# Patient Record
Sex: FEMALE | Race: OTHER | HISPANIC OR LATINO | Employment: FULL TIME | URBAN - METROPOLITAN AREA
[De-identification: names, ages, dates, MRNs, and addresses within clinical notes are randomized per-mention and may not be internally consistent; named-entity substitution may affect disease eponyms.]

---

## 2017-08-29 ENCOUNTER — ALLSCRIPTS OFFICE VISIT (OUTPATIENT)
Dept: OTHER | Facility: OTHER | Age: 52
End: 2017-08-29

## 2017-08-29 DIAGNOSIS — Z11.3 ENCOUNTER FOR SCREENING FOR INFECTIONS WITH PREDOMINANTLY SEXUAL MODE OF TRANSMISSION: ICD-10-CM

## 2017-08-29 DIAGNOSIS — Z83.49 FAMILY HISTORY OF OTHER ENDOCRINE, NUTRITIONAL AND METABOLIC DISEASES: ICD-10-CM

## 2017-08-29 DIAGNOSIS — Z00.00 ENCOUNTER FOR GENERAL ADULT MEDICAL EXAMINATION WITHOUT ABNORMAL FINDINGS: ICD-10-CM

## 2017-08-29 DIAGNOSIS — Z12.31 ENCOUNTER FOR SCREENING MAMMOGRAM FOR MALIGNANT NEOPLASM OF BREAST: ICD-10-CM

## 2017-08-29 DIAGNOSIS — Z13.6 ENCOUNTER FOR SCREENING FOR CARDIOVASCULAR DISORDERS: ICD-10-CM

## 2017-08-31 ENCOUNTER — TRANSCRIBE ORDERS (OUTPATIENT)
Dept: ADMINISTRATIVE | Facility: HOSPITAL | Age: 52
End: 2017-08-31

## 2017-08-31 ENCOUNTER — HOSPITAL ENCOUNTER (OUTPATIENT)
Dept: MAMMOGRAPHY | Facility: HOSPITAL | Age: 52
Discharge: HOME | End: 2017-08-31
Attending: OBSTETRICS & GYNECOLOGY
Payer: COMMERCIAL

## 2017-08-31 ENCOUNTER — APPOINTMENT (OUTPATIENT)
Dept: LAB | Facility: HOSPITAL | Age: 52
End: 2017-08-31
Attending: OBSTETRICS & GYNECOLOGY
Payer: COMMERCIAL

## 2017-08-31 DIAGNOSIS — Z12.31 VISIT FOR SCREENING MAMMOGRAM: Primary | ICD-10-CM

## 2017-08-31 DIAGNOSIS — N92.0 EXCESSIVE OR FREQUENT MENSTRUATION: Primary | ICD-10-CM

## 2017-08-31 DIAGNOSIS — N92.0 EXCESSIVE OR FREQUENT MENSTRUATION: ICD-10-CM

## 2017-08-31 LAB
ERYTHROCYTE [DISTWIDTH] IN BLOOD BY AUTOMATED COUNT: 19.4 % (ref 11.6–15.1)
FERRITIN SERPL-MCNC: 2 NG/ML (ref 8–388)
HCT VFR BLD AUTO: 31.7 % (ref 34.8–46.1)
HGB BLD-MCNC: 8.8 G/DL (ref 11.5–15.4)
MCH RBC QN AUTO: 18.5 PG (ref 26.8–34.3)
MCHC RBC AUTO-ENTMCNC: 27.8 G/DL (ref 31.4–37.4)
MCV RBC AUTO: 67 FL (ref 82–98)
PLATELET # BLD AUTO: 589 THOUSANDS/UL (ref 149–390)
PMV BLD AUTO: 10.4 FL (ref 8.9–12.7)
RBC # BLD AUTO: 4.75 MILLION/UL (ref 3.81–5.12)
T4 SERPL-MCNC: 8.3 UG/DL (ref 4.7–13.3)
TSH SERPL DL<=0.05 MIU/L-ACNC: 1.64 UIU/ML (ref 0.36–3.74)
WBC # BLD AUTO: 11.06 THOUSAND/UL (ref 4.31–10.16)

## 2017-08-31 PROCEDURE — 84439 ASSAY OF FREE THYROXINE: CPT

## 2017-08-31 PROCEDURE — 84436 ASSAY OF TOTAL THYROXINE: CPT

## 2017-08-31 PROCEDURE — 36415 COLL VENOUS BLD VENIPUNCTURE: CPT

## 2017-08-31 PROCEDURE — 84443 ASSAY THYROID STIM HORMONE: CPT

## 2017-08-31 PROCEDURE — 85027 COMPLETE CBC AUTOMATED: CPT

## 2017-08-31 PROCEDURE — 82728 ASSAY OF FERRITIN: CPT

## 2017-09-06 ENCOUNTER — ALLSCRIPTS OFFICE VISIT (OUTPATIENT)
Dept: OTHER | Facility: OTHER | Age: 52
End: 2017-09-06

## 2017-09-06 ENCOUNTER — TRANSCRIBE ORDERS (OUTPATIENT)
Dept: LAB | Facility: CLINIC | Age: 52
End: 2017-09-06

## 2017-09-06 ENCOUNTER — GENERIC CONVERSION - ENCOUNTER (OUTPATIENT)
Dept: OTHER | Facility: OTHER | Age: 52
End: 2017-09-06

## 2017-09-06 ENCOUNTER — APPOINTMENT (OUTPATIENT)
Dept: LAB | Facility: CLINIC | Age: 52
End: 2017-09-06
Payer: COMMERCIAL

## 2017-09-06 DIAGNOSIS — Z00.00 ENCOUNTER FOR GENERAL ADULT MEDICAL EXAMINATION WITHOUT ABNORMAL FINDINGS: ICD-10-CM

## 2017-09-06 DIAGNOSIS — Z13.6 ENCOUNTER FOR SCREENING FOR CARDIOVASCULAR DISORDERS: ICD-10-CM

## 2017-09-06 DIAGNOSIS — Z11.3 ENCOUNTER FOR SCREENING FOR INFECTIONS WITH PREDOMINANTLY SEXUAL MODE OF TRANSMISSION: ICD-10-CM

## 2017-09-06 DIAGNOSIS — Z83.49 FAMILY HISTORY OF OTHER ENDOCRINE, NUTRITIONAL AND METABOLIC DISEASES: ICD-10-CM

## 2017-09-06 LAB
ANION GAP SERPL CALCULATED.3IONS-SCNC: 6 MMOL/L (ref 4–13)
BUN SERPL-MCNC: 8 MG/DL (ref 5–25)
CALCIUM SERPL-MCNC: 9.8 MG/DL (ref 8.3–10.1)
CHLAMYDIA DNA CVX QL NAA+PROBE: NORMAL
CHLORIDE SERPL-SCNC: 106 MMOL/L (ref 100–108)
CHOLEST SERPL-MCNC: 184 MG/DL (ref 50–200)
CO2 SERPL-SCNC: 26 MMOL/L (ref 21–32)
CREAT SERPL-MCNC: 0.75 MG/DL (ref 0.6–1.3)
FERRITIN SERPL-MCNC: 2 NG/ML (ref 8–388)
GFR SERPL CREATININE-BSD FRML MDRD: 93 ML/MIN/1.73SQ M
GLUCOSE P FAST SERPL-MCNC: 84 MG/DL (ref 65–99)
HDLC SERPL-MCNC: 53 MG/DL (ref 40–60)
IRON SATN MFR SERPL: 3 %
IRON SERPL-MCNC: 15 UG/DL (ref 50–170)
LDLC SERPL CALC-MCNC: 98 MG/DL (ref 0–100)
N GONORRHOEA DNA GENITAL QL NAA+PROBE: NORMAL
POTASSIUM SERPL-SCNC: 3.9 MMOL/L (ref 3.5–5.3)
SODIUM SERPL-SCNC: 138 MMOL/L (ref 136–145)
TIBC SERPL-MCNC: 468 UG/DL (ref 250–450)
TRIGL SERPL-MCNC: 167 MG/DL

## 2017-09-06 PROCEDURE — 80048 BASIC METABOLIC PNL TOTAL CA: CPT

## 2017-09-06 PROCEDURE — 87591 N.GONORRHOEAE DNA AMP PROB: CPT

## 2017-09-06 PROCEDURE — 36415 COLL VENOUS BLD VENIPUNCTURE: CPT

## 2017-09-06 PROCEDURE — 83540 ASSAY OF IRON: CPT

## 2017-09-06 PROCEDURE — 87389 HIV-1 AG W/HIV-1&-2 AB AG IA: CPT

## 2017-09-06 PROCEDURE — 86592 SYPHILIS TEST NON-TREP QUAL: CPT

## 2017-09-06 PROCEDURE — 82728 ASSAY OF FERRITIN: CPT

## 2017-09-06 PROCEDURE — 87491 CHLMYD TRACH DNA AMP PROBE: CPT

## 2017-09-06 PROCEDURE — 80061 LIPID PANEL: CPT

## 2017-09-06 PROCEDURE — 83550 IRON BINDING TEST: CPT

## 2017-09-07 LAB — RPR SER QL: NORMAL

## 2017-09-08 LAB — HIV 1+2 AB+HIV1 P24 AG SERPL QL IA: NORMAL

## 2017-09-15 LAB — MISCELLANEOUS LAB TEST RESULT: NORMAL

## 2017-09-21 ENCOUNTER — GENERIC CONVERSION - ENCOUNTER (OUTPATIENT)
Dept: OTHER | Facility: OTHER | Age: 52
End: 2017-09-21

## 2017-09-26 ENCOUNTER — GENERIC CONVERSION - ENCOUNTER (OUTPATIENT)
Dept: OTHER | Facility: OTHER | Age: 52
End: 2017-09-26

## 2017-09-26 ENCOUNTER — HOSPITAL ENCOUNTER (OUTPATIENT)
Dept: MAMMOGRAPHY | Facility: MEDICAL CENTER | Age: 52
Discharge: HOME | End: 2017-09-26
Payer: COMMERCIAL

## 2017-09-26 DIAGNOSIS — Z12.31 VISIT FOR SCREENING MAMMOGRAM: ICD-10-CM

## 2017-09-26 PROCEDURE — 77063 BREAST TOMOSYNTHESIS BI: CPT

## 2017-09-26 PROCEDURE — G0202 SCR MAMMO BI INCL CAD: HCPCS

## 2017-10-27 ENCOUNTER — ALLSCRIPTS OFFICE VISIT (OUTPATIENT)
Dept: OTHER | Facility: OTHER | Age: 52
End: 2017-10-27

## 2017-10-27 ENCOUNTER — TRANSCRIBE ORDERS (OUTPATIENT)
Dept: ADMINISTRATIVE | Facility: HOSPITAL | Age: 52
End: 2017-10-27

## 2017-10-27 ENCOUNTER — APPOINTMENT (OUTPATIENT)
Dept: LAB | Facility: MEDICAL CENTER | Age: 52
End: 2017-10-27
Attending: INTERNAL MEDICINE
Payer: COMMERCIAL

## 2017-10-27 ENCOUNTER — GENERIC CONVERSION - ENCOUNTER (OUTPATIENT)
Dept: OTHER | Facility: OTHER | Age: 52
End: 2017-10-27

## 2017-10-27 DIAGNOSIS — D50.0 IRON DEFICIENCY ANEMIA DUE TO CHRONIC BLOOD LOSS: ICD-10-CM

## 2017-10-27 LAB
ERYTHROCYTE [DISTWIDTH] IN BLOOD BY AUTOMATED COUNT: 25.6 % (ref 11.6–15.1)
HCT VFR BLD AUTO: 40.8 % (ref 34.8–46.1)
HGB BLD-MCNC: 12.6 G/DL (ref 11.5–15.4)
MCH RBC QN AUTO: 24.5 PG (ref 26.8–34.3)
MCHC RBC AUTO-ENTMCNC: 30.9 G/DL (ref 31.4–37.4)
MCV RBC AUTO: 79 FL (ref 82–98)
PLATELET # BLD AUTO: 420 THOUSANDS/UL (ref 149–390)
PMV BLD AUTO: 10.8 FL (ref 8.9–12.7)
RBC # BLD AUTO: 5.15 MILLION/UL (ref 3.81–5.12)
WBC # BLD AUTO: 12.45 THOUSAND/UL (ref 4.31–10.16)

## 2017-10-27 PROCEDURE — 36415 COLL VENOUS BLD VENIPUNCTURE: CPT

## 2017-10-27 PROCEDURE — 83516 IMMUNOASSAY NONANTIBODY: CPT

## 2017-10-27 PROCEDURE — 82784 ASSAY IGA/IGD/IGG/IGM EACH: CPT

## 2017-10-27 PROCEDURE — 86255 FLUORESCENT ANTIBODY SCREEN: CPT

## 2017-10-27 PROCEDURE — 85027 COMPLETE CBC AUTOMATED: CPT

## 2017-10-28 NOTE — CONSULTS
Assessment  1  Iron deficiency anemia due to chronic blood loss (280 0) (D50 0)    Plan  Iron deficiency anemia due to chronic blood loss    · Suprep Bowel Prep Kit 17 5-3 13-1 6 GM/180ML Oral Solution; DILUTE CONTENTS  AND USE AS DIRECTED FOR BOWEL PREP   Rx By: Norman Huston; Dispense: 0 Days ; #:1 X 177 ML Bottle (2 Bottles); Refill: 0;For: Iron deficiency anemia due to chronic blood loss; PARKER = N; Verified Transmission to [facility]ACY #[id number]; Last Updated By: System, SureScrimekhi; 10/27/2017 9:57:05 AM   · (1) CBC/ PLT (NO DIFF); Status:Active; Requested QEM:59LJW7425;    Perform:[facility]; BEQ:[id number]; Ordered; For:Iron deficiency anemia due to chronic blood loss; Ordered By:Holger Victoria;   · (1) CELIAC DISEASE AB PROFILE; Status:Active; Requested QLM:17PCW4743;    Perform:[facility]; [id number]; Ordered; For:Iron deficiency anemia due to chronic blood loss; Ordered By:Holger Victoria;   · COLONOSCOPY (GI, SURG); Status:Hold For - Scheduling; Requested YAF:58BPK9469;    Perform:[facility]; Order Comments:Anish; GDI:45XSB5912; Ordered; For:Iron deficiency anemia due to chronic blood loss; Ordered By:Holger Victoria;   · EGD; Status:Hold For - Scheduling; Requested IWX:46EXA1625;    Perform:[facility]; KDJ:[id number];FRPKAPQ; For:Iron deficiency anemia due to chronic blood loss; Ordered By:Holger Victoria;   · Follow-up PRN Evaluation and Treatment  Follow-up  Status: Complete  Done:  49EBY1480   Ordered; For: Iron deficiency anemia due to chronic blood loss; Ordered By: Norman Huston Performed:  Due: 79HMR6901    Discussion/Summary  Discussion Summary:   She has iron deficiency anemia likely secondary to menorrhagia rule out GI blood loss  I will schedule her for EGD and colonoscopy to assess for peptic ulcer disease, AVMs, advanced adenoma and malignancy  Continue ferrous sulfate 2 tablets a day  Take Colace while taking iron   I will also check celiac panel to rule out celiac disease  reviewed risks benefits and alternates of EGD and colonoscopy  Risks include but not limited to infection, bleeding, perforation, missed lesion  Bowel prep instructions for colonoscopy given  Continue follow up with the family doctor and gynecologist    Counseling Documentation With Imm: The patient was counseled regarding prognosis,-- patient and family education,-- impressions  Goals and Barriers: The patient has the current Goals: Get EGD and colonoscopy  The patent has the current Barriers: Non  Patient's Capacity to Self-Care: Patient is able to Self-Care  Chief Complaint  Chief Complaint Free Text Note Form: consult for colon screening      History of Present Illness  HPI: 81-year-old female here for evaluation of iron deficiency anemia  She has chronic iron deficiency anemia for about 4 years  She denies abdominal pain, melena, hematemesis, or hematochezia  She has heavy menstrual bleeding  She had prior blood transfusion few years ago for low hemoglobin  Her most recent hemoglobin in August 2017 is 8 8 with MCV of 67  Her ferritin is 2, iron 15 and TIBC 468   she never had EGD or colonoscopy in the past   She was started on iron sulfate 2 tablets a day about a month ago  Review of Systems  Complete-Female GI Adult:   Constitutional: No fever, no chills, feels well, no tiredness, no recent weight gain or weight loss  Eyes: No complaints of eye pain, no red eyes, no eyesight problems, no discharge, no dry eyes, no itching of eyes  ENT: no complaints of earache, no loss of hearing, no nose bleeds, no nasal discharge, no sore throat, no hoarseness  Cardiovascular: No complaints of slow heart rate, no fast heart rate, no chest pain, no palpitations, no leg claudication, no lower extremity edema  Respiratory: No complaints of shortness of breath, no wheezing, no cough, no SOB on exertion, no orthopnea, no PND     Gastrointestinal: No complaints of abdominal pain, no constipation, no nausea or vomiting, no diarrhea, no bloody stools-- and-- as noted in HPI  Genitourinary: No complaints of dysuria, no incontinence, no pelvic pain, no dysmenorrhea, no vaginal discharge or bleeding  Musculoskeletal: No complaints of arthralgias, no myalgias, no joint swelling or stiffness, no limb pain or swelling  Integumentary: No complaints of skin rash or lesions, no itching, no skin wounds, no breast pain or lump  Neurological: No complaints of headache, no confusion, no convulsions, no numbness, no dizziness or fainting, no tingling, no limb weakness, no difficulty walking  Psychiatric: Not suicidal, no sleep disturbance, no anxiety or depression, no change in personality, no emotional problems  Endocrine: No complaints of proptosis, no hot flashes, no muscle weakness, no deepening of the voice, no feelings of weakness  Hematologic/Lymphatic: No complaints of swollen glands, no swollen glands in the neck, does not bleed easily, does not bruise easily  ROS Reviewed:   ROS reviewed  Active Problems  1  Blurry vision (368 8) (H53 8)   2  Colon cancer screening (V76 51) (Z12 11)   3  Encounter for gynecological examination without abnormal finding (V72 31) (Z01 419)   4  Flu vaccine need (V04 81) (Z23)   5  Inadequate exercise (V69 0) (Z72 3)   6  Intraductal hyperplasia without atypia of right breast (610 8) (N60 91)   7  Iron deficiency anemia due to chronic blood loss (280 0) (D50 0)   8  Menorrhagia with regular cycle (626 2) (N92 0)   9  Routine screening for STI (sexually transmitted infection) (V74 5) (Z11 3)   10  Screening for heart disease (V81 2) (Z13 6)   11  Visit for screening mammogram (V76 12) (Z12 31)    Past Medical History  1  Denied: History of abnormal cervical Pap smear   2  History of anemia (V12 3) (Z86 2)   3  Denied: History of herpes simplex infection   4  History of pregnancy (V13 29)   5  Denied: History of sexually transmitted disease   6   History of Varicella without complication (084 9) (E45 7)  Active Problems And Past Medical History Reviewed: The active problems and past medical history were reviewed and updated today  Surgical History  1  History of Biopsy Breast Percutaneous Needle Core   2  History of Tubal Ligation  Surgical History Reviewed: The surgical history was reviewed and updated today  Family History  Mother    1  Family history of anemia (V18 2) (Z83 2)   2  Family history of breast cancer (V16 3) (Z80 3)   3  Family history of thyroid disorder (V18 19) (Z83 49)  Father    4  Family history of hyperlipidemia (V18 19) (Z83 49)  Family History    5  Denied: Family history of depression   6  Denied: Family history of DVT   7  Denied: Family history of hypercholesterolemia   8  Denied: Family history of hypertension   9  Denied: Family history of ischemic heart disease   10  Family history of stroke (V17 1) (Z82 3)   11  Denied: Family history of type 2 diabetes mellitus  Family History Reviewed: The family history was reviewed and updated today  Social History   · Alcohol use (V49 89) (Z78 9)   · Denied: History of Drug use   · Inadequate exercise (V69 0) (Z72 3)   ·    · Never smoker   · Occupation   · [address] History Reviewed: The social history was reviewed and updated today  The social history was reviewed and is unchanged  Current Meds   1  Docusate Sodium 100 MG Oral Capsule; Take 1-2 capsules up to 2 times daily with   plenty of water for constipation; Therapy: 42IIU7532 to (Last Rx:76Qur1275)  Requested for: 99Yyw5841 Ordered   2  Ferrous Sulfate 325 (65 Fe) MG Oral Tablet; TAKE 1 TABLET TWICE DAILY WITH MEALS; Therapy: 57WFC3277 to (Renate Angela)  Requested for: 74LMS0061; Last   Rx:62Jze7609 Ordered   3  Ibuprofen 200 MG Oral Tablet; Therapy: (Ulus Lie) to Recorded   4  Tylenol Extra Strength TABS; Therapy: (94 31 11) to Recorded  Medication List Reviewed:    The medication list was reviewed and updated today  Allergies  1  No Known Drug Allergies    Vitals  Vital Signs    Recorded: 73BPA6270 09:35AM   Temperature 98 4 F, Oral   Heart Rate 80   Systolic 735, LUE, Sitting   Diastolic 60, LUE, Sitting   Height 5 ft 2 in   Weight 139 lb    BMI Calculated 25 42   BSA Calculated 1 64   O2 Saturation 98     Physical Exam    Constitutional   General appearance: No acute distress, well appearing and well nourished  Ears, Nose, Mouth, and Throat   Oropharynx: Normal with no erythema, edema, exudate or lesions  Pulmonary   Respiratory effort: No increased work of breathing or signs of respiratory distress  Auscultation of lungs: Clear to auscultation  Cardiovascular   Auscultation of heart: Normal rate and rhythm, normal S1 and S2, without murmurs  Abdomen   Abdomen: Non-tender, no masses  Liver and spleen: No hepatomegaly or splenomegaly  Lymphatic   Palpation of lymph nodes in neck: No lymphadenopathy  Musculoskeletal   Digits and nails: Normal without clubbing or cyanosis  Skin   Skin and subcutaneous tissue: Normal without rashes or lesions  Neurologic   Reflexes: 2+ and symmetric      Psychiatric   Orientation to person, place, and time: Normal          Future Appointments    Date/Time Provider Specialty Site   09/10/2018 11:00 AM Navneet John MD Family [facility]   Electronically signed by : Francisca Morales MD; Oct 27 2017 10:03AM EST                       (Author)

## 2017-10-29 ENCOUNTER — GENERIC CONVERSION - ENCOUNTER (OUTPATIENT)
Dept: OTHER | Facility: OTHER | Age: 52
End: 2017-10-29

## 2017-10-29 LAB
ENDOMYSIUM IGA SER QL: NEGATIVE
GLIADIN PEPTIDE IGA SER-ACNC: 9 UNITS (ref 0–19)
GLIADIN PEPTIDE IGG SER-ACNC: 3 UNITS (ref 0–19)
IGA SERPL-MCNC: 333 MG/DL (ref 87–352)
TTG IGA SER-ACNC: <2 U/ML (ref 0–3)
TTG IGG SER-ACNC: <2 U/ML (ref 0–5)

## 2018-01-10 NOTE — RESULT NOTES
Verified Results  *US BREAST RIGHT LIMITED (DIAGNOSTIC) [id number] 08:30AM Aquilla Burkitt Order Number: [id number]    - Patient Instructions: To schedule this appointment, please contact Central Scheduling at [phone]  Test Name Result Flag Reference   US BREAST RIGHT LIMITED (Report)     Patient History:   Family history of breast cancer in mother at age 48 or over  Benign US guided breast biopsy right of the right breast,    February 19, 2016  Mammo Pathology Letter, February 19, 2016  Patient has never smoked  Patient's BMI is 28 5  Reason for exam: additional evaluation requested from prior    study  Six-month follow-up of numerous hypoechoic lesions in the right    breast      US Breast Right Limited: August 8, 2016 - Check In #: [de-identified]   Technologist: Alexis Smallwood RDMS   Prior study comparison: February 8, 2016, US breast right limited   performed at [address] [place] [address]  Targeted ultrasound demonstrates no evidence of new or suspicious   hypoechoic mass or architectural distortion to suggest    malignancy  None of the previously described findings have    significantly changed in size or morphology  No suspicious hypoechoic mass or architectural    distortion to suggest malignancy  ASSESSMENT: BiRad:2 - Benign     Recommendation:   Routine screening mammogram of both breasts in 6 months       Transcription Location: [facility] 98: [id number]     Risk Value(s):   Tyrer-Cuzick 10 Year: 6 933%, Tyrer-Cuzick Lifetime: 27 408%,    Myriad Table: 1 5%, SLAVA 5 Year: 1 5%, NCI Lifetime: 13 4%   Signed by:   Bc Mendoza MD   8/8/16       Plan  Encounter for screening mammogram for malignant neoplasm of breast    · * MAMMO SCREENING BILATERAL W CAD; Status:Hold For - Scheduling; Requested  for:38Eel3241;

## 2018-01-10 NOTE — PROGRESS NOTES
Assessment   1  Iron deficiency anemia due to chronic blood loss (280 0) (D50 0)   · 2015: transfused 1 U PRBC, iron tabs  9/17: ferritin 2, H/H 8 8/13 7%, MCV 67   2  Encounter for preventive health examination (V70 0) (Z00 00)  3  Screening for heart disease (V81 2) (Z13 6)  4  Routine screening for STI (sexually transmitted infection) (V74 5) (Z11 3)  5  Blurry vision (368 8) (H53 8)    Plan  Blurry vision    · Camila Bennett  (Ophthalmology) Co-Management  *  Status: Hold For - Scheduling   Requested for: 40CBP7450  () Care Summary provided  : Yes  Colon cancer screening    · *1 -  [facility] Co-Management  *  Status: Active   Requested for: 45LJL7824  () Care Summary provided  : Yes   · COLONOSCOPY; Status:Active; Requested DQE:88BGE5674;   FamHx: Family history of thyroid disorder    · (1) BASIC METABOLIC PROFILE; Status:Active; Requested FRZ:40MRU9166;    · (1) IRON PANEL; Status:Active; Requested HOR:25BWD4729; Health Maintenance, Screening for heart disease    · (1) LIPID PANEL, FASTING; Status:Active; Requested NVC:25LUK9416;   Iron deficiency anemia due to chronic blood loss    · Start: Docusate Sodium 100 MG Oral Capsule; Take 1-2 capsules up to 2 times daily with  plenty of water for constipation   · Start: Ferrous Sulfate 325 (65 Fe) MG Oral Tablet; TAKE 1 TABLET TWICE DAILY WITH  MEALS  Routine screening for STI (sexually transmitted infection)    · (1) CHLAMYDIA/GC AMPLIFIED DNA, PCR; Source:Urine, Unspecified Source;  Status:Active; Requested OQP:15XZB9267;    · (1) HIV AG/AB COMBO, 4TH GEN; [Do Not Release]; Status:Active; Requested  LRC:65TSA9644;    · (1) RPR; Status:Active; Requested OSC:38HJW0981;     Discussion/Summary  health maintenance visit healthy adult female Currently, she eats a healthy diet and has an inadequate exercise regimen   the risks and benefits of cervical cancer screening were discussed cervical cancer screening is current Testing was done today for chlamydia, gonorrhea, HIV and and RPR  Breast cancer screening: the risks and benefits of breast cancer screening were discussed and mammogram is current  Colorectal cancer screening: the risks and benefits of colorectal cancer screening were discussed and colorectal cancer screening is current  Osteoporosis screening: the risks and benefits of osteoporosis screening were discussed and bone mineral density testing is not indicated  Screening lab work includes glucose and lipid profile  The risks and benefits of immunizations were discussed and immunizations will be given as outlined in the orders  She was advised to be evaluated by an ophthalmologist and a dentist  Advice and education were given regarding nutrition, aerobic exercise, weight bearing exercise, weight loss, reproductive health, cardiovascular risk reduction and seat belt use  Patient discussion: discussed with the patient  Hepatitis C Screening: the patient was counseled on Hepatitis C screening  (patient born in 1965)   The patient declines Hepatitis C screening       Well-appearing and healthy 50-year-old female here today to establish care + health maintenance as noted, I have ordered colonoscopy and flu shot today  She has a family history of hyperlipidemia and has no recent record of lipid panel being checked, we will check this along with BMP to screen for kidney function and diabetes  She has recently had a CBC that shows significant microcytosis without anemia with a hemoglobin of 8 8, although another provider is managing this I think it is reasonable to check iron panel at this time, and conservatively restart the patient on iron therapy twice a day with Colace in the event that she develops constipation  I've encouraged her to continue to follow with gynecology  I recommended that she see a dentist and an eye doctor annually  I recommended that she start an exercise program, including both cardiovascular and weight bearing exercises  She should follow-up at least annually  Chief Complaint  new pt physical      History of Present Illness  HM, Adult Female: The patient is being seen for a health maintenance evaluation  Social History: Household members include spouse  She is   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  The patient has never smoked cigarettes  She reports never drinking alcohol  She has never used illicit drugs  General Health: The patient's health since the last visit is described as good  She does not have regular dental visits  (plans to schedule a visit)   She denies vision problems  (sometimes difficulty with near vision, plans to make an appointment with eye doctor)   She denies hearing loss  Immunizations status: not up to date   due for flu shot today  Lifestyle:  She consumes a diverse and healthy diet  She has weight concerns  (worried she is too fat for her height)   She does not exercise regularly  (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)   She does not use tobacco  She denies alcohol use  She denies drug use  Reproductive health: the patient is premenopausal   she reports abnormal menses  (very heavy bleeding)   she uses no contraception  (had a tubal)   she is sexually active  pregnancy history: Theron Dover ()  Screening: Cervical cancer screening includes a pap smear performed last year  Breast cancer screening includes a mammogram performed last year  Colorectal cancer screening includes no previous colonoscopy  Metabolic screening includes no previous lipid profile, no previous glucose screening and thyroid function test performed last year  Cardiovascular risk factors: no hypertension, no diabetes, no stress, no obesity, no tobacco use, no illicit drug use and no sedentary lifestyle  Safety elements used: seat belt, safe driving habits, sunscreen, smoke detector, carbon monoxide detector and CPR training for the patient     HPI: this is an otherwise healthy 60-year-old female who presents today to establish care with a new provider  She has a history of anemia due to blood loss  She has been told in the past that she has uterine fibroids which causes heavy bleeding  In the past she did require one time a transfusion, when she was living in [place], and she has also had to take iron in the past although she takes no medications now  She finds herself to be in good health, she works as a home health aide  She remarried 2 years ago, she has 4 children, the youngest is 25, and she has 8 grandchildren  She is also here today to make sure that she is up-to-date on all of her appropriate screening tests, she wants to take care of and maintain her house  Her review of systems is grossly negative with the exception of trouble with near vision  She is not recently been to see an eye doctor or dentist      Review of Systems    Constitutional: no fever and no chills  Eyes: eyesight problems, but as noted in HPI    ENT: no hearing loss  Cardiovascular: no chest pain, no palpitations and no lower extremity edema  Respiratory: no shortness of breath and no wheezing    The patient presents with complaints of cough (last night, suspects it is from allergies)  Gastrointestinal: no abdominal pain, no nausea, no vomiting, no constipation, no diarrhea and no blood in stools  Genitourinary: unexplained vaginal bleeding (very heavy menstrual cycles), but as noted in HPI  Musculoskeletal: no arthralgias and no myalgias  Integumentary: no rashes and no skin lesions  Neurological: no headache, no numbness, no confusion, no dizziness, no convulsions, no fainting and no difficulty walking  Psychiatric: no anxiety and no depression  Endocrine: hot flashes (patient thinks she is close to menopause)  Hematologic/Lymphatic: no swollen glands, no tendency for easy bleeding and no tendency for easy bruising  Active Problems   1   Colon cancer screening (V76 51) (Z12 11)  2  Encounter for gynecological examination without abnormal finding (V72 31) (Z01 419)  3  Inadequate exercise (V69 0) (Z72 3)  4  Intraductal hyperplasia without atypia of right breast (610 8) (N60 91)  5  Iron deficiency anemia due to chronic blood loss (280 0) (D50 0)  6  Menorrhagia with regular cycle (626 2) (N92 0)  7  Visit for screening mammogram (V76 12) (Z12 31)    Past Medical History    · Denied: History of abnormal cervical Pap smear   · Denied: History of herpes simplex infection   · History of pregnancy (V13 29)   · Denied: History of sexually transmitted disease   · History of Varicella without complication (696 4) (S08 4)    Surgical History    · History of Biopsy Breast Percutaneous Needle Core   · History of Tubal Ligation    Family History  Mother    · Family history of breast cancer (V16 3) (Z80 3)   · Family history of thyroid disorder (V18 19) (Z80 46)  Father    · Family history of hyperlipidemia (V18 19) (Z83 49)  Family History    · Denied: Family history of depression   · Denied: Family history of DVT   · Denied: Family history of hypercholesterolemia   · Denied: Family history of hypertension   · Denied: Family history of ischemic heart disease   · Family history of stroke (V17 1) (Z82 3)   · Denied: Family history of type 2 diabetes mellitus    Social History    · Alcohol use (V49 89) (Z78 9)   · rare   · Denied: History of Drug use   · Inadequate exercise (V69 0) (Z72 3)   ·    · Never smoker   · 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   · Worship    Current Meds  1  Ibuprofen 200 MG Oral Tablet; Therapy: (Recorded:05Xeu7234) to Recorded    Allergies   1   No Known Drug Allergies    Vitals   Recorded: 79CRB1516 10:52AM   Heart Rate 687   Systolic 200   Diastolic 70   Height 5 ft 2 in   Weight 140 lb 0 4 oz   BMI Calculated 25 61   BSA Calculated 1 64     Physical Exam    Constitutional   General appearance: No acute distress, well appearing and well nourished  Head and Face   Head and face: Normal     Palpation of the face and sinuses: No sinus tenderness  Eyes   Conjunctiva and lids: No swelling, erythema or discharge  anicteric, no conjunctival injection, however, the conjunctiva are pale  Pupils and irises: Equal, round, reactive to light  extraocular muscles intact, accommodation intact  Ears, Nose, Mouth, and Throat   External inspection of ears and nose: Normal     Otoscopic examination: Tympanic membranes translucent with normal light reflex  Canals patent without erythema  Hearing: Normal     Nasal mucosa, septum, and turbinates: Normal without edema or erythema  Lips, teeth, and gums: Normal, good dentition  Oropharynx: Normal with no erythema, edema, exudate or lesions  moist mucous membranes  Neck   Neck: Supple, symmetric, trachea midline, no masses  Thyroid: Normal, no thyromegaly  nontender, no enlargement  Pulmonary   Respiratory effort: No increased work of breathing or signs of respiratory distress  Auscultation of lungs: Clear to auscultation  no wheezes, rales, rhonchi  Cardiovascular   Auscultation of heart: Normal rate and rhythm, normal S1 and S2, no murmurs  Carotid pulses: 2+ bilaterally  no bruit  Examination of extremities for edema and/or varicosities: Normal     Abdomen   Abdomen: Non-tender, no masses  soft, nontender, nondistended, positive bowel sounds, no CVA tenderness, no suprapubic tenderness  Lymphatic   Palpation of lymph nodes in neck: No lymphadenopathy  Musculoskeletal   Gait and station: Normal     Range of motion: Normal     Stability: Normal     Muscle strength/tone: Normal     Skin   Skin and subcutaneous tissue: Normal without rashes or lesions  warm and dry  Neurologic   Cranial nerves: Cranial nerves II-XII intact  Cortical function: Normal mental status  Sensation: No sensory loss      Psychiatric   Judgment and insight: Normal     Orientation to person, place, and time: Normal     Recent and remote memory: Intact      Mood and affect: Normal        Signatures   Electronically signed by : Denisse Stephens MD; Sep  6 2017 12:00PM EST                       (Author)

## 2018-01-11 NOTE — MISCELLANEOUS
Message  9/5/17 1320: LMOVM to call back  USG with several myomata, some possibly affecting the endometrium and labwork show iron deficiency anemia  rec SIS to further eval uterine cavity, iron supplements  Consider hysteroscopic resection of myomata if cavity affected or option for ablation or Mirena, or Lysteda if not  BEO  9/19/17: pt has scheduled f/u appt to discuss options   BEO      Signatures   Electronically signed by : BIBIANA Castle ; Sep 20 2017 12:43PM EST                       (Author)

## 2018-01-12 NOTE — RESULT NOTES
Verified Results  MAMMO SCREENING BILATERAL W 3D & CAD [id number] 03:12PM Huma Barrow     Test Name Result Flag Reference   MAMMO SCREENING BILATERAL W 3D & CAD (Report)     Patient History:   Family history of breast cancer at age 48 or over in mother  Benign US guided breast biopsy right, February 19, 2016  Mammo    Pathology Letter, February 19, 2016  Patient has never smoked  Patient's BMI is 28 5  Reason for exam: screening, asymptomatic  Mammo Screening Bilateral W DBT and CAD: September 26, 2017 -    Check In #: [de-identified]   2D/3D Procedure   3D views: Bilateral MLO view(s) were taken  2D views: Bilateral CC view(s) were taken  Technologist: MATTEO Molina (MATTEO)(M)   Prior study comparison: February 8, 2016, mammo diagnostic    bilateral W CAD, performed at [address] [place] [address]  February 1, 2016, mammo screening bilateral W CAD, performed at    [address] [place] [address]  The breast tissue is extremely dense, potentially limiting the    sensitivity of mammography  Patient risk, included in this    report, assists in determining the appropriate screening regimen    (such as 3-D mammography or the inclusion of automated breast    ultrasound or MRI)  3-D mammography may also remain indicated as    screening  A combination of mediolateral oblique 3D tomographic    slices as well as standard two-dimensional orthogonal images were   obtained  No dominant soft tissue mass, architectural distortion or    suspicious calcifications are noted in either breast  The skin    and nipple structures are within normal limits  Scattered benign   appearing calcifications are noted  No significant changes when compared with prior studies  ACR BI-RADSï¾® Assessments: BiRad:2 - Benign     Recommendation:   Routine screening mammogram of both breasts in 1 year  A    reminder letter will be scheduled       The patient is scheduled in a reminder system for screening mammography  8-10% of cancers will be missed on mammography  Management of a    palpable abnormality must be based on clinical grounds  Patients    will be notified of their results via letter from our facility  Accredited by [address] [place] [address] of Radiology and FDA       Transcription Location: AMTTEO Iglesias 98: [id number]     Risk Value(s):   Jeromyer-Cutobick 10 Year: 6 900%, Tyrer-Cuzick Lifetime: 26 100%,    Myriad Table: 1 5%, SLAVA 5 Year: 2 0%, NCI Lifetime: 16 5%

## 2018-01-13 VITALS
BODY MASS INDEX: 25.58 KG/M2 | HEIGHT: 62 IN | WEIGHT: 139 LBS | SYSTOLIC BLOOD PRESSURE: 114 MMHG | DIASTOLIC BLOOD PRESSURE: 76 MMHG

## 2018-01-14 VITALS
BODY MASS INDEX: 25.77 KG/M2 | WEIGHT: 140.03 LBS | HEART RATE: 110 BPM | SYSTOLIC BLOOD PRESSURE: 100 MMHG | HEIGHT: 62 IN | DIASTOLIC BLOOD PRESSURE: 70 MMHG

## 2018-01-14 VITALS
OXYGEN SATURATION: 98 % | BODY MASS INDEX: 25.58 KG/M2 | TEMPERATURE: 98.4 F | DIASTOLIC BLOOD PRESSURE: 60 MMHG | HEART RATE: 80 BPM | WEIGHT: 139 LBS | SYSTOLIC BLOOD PRESSURE: 120 MMHG | HEIGHT: 62 IN

## 2018-01-15 NOTE — RESULT NOTES
Verified Results  US BREAST LEFT LIMITED [id number] 08:19AM Mario Clark Order Number: [id number]   Performing Comments: Bilateral areas of asymmetry   - Patient Instructions: To schedule this appointment, please contact Central Scheduling at [phone]  Test Name Result Flag Reference   US BREAST LEFT LIMITED (Report)     Patient History:   Family history of breast cancer in mother at age 48 or over  Patient has never smoked  Patient's BMI is 28 5  Reason for exam: additional evaluation requested from abnormal    screening  Bilateral asymmetric densities identified on baseline screening    mammogram      Mammo Diagnostic Bilateral W CAD: February 8, 2016 - Check In #:    [de-identified]   Bilateral spot compression MLO, spot compression CC, and ML    view(s) were taken  Technologist: MARILEE Zazueta   Prior study comparison: February 1, 2016, mammo screening    bilateral W CAD, performed at [facility]  The breast tissue is heterogeneously dense, potentially limiting    the sensitivity of mammography  Patient risk, included in this    report, assists in determining the appropriate screening regimen    (such as 3-D mammography or the inclusion of automated breast    ultrasound or MRI)  3-D mammography may also remain indicated as    screening  Intermediate density nodule in the lower right breast   demonstrates no definite corresponding abnormality on    craniocaudal mammographic views  Asymmetric density in the lower and inner left breast dissipates    on diagnostic mammographic views most consistent with summation    shadow  Targeted right breast ultrasound reveals a hypoechoic sharply    circumscribed wider than tall solid appearing mass measuring 13    mm at the 7 o'clock position 2 cm from the nipple almost    certainly corresponding to the mammographic density identified in   the lower right breast on mammography   Additionally there are complex cysts measuring 5 mm at the 8 o'clock position 5 cm from    the nipple and 5 mm at the 8 o'clock position 4 cm from the    nipple for which 6 month ultrasound follow-up is recommended  Targeted left breast ultrasound reveals simple and minimally    complex cysts without evidence of suspicious solid mass  The    largest of these measures 10 mm at the 6 o'clock position in the    retroareolar position  No suspicious solid mass is identified to   account for the mammographic density on screening mammography    which is thought to represent summation shadow  Impression:     Hypoechoic nodule in the right breast probably represent    fibroadenoma however, ultrasound-guided core biopsy is    recommended for more definitive characterization of this solid    mass  Findings and recommendations were personally discussed    with the patient at the time of this examination  Additional almost certainly benign subcentimeter complex cysts    identified in the right breast for which 6 month ultrasound    follow-up is recommended  Summation shadow in the left breast      US Breast Right Limited: February 8, 2016 - Check In #: [de-identified]   Technologist: Roberto Burt     US Breast Left Limited: February 8, 2016 - Check In #: [de-identified]   Technologist: Roberto Burt     ASSESSMENT: BiRad:4 - Suspicious (Overall)     Recommendation:   Ultrasound-guided biopsy of the right breast  A breast darian    care nurse from our facility will be following/facilitating this    case  Ultrasound of the right breast in 6 months     Analyzed by CAD     Transcription Location: [address]   Signing Station: [id number]     Risk Value(s):   Tyrer-Cuzick 10 Year: 3 444%, Tyrer-Cuzick Lifetime: 14 466%,    Myriad Table: 1 5%, SLAVA 5 Year: 1 3%, NCI Lifetime: 11 5%       Plan  Breast asymmetry, Encounter for screening mammogram for malignant neoplasm of  breast    · Raffy Pathak; Status:Hold For - Scheduling; Requested for:[id number];

## 2018-01-15 NOTE — RESULT NOTES
Verified Results  (1) BASIC METABOLIC PROFILE [id number] 11:46AM [facility] Order Number: [id number]     Test Name Result Flag Reference   SODIUM 138 mmol/L  136-145   POTASSIUM 3 9 mmol/L  3 5-5 3   CHLORIDE 106 mmol/L  100-108   CARBON DIOXIDE 26 mmol/L  21-32   ANION GAP (CALC) 6 mmol/L  4-13   BLOOD UREA NITROGEN 8 mg/dL  5-25   CREATININE 0 75 mg/dL  0 60-1 30   Standardized to IDMS reference method   CALCIUM 9 8 mg/dL  8 3-10 1   eGFR 93 ml/min/1 73sq m     National Kidney Disease Education Program recommendations are as follows:  GFR calculation is accurate only with a steady state creatinine  Chronic Kidney disease less than 60 ml/min/1 73 sq  meters  Kidney failure less than 15 ml/min/1 73 sq  meters  GLUCOSE FASTING 84 mg/dL  65-99   Specimen collection should occur prior to Sulfasalazine administration due to the potential for falsely depressed results  Specimen collection should occur prior to Sulfapyridine administration due to the potential for falsely elevated results  (1) IRON PANEL [id number] 11:46AM [facility] Order Number: [id number]     Test Name Result Flag Reference   IRON 15 ug/dL L    Patients treated with metal-binding drugs (ie  Deferoxamine) may have depressed iron values  FERRITIN 2 ng/mL L 8-388   TOTAL IRON BINDING CAPACITY 468 ug/dL H 250-450   IRON SATURATION 3 %       (1) LIPID PANEL, FASTING [id number] 11:46AM [facility] Order Number: [id number]     Test Name Result Flag Reference   CHOLESTEROL 184 mg/dL     HDL,DIRECT 53 mg/dL  40-60   Specimen collection should occur prior to Metamizole administration due to the potential for falsley depressed results  LDL CHOLESTEROL CALCULATED 98 mg/dL  0-100   Triglyceride:        Normal ??? ??? ??? ??? ??? ??? ??? <150 mg/dl   ??? ??? ???Borderline High ??? ??? 150-199 mg/dl   ??? ??? ? ?? High ??? ??? ??? ??? ??? ??? ??? 200-499 mg/dl   ??? ??? ? ??Very High ??? ??? ??? ??? ??? >499 mg/dl      Cholesterol:       Desirable ??? ??? ??? ??? <200 mg/dl   ??? ??? Borderline High ??? 200-239 mg/dl   ??? ??? High ??? ??? ??? ??? ??? ??? >239 mg/dl      HDL Cholesterol:       High ??? ???>59 mg/dL   ??? ??? Low ??? ??? <41 mg/dL      This screening LDL is a calculated result  It does not have the accuracy of the Direct Measured LDL in the monitoring of patients with hyperlipidemia and/or statin therapy  Direct Measure LDL (HBG121) must be ordered separately in these patients  TRIGLYCERIDES 167 mg/dL H <=150   Specimen collection should occur prior to N-Acetylcysteine or Metamizole administration due to the potential for falsely depressed results

## 2018-01-15 NOTE — RESULT NOTES
Verified Results  US BREAST RIGHT LIMITED [id number] 08:19AM Lindsey Chao Order Number: [id number]   Performing Comments: bilateral areas of asymmetry   - Patient Instructions: To schedule this appointment, please contact Central Scheduling at [phone]  Test Name Result Flag Reference   US BREAST RIGHT LIMITED (Report)     Patient History:   Family history of breast cancer in mother at age 48 or over  Patient has never smoked  Patient's BMI is 28 5  Reason for exam: additional evaluation requested from abnormal    screening  Bilateral asymmetric densities identified on baseline screening    mammogram      Mammo Diagnostic Bilateral W CAD: February 8, 2016 - Check In #:    [de-identified]   Bilateral spot compression MLO, spot compression CC, and ML    view(s) were taken  Technologist: MARILEE Conley   Prior study comparison: February 1, 2016, mammo screening    bilateral W CAD, performed at [address] [facility]  The breast tissue is heterogeneously dense, potentially limiting    the sensitivity of mammography  Patient risk, included in this    report, assists in determining the appropriate screening regimen    (such as 3-D mammography or the inclusion of automated breast    ultrasound or MRI)  3-D mammography may also remain indicated as    screening  Intermediate density nodule in the lower right breast   demonstrates no definite corresponding abnormality on    craniocaudal mammographic views  Asymmetric density in the lower and inner left breast dissipates    on diagnostic mammographic views most consistent with summation    shadow  Targeted right breast ultrasound reveals a hypoechoic sharply    circumscribed wider than tall solid appearing mass measuring 13    mm at the 7 o'clock position 2 cm from the nipple almost    certainly corresponding to the mammographic density identified in   the lower right breast on mammography   Additionally there are    complex cysts measuring 5 mm at the 8 o'clock position 5 cm from    the nipple and 5 mm at the 8 o'clock position 4 cm from the    nipple for which 6 month ultrasound follow-up is recommended  Targeted left breast ultrasound reveals simple and minimally    complex cysts without evidence of suspicious solid mass  The    largest of these measures 10 mm at the 6 o'clock position in the    retroareolar position  No suspicious solid mass is identified to   account for the mammographic density on screening mammography    which is thought to represent summation shadow  Impression:     Hypoechoic nodule in the right breast probably represent    fibroadenoma however, ultrasound-guided core biopsy is    recommended for more definitive characterization of this solid    mass  Findings and recommendations were personally discussed    with the patient at the time of this examination  Additional almost certainly benign subcentimeter complex cysts    identified in the right breast for which 6 month ultrasound    follow-up is recommended  Summation shadow in the left breast      US Breast Right Limited: February 8, 2016 - Check In #: [de-identified]   Technologist: Leisa Goncalves     US Breast Left Limited: February 8, 2016 - Check In #: [de-identified]   Technologist: Leisa Goncalves     ASSESSMENT: BiRad:4 - Suspicious (Overall)     Recommendation:   Ultrasound-guided biopsy of the right breast  A breast darian    care nurse from our facility will be following/facilitating this    case  Ultrasound of the right breast in 6 months     Analyzed by CAD     Transcription Location: [address]   Signing Station: [id number]     Risk Value(s):   Tyrer-Cuzick 10 Year: 3 444%, Tyrer-Cuzick Lifetime: 14 466%,    Myriad Table: 1 5%, SLAVA 5 Year: 1 3%, NCI Lifetime: 11 5%       Plan  Breast asymmetry, Breast lump    · US BREAST RIGHT LIMITED; Status:Hold For - Scheduling; Requested for:[id number];

## 2018-01-15 NOTE — RESULT NOTES
Verified Results  (1) CBC/ PLT (NO DIFF) 27Oct2017 10:28AM Mal Aguilar    Order Number: [id number]     Test Name Result Flag Reference   HEMATOCRIT 40 8 %  34 8-46 1   HEMOGLOBIN 12 6 g/dL  11 5-15 4   MCHC 30 9 g/dL L 31 4-37 4   MCH 24 5 pg L 26 8-34 3   MCV 79 fL L 82-98   PLATELET COUNT 346 Thousands/uL H 149-390   RBC COUNT 5 15 Million/uL H 3 81-5 12   RDW 25 6 % H 11 6-15 1   WBC COUNT 12 45 Thousand/uL H 4 31-10 16   MPV 10 8 fL  8 9-12 7

## 2018-01-16 NOTE — RESULT NOTES
Verified Results  (1) SPECIAL TEST [id number] 09:06AM Coreen Domenic     Test Name Result Flag Reference   TEST RESULT see written report     T4, FREE DIRECT DIALYSIS

## 2018-01-18 NOTE — RESULT NOTES
Verified Results  (1) CELIAC DISEASE AB PROFILE 27Oct2017 10:28AM Vilma Brewer    Order Number: [id number]     Test Name Result Flag Reference   tTG IGG <2 U/mL  0 - 5   Negative        0 - 5                                Weak Positive   6 - 9                                Positive           >9   tTG IGA <2 U/mL  0 - 3   Negative        0 -  3                                Weak Positive   4 - 10                                Positive           >10   Tissue Transglutaminase (tTG) has been identified   as the endomysial antigen  Studies have demonstr-   ated that endomysial IgA antibodies have over 99%   specificity for gluten sensitive enteropathy     GLIADA 9 units  0 - 19   Negative                   0 - 19                     Weak Positive             20 - 30                     Moderate to Strong Positive   >30   GLIADG 3 units  0 - 19   Negative                   0 - 19                     Weak Positive             20 - 30                     Moderate to Strong Positive   >30   ENDOMYSIAL AB IGA Negative  Negative   Performed at:  [address] [place] [address]  [id number]  : Marty Diez MD, Phone:  [phone]    mg/dL  87 - 032

## 2018-01-22 VITALS
WEIGHT: 139.03 LBS | DIASTOLIC BLOOD PRESSURE: 70 MMHG | SYSTOLIC BLOOD PRESSURE: 100 MMHG | HEIGHT: 62 IN | BODY MASS INDEX: 25.58 KG/M2

## 2018-02-13 ENCOUNTER — OFFICE VISIT (OUTPATIENT)
Dept: OBGYN CLINIC | Facility: CLINIC | Age: 53
End: 2018-02-13
Payer: COMMERCIAL

## 2018-02-13 VITALS
BODY MASS INDEX: 27.56 KG/M2 | HEIGHT: 61 IN | DIASTOLIC BLOOD PRESSURE: 70 MMHG | WEIGHT: 146 LBS | SYSTOLIC BLOOD PRESSURE: 122 MMHG

## 2018-02-13 DIAGNOSIS — D25.1 INTRAMURAL LEIOMYOMA OF UTERUS: ICD-10-CM

## 2018-02-13 DIAGNOSIS — D50.0 IRON DEFICIENCY ANEMIA DUE TO CHRONIC BLOOD LOSS: Primary | ICD-10-CM

## 2018-02-13 DIAGNOSIS — N92.0 MENORRHAGIA WITH REGULAR CYCLE: ICD-10-CM

## 2018-02-13 PROBLEM — D25.9 UTERINE FIBROID: Status: ACTIVE | Noted: 2018-02-13

## 2018-02-13 PROCEDURE — 99214 OFFICE O/P EST MOD 30 MIN: CPT | Performed by: OBSTETRICS & GYNECOLOGY

## 2018-02-13 RX ORDER — MEDROXYPROGESTERONE ACETATE 5 MG/1
5 TABLET ORAL DAILY
Qty: 90 TABLET | Refills: 1 | Status: SHIPPED | OUTPATIENT
Start: 2018-02-13 | End: 2018-03-26

## 2018-02-13 RX ORDER — IBUPROFEN 200 MG
500 TABLET ORAL DAILY
COMMUNITY
End: 2018-08-06 | Stop reason: ALTCHOICE

## 2018-02-13 RX ORDER — FERROUS SULFATE TAB EC 324 MG (65 MG FE EQUIVALENT) 324 (65 FE) MG
324 TABLET DELAYED RESPONSE ORAL
Qty: 100 TABLET | Refills: 2 | Status: SHIPPED | OUTPATIENT
Start: 2018-02-13 | End: 2018-05-31

## 2018-02-13 NOTE — PROGRESS NOTES
Patient is a 47 y o  No obstetric history on file  with Patient's last menstrual period was 2018 (exact date)  who presents complaining of continued heavy menses  Pt seen last in September with options of Mirena, Lysteda,ablation discussed  Pt's finances limited  Rec embx given age and level of bleeding  Pt to consider  Pt aware of risk for CA  Past Medical History:   Diagnosis Date    Intraductal hyperplasia without atypia of right breast 2016    c florid and sclerosing adenosis, background dense stromal fibrosis    Iron deficiency anemia due to chronic blood loss     transfused : ferritin 2   H/H 8  7%    Menorrhagia with regular cycle     Varicella        Past Surgical History:   Procedure Laterality Date    BREAST BIOPSY Right 2016    intraductal hyperplasia    TUBAL LIGATION         OB History    Para Term  AB Living   4 4 4         SAB TAB Ectopic Multiple Live Births                  # Outcome Date GA Lbr Ant/2nd Weight Sex Delivery Anes PTL Lv   4 Term      Vag-Spont      3 Term      Vag-Spont      2 Term      Vag-Spont      1 Term      Vag-Spont             Gyn HX:  denies STD, abnormal pap, significant dysmenorrhea or irregular menses      Current Outpatient Prescriptions:     ferrous sulfate 324 (65 Fe) mg, Take 1 tablet (324 mg total) by mouth 2 (two) times a day before meals, Disp: 100 tablet, Rfl: 2    ibuprofen (MOTRIN) 200 mg tablet, Take 500 mg by mouth daily, Disp: , Rfl:     medroxyPROGESTERone (PROVERA) 5 mg tablet, Take 1 tablet (5 mg total) by mouth daily for 90 days, Disp: 90 tablet, Rfl: 1    norethindrone (AYGESTIN) 5 mg tablet, Take 1 tab orally each day fom -14th each month, Disp: 90 tablet, Rfl: 1    No Known Allergies    Social History     Social History    Marital status: /Civil Union     Spouse name: N/A    Number of children: N/A    Years of education: N/A     Social History Main Topics    Smoking status: Never Smoker    Smokeless tobacco: Never Used    Alcohol use No    Drug use: No    Sexual activity: Yes     Partners: Male     Birth control/ protection: Female Sterilization     Other Topics Concern    None     Social History Narrative    None       Family History   Problem Relation Age of Onset   Kyra Fitzpatrick Breast cancer Mother      52's passed 78 yo c mets    Anemia Mother     Thyroid disease Mother     Hyperlipidemia Father     Deep vein thrombosis Neg Hx     Mental illness Neg Hx     Hypertension Neg Hx     Coronary artery disease Neg Hx     Diabetes type II Neg Hx        Review of Systems   Constitutional: Negative for chills and fever  HENT: Negative for mouth sores, rhinorrhea and sore throat  Respiratory: Negative for shortness of breath and wheezing  Cardiovascular: Negative for chest pain and leg swelling  Gastrointestinal: Negative for abdominal distention, abdominal pain, diarrhea and nausea  Genitourinary: Negative for flank pain, hematuria and vaginal bleeding  Musculoskeletal: Negative for back pain  Neurological: Negative for dizziness, weakness and light-headedness  Hematological: Negative for adenopathy  Does not bruise/bleed easily  Psychiatric/Behavioral: Negative for confusion  Blood pressure 122/70, height 5' 0 71" (1 542 m), weight 66 2 kg (146 lb), last menstrual period 02/05/2018, not currently breastfeeding  and Body mass index is 27 85 kg/m²  Physical Exam   Constitutional: She is oriented to person, place, and time  She appears well-developed and well-nourished  HENT:   Head: Normocephalic and atraumatic  Neck: No tracheal deviation present  No thyromegaly present  Pulmonary/Chest: Effort normal  No respiratory distress  Abdominal: Soft  She exhibits no distension and no mass  There is no tenderness  There is no guarding  No hernia  Musculoskeletal: She exhibits no edema  Neurological: She is alert and oriented to person, place, and time     Skin: Skin is warm and dry  Psychiatric: She has a normal mood and affect  vulva: normal external genitalia for age and no lesions, masses, epithelial changes, or exudate  vagina: color pink, rugae  well formed rugae and discharge  bloody, mucoid and mod quantity with steady trickle through cervix  cervix: parous and no lesions   uterus: AF, 10 wks, mobile and irregular with 2-3 cm ant mid myoa anf 4 cm right fundal myoma and firm  adnexa: no masses or tenderness   Perineum wnl      A/P:  Pt is a 47 y o   c LMP 18 c menorrhagia similar to last visit in September    With unfortunately no coverage for problem care  Mirena is not covered nor are therapeutic pharmacy services  Bleeding appears similar to September's  Will trial daily progestin for suppression in lieu of Mirena, recheck heme labs as none since Oct   Encourage pt to contact business office re that aspect  Pt aware embx highly encouraged to r/o CA given her age  Diagnoses and all orders for this visit:    Iron deficiency anemia due to chronic blood loss  -     CBC; Future  -     Ferritin; Future  -     TSH, 3rd generation with T4 reflex; Future  -     ferrous sulfate 324 (65 Fe) mg; Take 1 tablet (324 mg total) by mouth 2 (two) times a day before meals    Menorrhagia with regular cycle  -     norethindrone (AYGESTIN) 5 mg tablet; Take 1 tab orally each day fom -14th each month  -     medroxyPROGESTERone (PROVERA) 5 mg tablet; Take 1 tablet (5 mg total) by mouth daily for 90 days  -     ferrous sulfate 324 (65 Fe) mg;  Take 1 tablet (324 mg total) by mouth 2 (two) times a day before meals    Intramural leiomyoma of uterus

## 2018-02-14 ENCOUNTER — LAB (OUTPATIENT)
Dept: LAB | Facility: CLINIC | Age: 53
End: 2018-02-14
Payer: COMMERCIAL

## 2018-02-14 ENCOUNTER — TRANSCRIBE ORDERS (OUTPATIENT)
Dept: LAB | Facility: CLINIC | Age: 53
End: 2018-02-14

## 2018-02-14 DIAGNOSIS — D50.0 IRON DEFICIENCY ANEMIA DUE TO CHRONIC BLOOD LOSS: ICD-10-CM

## 2018-02-14 LAB
ERYTHROCYTE [DISTWIDTH] IN BLOOD BY AUTOMATED COUNT: 15.2 % (ref 11.6–15.1)
FERRITIN SERPL-MCNC: 3 NG/ML (ref 8–388)
HCT VFR BLD AUTO: 34 % (ref 34.8–46.1)
HGB BLD-MCNC: 10.4 G/DL (ref 11.5–15.4)
MCH RBC QN AUTO: 24.6 PG (ref 26.8–34.3)
MCHC RBC AUTO-ENTMCNC: 30.6 G/DL (ref 31.4–37.4)
MCV RBC AUTO: 80 FL (ref 82–98)
PLATELET # BLD AUTO: 449 THOUSANDS/UL (ref 149–390)
PMV BLD AUTO: 11 FL (ref 8.9–12.7)
RBC # BLD AUTO: 4.23 MILLION/UL (ref 3.81–5.12)
TSH SERPL DL<=0.05 MIU/L-ACNC: 0.81 UIU/ML (ref 0.36–3.74)
WBC # BLD AUTO: 11.5 THOUSAND/UL (ref 4.31–10.16)

## 2018-02-14 PROCEDURE — 84443 ASSAY THYROID STIM HORMONE: CPT

## 2018-02-14 PROCEDURE — 85027 COMPLETE CBC AUTOMATED: CPT

## 2018-02-14 PROCEDURE — 36415 COLL VENOUS BLD VENIPUNCTURE: CPT

## 2018-02-14 PROCEDURE — 82728 ASSAY OF FERRITIN: CPT

## 2018-02-21 ENCOUNTER — HOSPITAL ENCOUNTER (EMERGENCY)
Facility: HOSPITAL | Age: 53
Discharge: HOME | End: 2018-02-21
Attending: EMERGENCY MEDICINE | Admitting: EMERGENCY MEDICINE
Payer: COMMERCIAL

## 2018-02-21 VITALS
SYSTOLIC BLOOD PRESSURE: 100 MMHG | HEART RATE: 74 BPM | WEIGHT: 140 LBS | OXYGEN SATURATION: 98 % | DIASTOLIC BLOOD PRESSURE: 52 MMHG | TEMPERATURE: 98 F | RESPIRATION RATE: 17 BRPM

## 2018-02-21 DIAGNOSIS — N93.9 ABNORMAL VAGINAL BLEEDING: Primary | ICD-10-CM

## 2018-02-21 LAB
BACTERIA UR QL AUTO: ABNORMAL /HPF
BASOPHILS # BLD AUTO: 0.07 THOUSANDS/ΜL (ref 0–0.1)
BASOPHILS NFR BLD AUTO: 1 % (ref 0–1)
BILIRUB UR QL STRIP: NEGATIVE
CLARITY UR: ABNORMAL
COLOR UR: ABNORMAL
EOSINOPHIL # BLD AUTO: 0.38 THOUSAND/ΜL (ref 0–0.61)
EOSINOPHIL NFR BLD AUTO: 3 % (ref 0–6)
ERYTHROCYTE [DISTWIDTH] IN BLOOD BY AUTOMATED COUNT: 15.2 % (ref 11.6–15.1)
EXT PREG TEST URINE: NEGATIVE
GLUCOSE UR STRIP-MCNC: NEGATIVE MG/DL
HCT VFR BLD AUTO: 30.2 % (ref 34.8–46.1)
HGB BLD-MCNC: 9.3 G/DL (ref 11.5–15.4)
HGB UR QL STRIP.AUTO: ABNORMAL
KETONES UR STRIP-MCNC: NEGATIVE MG/DL
LEUKOCYTE ESTERASE UR QL STRIP: NEGATIVE
LYMPHOCYTES # BLD AUTO: 3.51 THOUSANDS/ΜL (ref 0.6–4.47)
LYMPHOCYTES NFR BLD AUTO: 29 % (ref 14–44)
MCH RBC QN AUTO: 24.5 PG (ref 26.8–34.3)
MCHC RBC AUTO-ENTMCNC: 30.8 G/DL (ref 31.4–37.4)
MCV RBC AUTO: 80 FL (ref 82–98)
MONOCYTES # BLD AUTO: 0.79 THOUSAND/ΜL (ref 0.17–1.22)
MONOCYTES NFR BLD AUTO: 7 % (ref 4–12)
NEUTROPHILS # BLD AUTO: 7.38 THOUSANDS/ΜL (ref 1.85–7.62)
NEUTS SEG NFR BLD AUTO: 60 % (ref 43–75)
NITRITE UR QL STRIP: NEGATIVE
NON-SQ EPI CELLS URNS QL MICRO: ABNORMAL /HPF
NRBC BLD AUTO-RTO: 0 /100 WBCS
PH UR STRIP.AUTO: 6 [PH] (ref 4.5–8)
PLATELET # BLD AUTO: 419 THOUSANDS/UL (ref 149–390)
PMV BLD AUTO: 10.1 FL (ref 8.9–12.7)
PROT UR STRIP-MCNC: ABNORMAL MG/DL
RBC # BLD AUTO: 3.8 MILLION/UL (ref 3.81–5.12)
RBC #/AREA URNS AUTO: ABNORMAL /HPF
SP GR UR STRIP.AUTO: >=1.03 (ref 1–1.03)
UROBILINOGEN UR QL STRIP.AUTO: 0.2 E.U./DL
WBC # BLD AUTO: 12.13 THOUSAND/UL (ref 4.31–10.16)
WBC #/AREA URNS AUTO: ABNORMAL /HPF

## 2018-02-21 PROCEDURE — 81025 URINE PREGNANCY TEST: CPT | Performed by: EMERGENCY MEDICINE

## 2018-02-21 PROCEDURE — 99284 EMERGENCY DEPT VISIT MOD MDM: CPT

## 2018-02-21 PROCEDURE — 81001 URINALYSIS AUTO W/SCOPE: CPT | Performed by: EMERGENCY MEDICINE

## 2018-02-21 PROCEDURE — 85025 COMPLETE CBC W/AUTO DIFF WBC: CPT | Performed by: EMERGENCY MEDICINE

## 2018-02-21 PROCEDURE — 36415 COLL VENOUS BLD VENIPUNCTURE: CPT | Performed by: EMERGENCY MEDICINE

## 2018-02-21 PROCEDURE — 96374 THER/PROPH/DIAG INJ IV PUSH: CPT

## 2018-02-21 RX ORDER — KETOROLAC TROMETHAMINE 30 MG/ML
30 INJECTION, SOLUTION INTRAMUSCULAR; INTRAVENOUS ONCE
Status: COMPLETED | OUTPATIENT
Start: 2018-02-21 | End: 2018-02-21

## 2018-02-21 RX ORDER — IBUPROFEN 600 MG/1
600 TABLET ORAL EVERY 6 HOURS PRN
Qty: 15 TABLET | Refills: 0 | Status: SHIPPED | OUTPATIENT
Start: 2018-02-21 | End: 2018-05-31

## 2018-02-21 RX ORDER — KETOROLAC TROMETHAMINE 30 MG/ML
30 INJECTION, SOLUTION INTRAMUSCULAR; INTRAVENOUS ONCE
Status: DISCONTINUED | OUTPATIENT
Start: 2018-02-21 | End: 2018-02-21

## 2018-02-21 RX ADMIN — KETOROLAC TROMETHAMINE 30 MG: 30 INJECTION, SOLUTION INTRAMUSCULAR at 04:30

## 2018-02-21 NOTE — DISCHARGE INSTRUCTIONS
Dysfunctional Uterine Bleeding   WHAT YOU NEED TO KNOW:   Dysfunctional uterine bleeding (DUB) is abnormal uterine bleeding that is caused by a problem with your hormones  You may have bleeding from your uterus at times other than your normal monthly period  Your monthly periods may last longer or shorter, and bleeding may be heavier or lighter than usual    DISCHARGE INSTRUCTIONS:   Medicines:   · Hormones  help decrease bleeding by making your monthly periods more regular  Sometimes this medicine may be given as birth control pills  · NSAIDs  help decrease swelling, pain, and fever  This medicine is available with or without a doctor's order  NSAIDs can cause stomach bleeding or kidney problems in certain people  If you take blood thinner medicine, always ask your healthcare provider if NSAIDs are safe for you  Always read the medicine label and follow directions  · Iron supplements  may be given if your blood iron level decreases because of heavy bleeding  Iron may make you constipated  Ask your healthcare provider for ways to prevent or treat constipation  Iron may also make your bowel movements turn dark or black  · Take your medicine as directed  Contact your healthcare provider if you think your medicine is not helping or if you have side effects  Tell him or her if you are allergic to any medicine  Keep a list of the medicines, vitamins, and herbs you take  Include the amounts, and when and why you take them  Bring the list or the pill bottles to follow-up visits  Carry your medicine list with you in case of an emergency  Follow up with your healthcare provider as directed:  Write down your questions so you remember to ask them during your visits  Self-care:   · Apply heat  on your lower abdomen for 20 to 30 minutes every 2 hours for as many days as directed  Heat helps decrease pain and muscle spasms  · Include foods high in iron  if needed   Examples of foods high in iron are leafy green vegetables, beef, pork, liver, eggs, and whole-grain breads and cereals  · Keep a diary of your menstrual cycles  Keep track of the number of tampons or pads you use each day  · Talk to your healthcare provider before you start a weight loss program   You may need to wait until the abnormal bleeding has stopped before you try to lose weight  The amount of iron in your blood should be normal before you lose weight  Contact your healthcare provider if:   · You need to change your sanitary pad or tampon more than once an hour  · Your medicine causes nausea, vomiting, or diarrhea  · You have questions or concerns about your condition or care  Return to the emergency department if:   · You continue to bleed heavily, or you feel faint  © 2017 [address]  Information is for End User's use only and may not be sold, redistributed or otherwise used for commercial purposes  All illustrations and images included in CareNotes® are the copyrighted property of A D A M , Inc  or [employer]  The above information is an  only  It is not intended as medical advice for individual conditions or treatments  Talk to your doctor, nurse or pharmacist before following any medical regimen to see if it is safe and effective for you

## 2018-02-26 ENCOUNTER — TELEPHONE (OUTPATIENT)
Dept: OBGYN CLINIC | Facility: CLINIC | Age: 53
End: 2018-02-26

## 2018-02-27 ENCOUNTER — TELEPHONE (OUTPATIENT)
Dept: OBGYN CLINIC | Facility: CLINIC | Age: 53
End: 2018-02-27

## 2018-02-27 NOTE — TELEPHONE ENCOUNTER
Patient was returning your phone call regarding her blood work  Please call her back when you have a chance  Thank you    BS

## 2018-03-23 RX ORDER — MEDROXYPROGESTERONE ACETATE 10 MG/1
TABLET ORAL
Refills: 0 | COMMUNITY
Start: 2018-02-22 | End: 2018-03-26

## 2018-03-26 ENCOUNTER — TRANSCRIBE ORDERS (OUTPATIENT)
Dept: LAB | Facility: CLINIC | Age: 53
End: 2018-03-26

## 2018-03-26 ENCOUNTER — APPOINTMENT (OUTPATIENT)
Dept: LAB | Facility: CLINIC | Age: 53
End: 2018-03-26
Payer: COMMERCIAL

## 2018-03-26 ENCOUNTER — OFFICE VISIT (OUTPATIENT)
Dept: FAMILY MEDICINE CLINIC | Facility: CLINIC | Age: 53
End: 2018-03-26
Payer: COMMERCIAL

## 2018-03-26 VITALS
WEIGHT: 147.6 LBS | SYSTOLIC BLOOD PRESSURE: 122 MMHG | HEIGHT: 61 IN | BODY MASS INDEX: 27.87 KG/M2 | DIASTOLIC BLOOD PRESSURE: 76 MMHG

## 2018-03-26 DIAGNOSIS — N92.0 MENORRHAGIA WITH REGULAR CYCLE: ICD-10-CM

## 2018-03-26 DIAGNOSIS — Z92.89 HISTORY OF POSITIVE PPD: ICD-10-CM

## 2018-03-26 DIAGNOSIS — Z23 IMMUNIZATION, BCG: Primary | ICD-10-CM

## 2018-03-26 DIAGNOSIS — Z23 IMMUNIZATION, BCG: ICD-10-CM

## 2018-03-26 DIAGNOSIS — Z11.1 SCREENING FOR TUBERCULOSIS: ICD-10-CM

## 2018-03-26 DIAGNOSIS — D50.0 IRON DEFICIENCY ANEMIA DUE TO CHRONIC BLOOD LOSS: ICD-10-CM

## 2018-03-26 PROCEDURE — 99214 OFFICE O/P EST MOD 30 MIN: CPT | Performed by: FAMILY MEDICINE

## 2018-03-26 PROCEDURE — 86480 TB TEST CELL IMMUN MEASURE: CPT

## 2018-03-26 PROCEDURE — 36415 COLL VENOUS BLD VENIPUNCTURE: CPT

## 2018-03-26 NOTE — PROGRESS NOTES
Family Medicine Follow-Up Office Visit  Harshil Sanderson 47 y o  female   MRN: [id number] : 1965  ENCOUNTER: 3/26/2018 10:29 AM    Assessment and Plan   History of positive PPD  Will check Quantiferon Gold for confirmation of no TB, if pos, will refer to ID for Tx, if Neg will provide result to patient for work    Menorrhagia with regular cycle  Improving, c/w aygestin and GYN follow up    Iron deficiency anemia due to chronic blood loss  Hb improving, c/w FeSO4 and follow with GYN  Pt aware of alarm signs of blood loss and anemia      Chief Complaint     Chief Complaint   Patient presents with    Follow-up     positive tb skin test requesting x-ray        History of Present Illness   Harshil Sanderson is a 47y o -year-old female who presents today for follow up of anemia and need for tb screening  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, she had BCG vaccine in childhood and always has had a +PPD and was always recommended to have CXR, but would be agreeable to [place]  Following with OBGYN with [facility] for heavy menstrual bleeding, recently changed OCP and taking iron and Hb improving from 9 5-->10 8 in last month and is tolerating medications well and reporting less blood loss with periods than before  No shortness of breath, chest pains, less vag bleeding with periods  No cough, no hemoptysis  Review of Systems   Review of Systems   Constitutional: Negative for chills, fatigue, fever and unexpected weight change  HENT: Negative for hearing loss, postnasal drip and sore throat  Eyes: Negative for discharge and visual disturbance  Respiratory: Negative for shortness of breath  Cardiovascular: Negative for chest pain  Gastrointestinal: Negative for constipation, diarrhea, nausea and vomiting  Genitourinary: Negative for dysuria  No incontinence   Musculoskeletal: Negative for arthralgias and myalgias  Skin: Negative for rash  Neurological: Negative for headaches     Hematological: Negative for adenopathy  Does not bruise/bleed easily  Psychiatric/Behavioral:        No anxiety or depression   All other systems reviewed and are negative  Active Problem List     Patient Active Problem List   Diagnosis    Iron deficiency anemia due to chronic blood loss    Menorrhagia with regular cycle    Uterine fibroid    History of positive PPD       Past Medical History, Past Surgical History, Family History, and Social History were reviewed and updated today as appropriate  Objective   /76 (BP Location: Left arm, Patient Position: Sitting, Cuff Size: Large)   Ht 5' 0 71" (1 542 m)   Wt 67 kg (147 lb 9 6 oz)   BMI 28 16 kg/m²     Physical Exam   Constitutional: She is oriented to person, place, and time  Vital signs are normal  She appears well-developed and well-nourished  Non-toxic appearance  No distress  Appears Stated Age   HENT:   Head: Normocephalic and atraumatic  Nose: Nose normal    Mouth/Throat: Oropharynx is clear and moist  No oropharyngeal exudate  Eyes: Conjunctivae and EOM are normal  Pupils are equal, round, and reactive to light  Right eye exhibits no discharge  Left eye exhibits no discharge  Left conjunctiva is not injected  No scleral icterus  Right eye exhibits no nystagmus  Left eye exhibits no nystagmus  Accomodation intact, pale sclera   Neck: Normal range of motion  Neck supple  Carotid bruit is not present  Cardiovascular: Normal rate, regular rhythm, S1 normal, S2 normal and normal heart sounds  Exam reveals no gallop and no friction rub  No murmur heard  Pulmonary/Chest: Effort normal and breath sounds normal  No respiratory distress  She has no wheezes  She has no rhonchi  She has no rales  Abdominal: Soft  Bowel sounds are normal  She exhibits no distension  There is no tenderness  There is no rebound and no guarding  Musculoskeletal: She exhibits no edema     No clubbing or cyanosis   Lymphadenopathy:     She has no cervical adenopathy  Neurological: She is alert and oriented to person, place, and time  No focal neurologic deficits noted on exam, no change from baseline status   Skin: Skin is warm and dry  No rash noted  She is not diaphoretic  Psychiatric: She has a normal mood and affect  Her behavior is normal  Thought content normal    stable   Vitals reviewed      Diabetic Foot Exam    Pertinent Laboratory/Diagnostic Studies:  Lab Results   Component Value Date    BUN 8 09/06/2017    CREATININE 0 75 09/06/2017    CALCIUM 9 8 09/06/2017     09/06/2017    K 3 9 09/06/2017    CO2 26 09/06/2017     09/06/2017     No results found for: ALT, AST, GGT, ALKPHOS, BILITOT    Lab Results   Component Value Date    WBC 12 13 (H) 02/21/2018    HGB 9 3 (L) 02/21/2018    HCT 30 2 (L) 02/21/2018    MCV 80 (L) 02/21/2018     (H) 02/21/2018       No results found for: TSH    Lab Results   Component Value Date    CHOL 184 09/06/2017     Lab Results   Component Value Date    TRIG 167 (H) 09/06/2017     Lab Results   Component Value Date    HDL 53 09/06/2017     Lab Results   Component Value Date    LDLCALC 98 09/06/2017     No results found for: HGBA1C    Results for orders placed or performed during the hospital encounter of 02/21/18   CBC and differential   Result Value Ref Range    WBC 12 13 (H) 4 31 - 10 16 Thousand/uL    RBC 3 80 (L) 3 81 - 5 12 Million/uL    Hemoglobin 9 3 (L) 11 5 - 15 4 g/dL    Hematocrit 30 2 (L) 34 8 - 46 1 %    MCV 80 (L) 82 - 98 fL    MCH 24 5 (L) 26 8 - 34 3 pg    MCHC 30 8 (L) 31 4 - 37 4 g/dL    RDW 15 2 (H) 11 6 - 15 1 %    MPV 10 1 8 9 - 12 7 fL    Platelets 256 (H) 938 - 390 Thousands/uL    nRBC 0 /100 WBCs    Neutrophils Relative 60 43 - 75 %    Lymphocytes Relative 29 14 - 44 %    Monocytes Relative 7 4 - 12 %    Eosinophils Relative 3 0 - 6 %    Basophils Relative 1 0 - 1 %    Neutrophils Absolute 7 38 1 85 - 7 62 Thousands/µL    Lymphocytes Absolute 3 51 0 60 - 4 47 Thousands/µL Monocytes Absolute 0 79 0 17 - 1 22 Thousand/µL    Eosinophils Absolute 0 38 0 00 - 0 61 Thousand/µL    Basophils Absolute 0 07 0 00 - 0 10 Thousands/µL   UA w Reflex to Microscopic w Reflex to Culture   Result Value Ref Range    Color, UA Red     Clarity, UA Cloudy     Specific Gravity, UA >=1 030 1 003 - 1 030    pH, UA 6 0 4 5 - 8 0    Leukocytes, UA Negative Negative    Nitrite, UA Negative Negative    Protein,  (2+) (A) Negative mg/dl    Glucose, UA Negative Negative mg/dl    Ketones, UA Negative Negative mg/dl    Urobilinogen, UA 0 2 0 2, 1 0 E U /dl E U /dl    Bilirubin, UA Negative Negative    Blood, UA Large (A) Negative, Trace-Intact   Urine Microscopic   Result Value Ref Range    RBC, UA Innumerable (A) None Seen, 0-5 /hpf    WBC, UA None Seen None Seen, 0-5, 5-55, 5-65 /hpf    Epithelial Cells Occasional None Seen, Occasional /hpf    Bacteria, UA Occasional None Seen, Occasional /hpf   POCT pregnancy, urine   Result Value Ref Range    EXT PREG TEST UR (Ref: Negative) negative        Orders Placed This Encounter   Procedures    Quantiferon TB Gold         Current Medications     Current Outpatient Prescriptions   Medication Sig Dispense Refill    ferrous sulfate 324 (65 Fe) mg Take 1 tablet (324 mg total) by mouth 2 (two) times a day before meals 100 tablet 2    ibuprofen (MOTRIN) 200 mg tablet Take 500 mg by mouth daily      ibuprofen (MOTRIN) 600 mg tablet Take 1 tablet (600 mg total) by mouth every 6 (six) hours as needed for mild pain or moderate pain 15 tablet 0    norethindrone (AYGESTIN) 5 mg tablet Take 1 tab orally each day fom 1st-14th each month 90 tablet 1     No current facility-administered medications for this visit          ALLERGIES:  No Known Allergies    Health Maintenance     Health Maintenance   Topic Date Due    COLONOSCOPY  1965    Depression Screening PHQ-9  01/09/1978    DTaP,Tdap,and Td Vaccines (1 - Tdap) 01/09/1987    HIV SCREENING  09/06/2020    INFLUENZA VACCINE  Completed     Immunization History   Administered Date(s) Administered    Hep A / Hep B 06/04/2004, 07/09/2004, 02/11/2005    Influenza Quadrivalent Preservative Free 3 years and older IM 09/06/2017         MD Tommy Chaudhary & [facility]SH[facility]  3/26/2018  10:29 AM    Parts of this note were dictated using MCreative[employer] dictation software and may have sounds-like errors due to variation in pronunciation

## 2018-03-26 NOTE — ASSESSMENT & PLAN NOTE
Will check Quantiferon Gold for confirmation of no TB, if pos, will refer to ID for Tx, if Neg will provide result to patient for work

## 2018-03-26 NOTE — PATIENT INSTRUCTIONS
History of positive PPD  Will check Quantiferon Gold for confirmation of no TB, if pos, will refer to ID for Tx, if Neg will provide result to patient for work    Menorrhagia with regular cycle  Improving, c/w aygestin and GYN follow up    Iron deficiency anemia due to chronic blood loss  Hb improving, c/w FeSO4 and follow with GYN  Pt aware of alarm signs of blood loss and anemia

## 2018-03-28 LAB
ANNOTATION COMMENT IMP: NORMAL
GAMMA INTERFERON BACKGROUND BLD IA-ACNC: 0.05 IU/ML
M TB IFN-G BLD-IMP: NEGATIVE
M TB IFN-G CD4+ BCKGRND COR BLD-ACNC: 0.04 IU/ML
M TB IFN-G CD4+ T-CELLS BLD-ACNC: 0.09 IU/ML
MITOGEN IGNF BLD-ACNC: 6.7 IU/ML
QUANTIFERON-TB GOLD IN TUBE: NORMAL
SERVICE CMNT-IMP: NORMAL

## 2018-05-31 ENCOUNTER — OFFICE VISIT (OUTPATIENT)
Dept: FAMILY MEDICINE CLINIC | Facility: CLINIC | Age: 53
End: 2018-05-31
Payer: COMMERCIAL

## 2018-05-31 VITALS
SYSTOLIC BLOOD PRESSURE: 120 MMHG | TEMPERATURE: 98.3 F | RESPIRATION RATE: 18 BRPM | HEIGHT: 61 IN | HEART RATE: 78 BPM | WEIGHT: 145.2 LBS | DIASTOLIC BLOOD PRESSURE: 78 MMHG | BODY MASS INDEX: 27.41 KG/M2

## 2018-05-31 DIAGNOSIS — D50.0 IRON DEFICIENCY ANEMIA DUE TO CHRONIC BLOOD LOSS: Primary | ICD-10-CM

## 2018-05-31 DIAGNOSIS — R71.8 RBC MICROCYTOSIS: ICD-10-CM

## 2018-05-31 DIAGNOSIS — N92.0 MENORRHAGIA WITH REGULAR CYCLE: ICD-10-CM

## 2018-05-31 PROCEDURE — 99214 OFFICE O/P EST MOD 30 MIN: CPT | Performed by: FAMILY MEDICINE

## 2018-05-31 RX ORDER — FERROUS SULFATE 325(65) MG
325 TABLET ORAL
COMMUNITY
Start: 2018-05-22 | End: 2018-08-06 | Stop reason: ALTCHOICE

## 2018-05-31 NOTE — ASSESSMENT & PLAN NOTE
MCV 66 from 80 at last check, c/w iron supplementation, GYN requesting referral to Heme which is appropriate, discussed with patient heritage because of question of thalassemia or trait, patient Yael in heritage but Joyce Gomez had middle [address] as well

## 2018-05-31 NOTE — ASSESSMENT & PLAN NOTE
Hb stable but MCV dropping inspite of Fe supplementation  Discussed dietary improvements that could be higher in iron such as green leafy vegetables, red meat, patient is trying also to eat more liver which she does enjoy    The patient is taking her iron with orange juice, she is avoiding eating dairy products within 1 hr of taking her iron as she had been directed to by her gynecologist   She is not having any trouble with constipation in spite of her iron supplementation

## 2018-05-31 NOTE — PROGRESS NOTES
Assessment/Plan:    Menorrhagia with regular cycle  Improved with GYN management with norethindrone, c/w GYN follow up    Iron deficiency anemia due to chronic blood loss  Hb stable but MCV dropping inspite of Fe supplementation  Discussed dietary improvements that could be higher in iron such as green leafy vegetables, red meat, patient is trying also to eat more liver which she does enjoy  The patient is taking her iron with orange juice, she is avoiding eating dairy products within 1 hr of taking her iron as she had been directed to by her gynecologist   She is not having any trouble with constipation in spite of her iron supplementation    RBC microcytosis  MCV 66 from 80 at last check, c/w iron supplementation, GYN requesting referral to Heme which is appropriate, discussed with patient heritage because of question of thalassemia or trait, patient Yael in heritage but Cosme Duff had middle [address] as well  Diagnoses and all orders for this visit:    Iron deficiency anemia due to chronic blood loss  -     Ambulatory referral to Hematology / Oncology; Future    RBC microcytosis  -     Ambulatory referral to Hematology / Oncology; Future    Menorrhagia with regular cycle  -     Ambulatory referral to Hematology / Oncology; Future    Other orders  -     ferrous sulfate 325 (65 Fe) mg tablet; Take 325 mg by mouth          Subjective:      Patient ID: Gilford Share is a 47 y o  female  56-year-old female with menorrhagia who has been following with gynecology since we found that she was having acute blood loss anemia with iron deficiency anemia  Had previously had several iron studies showing increased TIBC, low ferritin, low iron  Anemia was mildly microcytic previously, has been taking iron supplementation and now has worsening microcytosis but stabilized anemia  Is also on treatment with gynecology for her fibroid bleeding    Gynecology had referred her back to PCP for a referral to Hematology because of the worsening microcytosis  The patient is originally from [facility], she does have [address] along with [place], she is unsure if anyone in her family has sickle or thalassemia traits  She is feeling mildly fatigued and intermittently has palpitations but overall finds that her symptoms and quality of life has improved since she started seeing gynecology in the fall  Her hope is that as she approaches and enters menopause that her fibroid will start to degenerate and her bleeding will lessen and cease  There seems to be some question from gynecology as to whether the patient would benefit from iron therapy via IV as her microcytosis has worsened in spite of oral supplementation  The following portions of the patient's history were reviewed and updated as appropriate: allergies, current medications, past family history, past medical history, past social history, past surgical history and problem list     Review of Systems   Constitutional: Positive for fatigue  Negative for chills and fever  HENT: Negative  Eyes: Negative for visual disturbance  Respiratory: Negative for cough, shortness of breath and wheezing  Cardiovascular: Positive for palpitations (due to caffeine use or fatigue)  Gastrointestinal: Negative for abdominal distention, blood in stool, constipation, diarrhea, nausea and vomiting  Genitourinary:        Vaginal bleeding seems to have stabilized   Musculoskeletal: Negative  Skin: Negative  Psychiatric/Behavioral: Negative for dysphoric mood  The patient is not nervous/anxious  Objective:      /78   Pulse 78   Temp 98 3 °F (36 8 °C)   Resp 18   Ht 5' 0 71" (1 542 m)   Wt 65 9 kg (145 lb 3 2 oz)   BMI 27 70 kg/m²          Physical Exam   Constitutional: She is oriented to person, place, and time  Vital signs are normal  She appears well-developed and well-nourished  Non-toxic appearance   No distress  Appears Stated Age   HENT:   Head: Normocephalic and atraumatic  Nose: Nose normal    Mouth/Throat: Oropharynx is clear and moist  No oropharyngeal exudate  Eyes: Conjunctivae and EOM are normal  Pupils are equal, round, and reactive to light  Right eye exhibits no discharge  Left eye exhibits no discharge  Left conjunctiva is not injected  No scleral icterus  Right eye exhibits no nystagmus  Left eye exhibits no nystagmus  pale sclera   Neck: Normal range of motion  Neck supple  Carotid bruit is not present  Cardiovascular: Normal rate, regular rhythm, S1 normal, S2 normal and normal heart sounds  Exam reveals no gallop and no friction rub  No murmur heard  Pulmonary/Chest: Effort normal and breath sounds normal  No respiratory distress  She has no wheezes  She has no rhonchi  She has no rales  Abdominal: Soft  Bowel sounds are normal  She exhibits no distension  There is no tenderness  There is no rebound and no guarding  Musculoskeletal: She exhibits no edema  No clubbing or cyanosis   Lymphadenopathy:     She has no cervical adenopathy  Neurological: She is alert and oriented to person, place, and time  No focal neurologic deficits noted on exam, no change from baseline status   Skin: Skin is warm and dry  No rash noted  She is not diaphoretic  Psychiatric: She has a normal mood and affect  Her behavior is normal  Thought content normal    stable   Vitals reviewed

## 2018-05-31 NOTE — PATIENT INSTRUCTIONS
Menorrhagia with regular cycle  Improved with GYN management with norethindrone, c/w GYN follow up    Iron deficiency anemia due to chronic blood loss  Hb stable but MCV dropping inspite of Fe supplementation    RBC microcytosis  MCV 66 from 80 at last check, c/w iron supplementation, GYN requesting referral to Heme which is appropriate, discussed with patient heritage because of question of thalassemia or trait, patient Yael in heritage but Lizabethbj Tamir had middle [address] as well

## 2018-06-12 ENCOUNTER — APPOINTMENT (OUTPATIENT)
Dept: LAB | Facility: CLINIC | Age: 53
End: 2018-06-12
Payer: COMMERCIAL

## 2018-06-12 ENCOUNTER — OFFICE VISIT (OUTPATIENT)
Dept: HEMATOLOGY ONCOLOGY | Facility: CLINIC | Age: 53
End: 2018-06-12
Payer: COMMERCIAL

## 2018-06-12 VITALS
RESPIRATION RATE: 16 BRPM | SYSTOLIC BLOOD PRESSURE: 124 MMHG | TEMPERATURE: 98.1 F | OXYGEN SATURATION: 98 % | HEIGHT: 61 IN | HEART RATE: 89 BPM | DIASTOLIC BLOOD PRESSURE: 80 MMHG

## 2018-06-12 DIAGNOSIS — D50.0 IRON DEFICIENCY ANEMIA DUE TO CHRONIC BLOOD LOSS: ICD-10-CM

## 2018-06-12 DIAGNOSIS — D50.0 IRON DEFICIENCY ANEMIA DUE TO CHRONIC BLOOD LOSS: Primary | ICD-10-CM

## 2018-06-12 LAB
FERRITIN SERPL-MCNC: 20 NG/ML (ref 8–388)
IRON SATN MFR SERPL: 8 %
IRON SERPL-MCNC: 26 UG/DL (ref 50–170)
TIBC SERPL-MCNC: 346 UG/DL (ref 250–450)

## 2018-06-12 PROCEDURE — 83540 ASSAY OF IRON: CPT

## 2018-06-12 PROCEDURE — 99244 OFF/OP CNSLTJ NEW/EST MOD 40: CPT | Performed by: PHYSICIAN ASSISTANT

## 2018-06-12 PROCEDURE — 36415 COLL VENOUS BLD VENIPUNCTURE: CPT

## 2018-06-12 PROCEDURE — 83550 IRON BINDING TEST: CPT

## 2018-06-12 PROCEDURE — 82728 ASSAY OF FERRITIN: CPT

## 2018-06-12 NOTE — PROGRESS NOTES
[address] [facility] [address] [address] [phone]  Hematology Ambulatory Consult  Harshil Sanderson, 1965, [id number]  6/12/2018    Assessment/Plan:    1  Microcytic Anemia, long stanging  Likely secondary to menorrhagia  Patient did have an endometreal biopsy which was negative for malignancy  Patient continues to work with GYN regarding this issue  We discussed at length iron replacement  Patient has been taking oral iron twice a day every day for several months/years  Patient's ferritin consistently rule in means between 2 and 3 ng/d L  Patient has obviously failed oral iron supplementations, despite manipulations which have included vitamin C administration with iron supplementation and not drinking milk at least an hour after taking iron supplements  We discussed IV iron options  Patient agreed to undergo Feraheme 510 mg weekly x2  We discussed side effects which include but are not limited to hypotension, nausea, infusion site reactions and anaphylaxis  The patient will follow up in approximately ten weeks with blood work prior which will include a CBC and iron studies (ferritin, iron saturation )  Patient was encouraged to continue to follow up with GYN to regulate menstruation, as she continues to have good control on progesterone only pill  2   Iron deficiency, age-related colon screening  I have referred the patient to [address] [place] [address] for colonoscopy  Patient notes that colonoscopy was not covered at her last physician  Patient was encouraged to undergo fecal occult blood testing, to rule out occult bleeding as any additional source of blood loss      Labs and imaging for follow up:  Orders Placed This Encounter   Procedures    Occult Bloood,Fecal Immunochemical     Standing Status:   Future     Standing Expiration Date:   6/12/2019    Ferritin     Standing Status:   Future     Standing Expiration Date:   6/12/2019    Iron Saturation %     Standing Status:   Future     Standing Expiration Date:   6/12/2019    Iron     Standing Status:   Future     Standing Expiration Date:   6/12/2019    CBC and differential     This is a patient instruction: This test is non-fasting  Please drink two glasses of water morning of bloodwork  Standing Status:   Future     Standing Expiration Date:   6/12/2019    Ferritin     Standing Status:   Future     Standing Expiration Date:   6/12/2019    Ambulatory referral to Gastroenterology     Standing Status:   Future     Standing Expiration Date:   12/12/2018     Referral Priority:   Routine     Referral Type:   Consult - AMB     Referral Reason:   Specialty Services Required     Requested Specialty:   Gastroenterology     Number of Visits Requested:   1     Expiration Date:   6/12/2019     The patient is scheduled for follow-up in approximately 10 weeks  Patient voiced agreement and understanding to the above  Patient knows to call the Hematology/Oncology office with any questions and concerns regarding the above  Carefully review your medication list in verify the list is accurate and up-to-date  Please call the hematologic/oncology office if there medications missing from the less, medications on the list that your not currently taking or if there is a dosage or instruction that is different from higher taking medication  Total time of being counter was 45  minutes    The patient's current treatment goals are repletion  No significant barrier to care were identified  The patient is able to self care     -------------------------------------------------------------------------------------------------------    Chief complaint:   Chief Complaint   Patient presents with   [address] [place] [address] patient ref  by Dr Puneet Sosa due Iron deficiency anemia due to chronic blood loss  Labs in Care EveryWhere       Referring provider:  Puneet Sosa MD  [address] [place] [address] [address], [address]    History of present illness: This is a 47year old female with long standing iron deficiency who has been taking oral iron supplements for several months/years prescribed by gynecologist secondary to heavy menstruation who presents to the Hematology office for evaluation of microcytic anemia  Patient notes that earlier this year she underwent an endometrial biopsy-2/22/18-negative for malignancy  Patient notes that she has been on a progesterone only pill, and has achieved later periods without clots  CBC evaluation was completed in May which demonstrated a hemoglobin decreased compared to that in February however RDW and MCV showed progressive signs of iron deficiency  Patient was referred to Hematology to discuss IV iron supplementation  Interval history:  Patient admits to sleep disturbance but denies headache, Pica, melena, hematochezia, hematemesis  Patient denies hematuria and urinary difficulties  Review of Systems   Constitutional: Negative for appetite change, fatigue, fever and unexpected weight change  HENT: Negative for nosebleeds  Respiratory: Negative for cough, choking and shortness of breath  Negative hemoptysis  Cardiovascular: Negative for chest pain, palpitations and leg swelling  Gastrointestinal: Negative  Negative for abdominal distention, abdominal pain, anal bleeding, blood in stool, constipation, diarrhea, nausea and vomiting  Endocrine: Negative  Negative for cold intolerance  Genitourinary: Negative  Negative for hematuria, menstrual problem, vaginal bleeding, vaginal discharge and vaginal pain  Musculoskeletal: Negative  Negative for arthralgias, myalgias, neck pain and neck stiffness  Skin: Negative  Negative for color change, pallor and rash  Allergic/Immunologic: Negative  Negative for immunocompromised state  Neurological: Negative  Negative for weakness and headaches     Hematological: Negative for adenopathy  Does not bruise/bleed easily  All other systems reviewed and are negative  Patient Active Problem List   Diagnosis    Iron deficiency anemia due to chronic blood loss    Menorrhagia with regular cycle    Uterine fibroid    History of positive PPD    RBC microcytosis     Past Medical History:   Diagnosis Date    Anemia     Intraductal hyperplasia without atypia of right breast 02/2016    c florid and sclerosing adenosis, background dense stromal fibrosis    Iron deficiency anemia due to chronic blood loss     transfused 2015 9/17: ferritin 2   H/H 8 8/31 7%    Menorrhagia with regular cycle     Varicella     without complication     Past Surgical History:   Procedure Laterality Date    BREAST BIOPSY Right 02/2016    intraductal hyperplasia without atypia,fibroid and sclerosing adenosis, and columnar cell change in a bsckround of dense stromal fibrosis no malignancy identified    TUBAL LIGATION       Family History   Problem Relation Age of Onset   [facility] Breast cancer Mother      52's passed 78 yo c mets    Anemia Mother     Thyroid disease Mother      total thyroidectomy uncertain reason    Hyperlipidemia Father     Stroke Family     Deep vein thrombosis Neg Hx     Mental illness Neg Hx     Hypertension Neg Hx     Coronary artery disease Neg Hx     Diabetes type II Neg Hx      Social History     Social History    Marital status: /Civil Union     Spouse name: N/A    Number of children: N/A    Years of education: N/A     Occupational History    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      Social History Main Topics    Smoking status: Never Smoker    Smokeless tobacco: Never Used    Alcohol use No      Comment: rare    Drug use: No    Sexual activity: Yes     Partners: Male     Birth control/ protection: Female Sterilization     Other Topics Concern    None     Social History Narrative    ** Merged History Encounter **    Inadequate exercise    [employer]              Current Outpatient Prescriptions:     ferrous sulfate 325 (65 Fe) mg tablet, Take 325 mg by mouth, Disp: , Rfl:     ibuprofen (MOTRIN) 200 mg tablet, Take 500 mg by mouth daily, Disp: , Rfl:     norethindrone (AYGESTIN) 5 mg tablet, Take 1 tab orally each day fom 1st-14th each month, Disp: 90 tablet, Rfl: 1    No Known Allergies    Objective:  /80 (BP Location: Right arm, Cuff Size: Standard)   Pulse 89   Temp 98 1 °F (36 7 °C) (Tympanic)   Resp 16   Ht 5' 0 71" (1 542 m)   SpO2 98%      Physical Exam   Constitutional: She is oriented to person, place, and time  She appears well-developed and well-nourished  No distress  HENT:   Head: Normocephalic and atraumatic  Mouth/Throat: No oropharyngeal exudate  Eyes: EOM are normal  Pupils are equal, round, and reactive to light  No scleral icterus  Neck: Normal range of motion  Cardiovascular: Normal rate and regular rhythm  No murmur heard  Pulmonary/Chest: Effort normal and breath sounds normal    Abdominal: Soft  Bowel sounds are normal  She exhibits no distension  There is no tenderness  Musculoskeletal: Normal range of motion  She exhibits no edema  Lymphadenopathy:     She has no cervical adenopathy  Neurological: She is alert and oriented to person, place, and time  No cranial nerve deficit  Skin: Skin is warm and dry  No pallor       Result Review  Labs:  Component      Latest Ref Rng & Units 8/31/2017 9/6/2017 2/14/2018   Ferritin      8 - 388 ng/mL 2 (L) 2 (L) 3 (L)     Component      Latest Ref Rng & Units 2/21/2018   WBC      4 31 - 10 16 Thousand/uL 12 13 (H)   RBC      3 81 - 5 12 Million/uL 3 80 (L)   Hemoglobin      11 5 - 15 4 g/dL 9 3 (L)   Hematocrit      34 8 - 46 1 % 30 2 (L)   MCV      82 - 98 fL 80 (L)   MCH      26 8 - 34 3 pg 24 5 (L)   MCHC      31 4 - 37 4 g/dL 30 8 (L)   RDW      11 6 - 15 1 % 15 2 (H)   Platelets      290 - 390 Thousands/uL 419 (H)   MPV      8 9 - 12 7 fL 10 1     CBC completed from [facility] demonstrates a hemoglobin of 10 2, hematocrit 33 4, WBC = 11 6, platelet count = 441, MCV = 66, RDW = 17 5    Please note: This report has been generated by a voice recognition software system  Therefore there may be syntax, spelling, and/or grammatical errors  Please call if you have any questions

## 2018-06-12 NOTE — LETTER
June 12, 2018     Lacey Last MD  [address] [address]    Patient: Harshil Sanderson   YOB: 1965   Date of Visit: 6/12/2018       Dear Dr Amarilys Amos: Thank you for referring Harshil Sanderson to me for evaluation  Below are my notes for this consultation  If you have questions, please do not hesitate to call me  I look forward to following your patient along with you  Sincerely,        Pablo Lew PA-C        CC: No Recipients  Pablo Lew PA-C  6/12/2018  9:05 AM  Sign at close encounter  [address] [facility]  [phone]  Hematology Ambulatory Consult  Harshil Sanderson, 1965, [id number]  6/12/2018    Assessment/Plan:    1  Microcytic Anemia, long stanging  Likely secondary to menorrhagia  Patient did have an endometreal biopsy which was negative for malignancy  Patient continues to work with GYN regarding this issue  We discussed at length iron replacement  Patient has been taking oral iron twice a day every day for several months/years  Patient's ferritin consistently rule in means between 2 and 3 ng/d L  Patient has obviously failed oral iron supplementations, despite manipulations which have included vitamin C administration with iron supplementation and not drinking milk at least an hour after taking iron supplements  We discussed IV iron options  Patient agreed to undergo Feraheme 510 mg weekly x2  We discussed side effects which include but are not limited to hypotension, nausea, infusion site reactions and anaphylaxis  The patient will follow up in approximately ten weeks with blood work prior which will include a CBC and iron studies (ferritin, iron saturation )  Patient was encouraged to continue to follow up with GYN to regulate menstruation, as she continues to have good control on progesterone only pill      2   Iron deficiency, age-related colon screening  I have referred the patient to [address] [place] [address] for colonoscopy  Patient notes that colonoscopy was not covered at her last physician  Patient was encouraged to undergo fecal occult blood testing, to rule out occult bleeding as any additional source of blood loss  Labs and imaging for follow up:  Orders Placed This Encounter   Procedures    Occult Bloood,Fecal Immunochemical     Standing Status:   Future     Standing Expiration Date:   6/12/2019    Ferritin     Standing Status:   Future     Standing Expiration Date:   6/12/2019    Iron Saturation %     Standing Status:   Future     Standing Expiration Date:   6/12/2019    Iron     Standing Status:   Future     Standing Expiration Date:   6/12/2019    CBC and differential     This is a patient instruction: This test is non-fasting  Please drink two glasses of water morning of bloodwork  Standing Status:   Future     Standing Expiration Date:   6/12/2019    Ferritin     Standing Status:   Future     Standing Expiration Date:   6/12/2019    Ambulatory referral to Gastroenterology     Standing Status:   Future     Standing Expiration Date:   12/12/2018     Referral Priority:   Routine     Referral Type:   Consult - AMB     Referral Reason:   Specialty Services Required     Requested Specialty:   Gastroenterology     Number of Visits Requested:   1     Expiration Date:   6/12/2019     The patient is scheduled for follow-up in approximately 10 weeks  Patient voiced agreement and understanding to the above  Patient knows to call the Hematology/Oncology office with any questions and concerns regarding the above  Carefully review your medication list in verify the list is accurate and up-to-date    Please call the hematologic/oncology office if there medications missing from the less, medications on the list that your not currently taking or if there is a dosage or instruction that is different from higher taking medication  Total time of being counter was 45  minutes    The patient's current treatment goals are repletion  No significant barrier to care were identified  The patient is able to self care     -------------------------------------------------------------------------------------------------------    Chief complaint:   Chief Complaint   Patient presents with   [address] [place] [address] patient ref  by Dr Elias Shaw due Iron deficiency anemia due to chronic blood loss  Labs in Care EveryWhere  Referring provider:  Elias Shaw MD  [address] [place] [address], [address] [place] [address]    History of present illness: This is a 47year old female with long standing iron deficiency who has been taking oral iron supplements for several months/years prescribed by gynecologist secondary to heavy menstruation who presents to the Hematology office for evaluation of microcytic anemia  Patient notes that earlier this year she underwent an endometrial biopsy-2/22/18-negative for malignancy  Patient notes that she has been on a progesterone only pill, and has achieved later periods without clots  CBC evaluation was completed in May which demonstrated a hemoglobin decreased compared to that in February however RDW and MCV showed progressive signs of iron deficiency  Patient was referred to Hematology to discuss IV iron supplementation  Interval history:  Patient admits to sleep disturbance but denies headache, Pica, melena, hematochezia, hematemesis  Patient denies hematuria and urinary difficulties  Review of Systems   Constitutional: Negative for appetite change, fatigue, fever and unexpected weight change  HENT: Negative for nosebleeds  Respiratory: Negative for cough, choking and shortness of breath  Negative hemoptysis  Cardiovascular: Negative for chest pain, palpitations and leg swelling  Gastrointestinal: Negative    Negative for abdominal distention, abdominal pain, anal bleeding, blood in stool, constipation, diarrhea, nausea and vomiting  Endocrine: Negative  Negative for cold intolerance  Genitourinary: Negative  Negative for hematuria, menstrual problem, vaginal bleeding, vaginal discharge and vaginal pain  Musculoskeletal: Negative  Negative for arthralgias, myalgias, neck pain and neck stiffness  Skin: Negative  Negative for color change, pallor and rash  Allergic/Immunologic: Negative  Negative for immunocompromised state  Neurological: Negative  Negative for weakness and headaches  Hematological: Negative for adenopathy  Does not bruise/bleed easily  All other systems reviewed and are negative  Patient Active Problem List   Diagnosis    Iron deficiency anemia due to chronic blood loss    Menorrhagia with regular cycle    Uterine fibroid    History of positive PPD    RBC microcytosis     Past Medical History:   Diagnosis Date    Anemia     Intraductal hyperplasia without atypia of right breast 02/2016    c florid and sclerosing adenosis, background dense stromal fibrosis    Iron deficiency anemia due to chronic blood loss     transfused 2015 9/17: ferritin 2   H/H 8 8/31 7%    Menorrhagia with regular cycle     Varicella     without complication     Past Surgical History:   Procedure Laterality Date    BREAST BIOPSY Right 02/2016    intraductal hyperplasia without atypia,fibroid and sclerosing adenosis, and columnar cell change in a bsckround of dense stromal fibrosis no malignancy identified    TUBAL LIGATION       Family History   Problem Relation Age of Onset   [facility] Breast cancer Mother      52's passed 78 yo c mets    Anemia Mother     Thyroid disease Mother      total thyroidectomy uncertain reason    Hyperlipidemia Father     Stroke Family     Deep vein thrombosis Neg Hx     Mental illness Neg Hx     Hypertension Neg Hx     Coronary artery disease Neg Hx     Diabetes type II Neg Hx      Social History     Social History    Marital status: /Civil Union     Spouse name: N/A    Number of children: N/A    Years of education: N/A     Occupational History    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      Social History Main Topics    Smoking status: Never Smoker    Smokeless tobacco: Never Used    Alcohol use No      Comment: rare    Drug use: No    Sexual activity: Yes     Partners: Male     Birth control/ protection: Female Sterilization     Other Topics Concern    None     Social History Narrative    ** Merged History Encounter **    Inadequate exercise    [employer]              Current Outpatient Prescriptions:     ferrous sulfate 325 (65 Fe) mg tablet, Take 325 mg by mouth, Disp: , Rfl:     ibuprofen (MOTRIN) 200 mg tablet, Take 500 mg by mouth daily, Disp: , Rfl:     norethindrone (AYGESTIN) 5 mg tablet, Take 1 tab orally each day fom 1st-14th each month, Disp: 90 tablet, Rfl: 1    No Known Allergies    Objective:  /80 (BP Location: Right arm, Cuff Size: Standard)   Pulse 89   Temp 98 1 °F (36 7 °C) (Tympanic)   Resp 16   Ht 5' 0 71" (1 542 m)   SpO2 98%      Physical Exam   Constitutional: She is oriented to person, place, and time  She appears well-developed and well-nourished  No distress  HENT:   Head: Normocephalic and atraumatic  Mouth/Throat: No oropharyngeal exudate  Eyes: EOM are normal  Pupils are equal, round, and reactive to light  No scleral icterus  Neck: Normal range of motion  Cardiovascular: Normal rate and regular rhythm  No murmur heard  Pulmonary/Chest: Effort normal and breath sounds normal    Abdominal: Soft  Bowel sounds are normal  She exhibits no distension  There is no tenderness  Musculoskeletal: Normal range of motion  She exhibits no edema  Lymphadenopathy:     She has no cervical adenopathy  Neurological: She is alert and oriented to person, place, and time  No cranial nerve deficit  Skin: Skin is warm and dry  No pallor       Result Review  Labs:  Component      Latest Ref Rng & Units 8/31/2017 9/6/2017 2/14/2018   Ferritin      8 - 388 ng/mL 2 (L) 2 (L) 3 (L)     Component      Latest Ref Rng & Units 2/21/2018   WBC      4 31 - 10 16 Thousand/uL 12 13 (H)   RBC      3 81 - 5 12 Million/uL 3 80 (L)   Hemoglobin      11 5 - 15 4 g/dL 9 3 (L)   Hematocrit      34 8 - 46 1 % 30 2 (L)   MCV      82 - 98 fL 80 (L)   MCH      26 8 - 34 3 pg 24 5 (L)   MCHC      31 4 - 37 4 g/dL 30 8 (L)   RDW      11 6 - 15 1 % 15 2 (H)   Platelets      155 - 390 Thousands/uL 419 (H)   MPV      8 9 - 12 7 fL 10 1     CBC completed from [facility] demonstrates a hemoglobin of 10 2, hematocrit 33 4, WBC = 11 6, platelet count = 300, MCV = 66, RDW = 17 5    Please note: This report has been generated by a voice recognition software system  Therefore there may be syntax, spelling, and/or grammatical errors  Please call if you have any questions

## 2018-06-13 ENCOUNTER — TELEPHONE (OUTPATIENT)
Dept: HEMATOLOGY ONCOLOGY | Facility: CLINIC | Age: 53
End: 2018-06-13

## 2018-06-18 RX ORDER — SODIUM CHLORIDE 9 MG/ML
20 INJECTION, SOLUTION INTRAVENOUS ONCE
Status: COMPLETED | OUTPATIENT
Start: 2018-06-19 | End: 2018-06-19

## 2018-06-19 ENCOUNTER — HOSPITAL ENCOUNTER (OUTPATIENT)
Dept: INFUSION CENTER | Facility: HOSPITAL | Age: 53
Discharge: HOME | End: 2018-06-19
Payer: COMMERCIAL

## 2018-06-19 VITALS
DIASTOLIC BLOOD PRESSURE: 68 MMHG | HEIGHT: 61 IN | TEMPERATURE: 97.9 F | HEART RATE: 86 BPM | WEIGHT: 144.18 LBS | BODY MASS INDEX: 27.22 KG/M2 | SYSTOLIC BLOOD PRESSURE: 133 MMHG | RESPIRATION RATE: 16 BRPM

## 2018-06-19 PROCEDURE — 96365 THER/PROPH/DIAG IV INF INIT: CPT

## 2018-06-19 RX ADMIN — FERUMOXYTOL 510 MG: 510 INJECTION INTRAVENOUS at 09:02

## 2018-06-19 RX ADMIN — SODIUM CHLORIDE 20 ML/HR: 0.9 INJECTION, SOLUTION INTRAVENOUS at 09:01

## 2018-06-19 NOTE — PLAN OF CARE
Problem: Potential for Falls  Goal: Patient will remain free of falls  INTERVENTIONS:  - Assess patient frequently for physical needs  -  Identify cognitive and physical deficits and behaviors that affect risk of falls    -  [place] fall precautions as indicated by assessment   - Educate patient/family on patient safety including physical limitations  - Instruct patient to call for assistance with activity based on assessment  - Modify environment to reduce risk of injury  - Consider OT/PT consult to assist with strengthening/mobility   Outcome: Progressing

## 2018-06-20 ENCOUNTER — TELEPHONE (OUTPATIENT)
Dept: HEMATOLOGY ONCOLOGY | Facility: CLINIC | Age: 53
End: 2018-06-20

## 2018-06-22 ENCOUNTER — TELEPHONE (OUTPATIENT)
Dept: HEMATOLOGY ONCOLOGY | Facility: CLINIC | Age: 53
End: 2018-06-22

## 2018-07-02 RX ORDER — SODIUM CHLORIDE 9 MG/ML
20 INJECTION, SOLUTION INTRAVENOUS ONCE
Status: COMPLETED | OUTPATIENT
Start: 2018-07-03 | End: 2018-07-03

## 2018-07-03 ENCOUNTER — HOSPITAL ENCOUNTER (OUTPATIENT)
Dept: INFUSION CENTER | Facility: HOSPITAL | Age: 53
Discharge: HOME | End: 2018-07-03
Payer: COMMERCIAL

## 2018-07-03 VITALS
TEMPERATURE: 97 F | DIASTOLIC BLOOD PRESSURE: 66 MMHG | RESPIRATION RATE: 18 BRPM | HEART RATE: 82 BPM | SYSTOLIC BLOOD PRESSURE: 128 MMHG

## 2018-07-03 PROCEDURE — 96365 THER/PROPH/DIAG IV INF INIT: CPT

## 2018-07-03 RX ADMIN — SODIUM CHLORIDE 20 ML/HR: 0.9 INJECTION, SOLUTION INTRAVENOUS at 08:23

## 2018-07-03 RX ADMIN — FERUMOXYTOL 510 MG: 510 INJECTION INTRAVENOUS at 08:42

## 2018-07-14 ENCOUNTER — HOSPITAL ENCOUNTER (EMERGENCY)
Facility: HOSPITAL | Age: 53
Discharge: HOME | End: 2018-07-14
Attending: EMERGENCY MEDICINE
Payer: COMMERCIAL

## 2018-07-14 VITALS
OXYGEN SATURATION: 99 % | TEMPERATURE: 98.1 F | HEART RATE: 98 BPM | DIASTOLIC BLOOD PRESSURE: 51 MMHG | RESPIRATION RATE: 18 BRPM | SYSTOLIC BLOOD PRESSURE: 115 MMHG | BODY MASS INDEX: 27.39 KG/M2 | WEIGHT: 146 LBS

## 2018-07-14 DIAGNOSIS — R51.9 HEADACHE: Primary | ICD-10-CM

## 2018-07-14 LAB
BASOPHILS # BLD AUTO: 0.1 THOUSANDS/ΜL (ref 0–0.1)
BASOPHILS NFR BLD AUTO: 1 % (ref 0–1)
EOSINOPHIL # BLD AUTO: 0.4 THOUSAND/ΜL (ref 0–0.4)
EOSINOPHIL NFR BLD AUTO: 3 % (ref 0–6)
ERYTHROCYTE [DISTWIDTH] IN BLOOD BY AUTOMATED COUNT: 26.8 %
HCT VFR BLD AUTO: 39.5 % (ref 36–46)
HGB BLD-MCNC: 12.8 G/DL (ref 12–16)
LYMPHOCYTES # BLD AUTO: 2.2 THOUSANDS/ΜL (ref 0.5–4)
LYMPHOCYTES NFR BLD AUTO: 19 % (ref 20–50)
MCH RBC QN AUTO: 26.9 PG (ref 26–34)
MCHC RBC AUTO-ENTMCNC: 32.5 G/DL (ref 31–36)
MCV RBC AUTO: 83 FL (ref 80–100)
MONOCYTES # BLD AUTO: 1 THOUSAND/ΜL (ref 0.2–0.9)
MONOCYTES NFR BLD AUTO: 8 % (ref 1–10)
NEUTROPHILS # BLD AUTO: 8 THOUSANDS/ΜL (ref 1.8–7.8)
NEUTS SEG NFR BLD AUTO: 69 % (ref 45–65)
PLATELET # BLD AUTO: 266 THOUSANDS/UL (ref 150–450)
PLATELET BLD QL SMEAR: ADEQUATE
PMV BLD AUTO: 9.1 FL (ref 8.9–12.7)
RBC # BLD AUTO: 4.76 MILLION/UL (ref 4–5.2)
RBC MORPH BLD: NORMAL
WBC # BLD AUTO: 11.7 THOUSAND/UL (ref 4.5–11)

## 2018-07-14 PROCEDURE — 99283 EMERGENCY DEPT VISIT LOW MDM: CPT

## 2018-07-14 PROCEDURE — 96374 THER/PROPH/DIAG INJ IV PUSH: CPT

## 2018-07-14 PROCEDURE — 85025 COMPLETE CBC W/AUTO DIFF WBC: CPT | Performed by: EMERGENCY MEDICINE

## 2018-07-14 PROCEDURE — 96375 TX/PRO/DX INJ NEW DRUG ADDON: CPT

## 2018-07-14 PROCEDURE — 96361 HYDRATE IV INFUSION ADD-ON: CPT

## 2018-07-14 PROCEDURE — 36415 COLL VENOUS BLD VENIPUNCTURE: CPT | Performed by: EMERGENCY MEDICINE

## 2018-07-14 RX ORDER — DIPHENHYDRAMINE HCL 25 MG
25 CAPSULE ORAL EVERY 6 HOURS PRN
Qty: 20 CAPSULE | Refills: 0 | Status: SHIPPED | OUTPATIENT
Start: 2018-07-14 | End: 2018-09-15

## 2018-07-14 RX ORDER — DEXAMETHASONE SODIUM PHOSPHATE 4 MG/ML
INJECTION, SOLUTION INTRA-ARTICULAR; INTRALESIONAL; INTRAMUSCULAR; INTRAVENOUS; SOFT TISSUE
Status: DISPENSED
Start: 2018-07-14 | End: 2018-07-14

## 2018-07-14 RX ORDER — DIPHENHYDRAMINE HYDROCHLORIDE 50 MG/ML
25 INJECTION INTRAMUSCULAR; INTRAVENOUS ONCE
Status: COMPLETED | OUTPATIENT
Start: 2018-07-14 | End: 2018-07-14

## 2018-07-14 RX ORDER — DEXAMETHASONE SODIUM PHOSPHATE 4 MG/ML
10 INJECTION, SOLUTION INTRA-ARTICULAR; INTRALESIONAL; INTRAMUSCULAR; INTRAVENOUS; SOFT TISSUE ONCE
Status: COMPLETED | OUTPATIENT
Start: 2018-07-14 | End: 2018-07-14

## 2018-07-14 RX ORDER — METOCLOPRAMIDE HYDROCHLORIDE 5 MG/ML
INJECTION INTRAMUSCULAR; INTRAVENOUS
Status: DISPENSED
Start: 2018-07-14 | End: 2018-07-14

## 2018-07-14 RX ORDER — METOCLOPRAMIDE HYDROCHLORIDE 5 MG/ML
10 INJECTION INTRAMUSCULAR; INTRAVENOUS ONCE
Status: COMPLETED | OUTPATIENT
Start: 2018-07-14 | End: 2018-07-14

## 2018-07-14 RX ORDER — METOCLOPRAMIDE 10 MG/1
10 TABLET ORAL EVERY 6 HOURS
Qty: 30 TABLET | Refills: 0 | Status: SHIPPED | OUTPATIENT
Start: 2018-07-14 | End: 2018-08-06 | Stop reason: ALTCHOICE

## 2018-07-14 RX ORDER — DIPHENHYDRAMINE HYDROCHLORIDE 50 MG/ML
INJECTION INTRAMUSCULAR; INTRAVENOUS
Status: DISPENSED
Start: 2018-07-14 | End: 2018-07-14

## 2018-07-14 RX ADMIN — DIPHENHYDRAMINE HYDROCHLORIDE 25 MG: 50 INJECTION INTRAMUSCULAR; INTRAVENOUS at 05:52

## 2018-07-14 RX ADMIN — METOCLOPRAMIDE 10 MG: 5 INJECTION, SOLUTION INTRAMUSCULAR; INTRAVENOUS at 05:51

## 2018-07-14 RX ADMIN — DEXAMETHASONE SODIUM PHOSPHATE 10 MG: 4 INJECTION, SOLUTION INTRA-ARTICULAR; INTRALESIONAL; INTRAMUSCULAR; INTRAVENOUS; SOFT TISSUE at 06:29

## 2018-07-14 RX ADMIN — SODIUM CHLORIDE 1000 ML: 9 INJECTION, SOLUTION INTRAVENOUS at 05:51

## 2018-07-14 NOTE — ED PROVIDER NOTES
Pt Name: Nathan Walters  MRN: [id number]  [place] 1965  Age/Sex: 47 y o  female  Date of evaluation: 7/14/2018  PCP: Ismael Fernandes MD    [address] [place] [address]    Chief Complaint   Patient presents with    Headache     "This morning I got a headache  And now it's worse " Report tylenol every four hours, also reports photophobia  HPI    47 y o  female presenting with headache  Patient states the headache began as a mild headache this morning but has grown gradually worse despite Tylenol which she has taken 650 mg of every 4 hours  She complains of severe throbbing pain in the center of the head and radiating throughout her entire head, worse with movement or bright lights, better at rest   She notes some nausea with the pain but denies vomiting, changes in vision or speech, trauma, previous headaches this bad  HPI      Past Medical and Surgical History    Past Medical History:   Diagnosis Date    Anemia     Intraductal hyperplasia without atypia of right breast 02/2016    c florid and sclerosing adenosis, background dense stromal fibrosis    Iron deficiency anemia due to chronic blood loss     transfused 2015 9/17: ferritin 2   H/H 8 8/31 7%    Menorrhagia with regular cycle     Varicella     without complication       Past Surgical History:   Procedure Laterality Date    BREAST BIOPSY Right 02/2016    intraductal hyperplasia without atypia,fibroid and sclerosing adenosis, and columnar cell change in a bsckround of dense stromal fibrosis no malignancy identified    TUBAL LIGATION         Family History   Problem Relation Age of Onset   [facility] Breast cancer Mother         52's passed 78 yo c mets    Anemia Mother     Thyroid disease Mother         total thyroidectomy uncertain reason    Hyperlipidemia Father     Stroke Family     Deep vein thrombosis Neg Hx     Mental illness Neg Hx     Hypertension Neg Hx     Coronary artery disease Neg Hx     Diabetes type II Neg Hx        Social History Substance Use Topics    Smoking status: Never Smoker    Smokeless tobacco: Never Used    Alcohol use No      Comment: rare           Allergies    No Known Allergies    Home Medications    Prior to Admission medications    Medication Sig Start Date End Date Taking? Authorizing Provider   ferrous sulfate 325 (65 Fe) mg tablet Take 325 mg by mouth 5/22/18 7/21/18  Historical Provider, MD   ibuprofen (MOTRIN) 200 mg tablet Take 500 mg by mouth daily    Historical Provider, MD   norethindrone (AYGESTIN) 5 mg tablet Take 1 tab orally each day fom 1st-14th each month 2/13/18   Chino Whittington MD           Review of Systems    Review of Systems   Constitutional: Negative for activity change, chills and fever  HENT: Negative for drooling and facial swelling  Eyes: Negative for pain, discharge and visual disturbance  Respiratory: Negative for apnea, cough, chest tightness, shortness of breath and wheezing  Cardiovascular: Negative for chest pain and leg swelling  Gastrointestinal: Negative for abdominal pain, constipation, diarrhea, nausea and vomiting  Genitourinary: Negative for difficulty urinating, dysuria and urgency  Musculoskeletal: Negative for arthralgias, back pain and gait problem  Skin: Negative for color change and rash  Neurological: Positive for headaches  Negative for dizziness, speech difficulty and weakness  Psychiatric/Behavioral: Negative for agitation, behavioral problems and confusion  All other systems reviewed and negative  Physical Exam      ED Triage Vitals [07/14/18 0525]   Temperature Pulse Respirations Blood Pressure SpO2   98 1 °F (36 7 °C) 98 18 115/51 99 %      Temp Source Heart Rate Source Patient Position - Orthostatic VS BP Location FiO2 (%)   Temporal Monitor Sitting Left arm --      Pain Score       --               Physical Exam   Constitutional: She is oriented to person, place, and time  She appears well-developed and well-nourished     HENT: Head: Normocephalic and atraumatic  Nose: Nose normal    Mouth/Throat: Oropharynx is clear and moist    Eyes: Conjunctivae and EOM are normal  Pupils are equal, round, and reactive to light  Neck: Normal range of motion  Neck supple  Cardiovascular: Normal rate, regular rhythm, normal heart sounds and intact distal pulses  Pulmonary/Chest: Effort normal and breath sounds normal  No respiratory distress  She has no wheezes  She has no rales  Abdominal: Soft  She exhibits no distension  There is no tenderness  There is no rebound and no guarding  Musculoskeletal: Normal range of motion  She exhibits no edema or deformity  Neurological: She is alert and oriented to person, place, and time  No cranial nerve deficit  She exhibits normal muscle tone  Coordination normal    CN 2 through 12 intact, 5/5 strength in all extremities, normal coordination   Skin: Skin is warm and dry  No rash noted  No erythema  Psychiatric: She has a normal mood and affect   Her behavior is normal  Judgment and thought content normal             Diagnostic Results      Labs:    Results for orders placed or performed during the hospital encounter of 07/14/18   CBC and differential   Result Value Ref Range    WBC 11 70 (H) 4 50 - 11 00 Thousand/uL    RBC 4 76 4 00 - 5 20 Million/uL    Hemoglobin 12 8 12 0 - 16 0 g/dL    Hematocrit 39 5 36 0 - 46 0 %    MCV 83 80 - 100 fL    MCH 26 9 26 0 - 34 0 pg    MCHC 32 5 31 0 - 36 0 g/dL    RDW 26 8 (H) <15 3 %    MPV 9 1 8 9 - 12 7 fL    Platelets 492 299 - 709 Thousands/uL    Neutrophils Relative 69 (H) 45 - 65 %    Lymphocytes Relative 19 (L) 20 - 50 %    Monocytes Relative 8 1 - 10 %    Eosinophils Relative 3 0 - 6 %    Basophils Relative 1 0 - 1 %    Neutrophils Absolute 8 00 (H) 1 80 - 7 80 Thousands/µL    Lymphocytes Absolute 2 20 0 50 - 4 00 Thousands/µL    Monocytes Absolute 1 00 (H) 0 20 - 0 90 Thousand/µL    Eosinophils Absolute 0 40 0 00 - 0 40 Thousand/µL    Basophils Absolute 0  10 0 00 - 0 10 Thousands/µL       All labs reviewed and utilized in the medical decision making process    Radiology:    No orders to display       All radiology studies independently viewed by me and interpreted by the radiologist     Procedure    Procedures    CritCare Time      ED Course of Care and Re-Assessments      Symptoms significantly improved with fluids, Benadryl, Reglan  Medications   sodium chloride 0 9 % bolus 1,000 mL (1,000 mL Intravenous New Bag 7/14/18 0551)   dexamethasone (PF) (DECADRON) injection 10 mg (not administered)   diphenhydrAMINE (BENADRYL) injection 25 mg (25 mg Intravenous Given 7/14/18 0552)   metoclopramide (REGLAN) injection 10 mg (10 mg Intravenous Given 7/14/18 0551)           FINAL IMPRESSION    Final diagnoses:   Headache         DISPOSITION/PLAN    50-year-old female with history and symptoms above  Vital signs reassuring, examination also reassuring with normal neurologic exam   Symptoms much improved with conservative treatment as above  Based on gradual onset normal neurologic exam, do not suspect subarachnoid hemorrhage, sepsis, meningitis, mass lesion, other life threat  Discharged with strict return precautions, follow up with primary care doctor  Given short course of p  o  Benadryl and Reglan at patient's request   Time reflects when diagnosis was documented in both MDM as applicable and the Disposition within this note     Time User Action Codes Description Comment    7/14/2018  6:20 AM Minus Boys Add [R51] Headache       ED Disposition     ED Disposition Condition Comment    Discharge  Harshil Curet discharge to home/self care      Condition at discharge: Good        Follow-up Information     Follow up With Specialties Details Why Contact Info    María Acuña MD Family Medicine Call in 2 days To discuss your headaches and further treatment and follow-up as needed [address]    [address] REFERRED TO:    Klaus Ahn MD  [address] [facility] [address]  [address]  [phone]    Call in 2 days  To discuss your headaches and further treatment and follow-up as needed      DISCHARGE MEDICATIONS:    Patient's Medications   Discharge Prescriptions    DIPHENHYDRAMINE (BENADRYL) 25 MG CAPSULE    Take 1 capsule (25 mg total) by mouth every 6 (six) hours as needed for itching       Start Date: 7/14/2018 End Date: --       Order Dose: 25 mg       Quantity: 20 capsule    Refills: 0    METOCLOPRAMIDE (REGLAN) 10 MG TABLET    Take 1 tablet (10 mg total) by mouth every 6 (six) hours       Start Date: 7/14/2018 End Date: --       Order Dose: 10 mg       Quantity: 30 tablet    Refills: 0       No discharge procedures on file           MD Cecilia Hackett MD  07/14/18 8761

## 2018-07-14 NOTE — DISCHARGE INSTRUCTIONS
Acute Headache, Ambulatory Care   GENERAL INFORMATION:   An acute headache  is pain or discomfort that starts suddenly and gets worse quickly  The cause of an acute headache may not be known  It may be triggered by stress, fatigue, hormones, food, or trauma  Common related symptoms include the following:   · Fever    · Sinus pressure    · Loss of memory    · Nausea or vomiting    · Problems with your vision, such as watery or red eyes, loss of vision, or pain in bright light    · Stiff neck    · Tenderness of the head and neck area    · Trouble staying awake, or being less alert than usual     · Weakness or less energy  Seek immediate care for the following symptoms:   · Severe pain    · A headache that occurs after a blow to the head, a fall, or other trauma     · Confusion or forgetfulness    · Numbness on one side of your face or body  Treatment for an acute headache  may include medicine to decrease pain  You may also need biofeedback or cognitive behavioral therapy  Ask your healthcare provider about these and other treatments for an acute headache  Manage my symptoms:   · Apply heat  on your head for 20 to 30 minutes every 2 hours for as many days as directed  Heat helps decrease pain and muscle spasms  You may alternate heat and ice  · Apply ice  on your head for 15 to 20 minutes every hour or as directed  Use an ice pack, or put crushed ice in a plastic bag  Cover it with a towel  Ice helps decrease pain  · Relax your muscles  Lie down in a comfortable position and close your eyes  Relax your muscles slowly  Start at your toes and work your way up your body  · Keep a record of your headaches  Write down when your headaches start and stop  Include your symptoms and what you were doing when the headache began  Record what you ate or drank for 24 hours before the headache started  Describe the pain and where it hurts  Keep track of what you did to treat your headache and whether it worked    Follow up with your healthcare provider as directed:  Bring your headache record with you when you see your healthcare provider  Write down your questions so you remember to ask them during your visits  CARE AGREEMENT:   You have the right to help plan your care  Learn about your health condition and how it may be treated  Discuss treatment options with your caregivers to decide what care you want to receive  You always have the right to refuse treatment  The above information is an  only  It is not intended as medical advice for individual conditions or treatments  Talk to your doctor, nurse or pharmacist before following any medical regimen to see if it is safe and effective for you  © 2014 [address] is for End User's use only and may not be sold, redistributed or otherwise used for commercial purposes  All illustrations and images included in CareNotes® are the copyrighted property of A D A M [employer]  or [employer]

## 2018-08-06 ENCOUNTER — TRANSCRIBE ORDERS (OUTPATIENT)
Dept: ADMINISTRATIVE | Age: 53
End: 2018-08-06

## 2018-08-06 ENCOUNTER — APPOINTMENT (EMERGENCY)
Dept: RADIOLOGY | Facility: HOSPITAL | Age: 53
End: 2018-08-06
Payer: OTHER MISCELLANEOUS

## 2018-08-06 ENCOUNTER — APPOINTMENT (OUTPATIENT)
Dept: URGENT CARE | Age: 53
End: 2018-08-06
Payer: OTHER MISCELLANEOUS

## 2018-08-06 ENCOUNTER — HOSPITAL ENCOUNTER (EMERGENCY)
Facility: HOSPITAL | Age: 53
Discharge: HOME | End: 2018-08-06
Attending: EMERGENCY MEDICINE | Admitting: EMERGENCY MEDICINE
Payer: OTHER MISCELLANEOUS

## 2018-08-06 VITALS
WEIGHT: 146.61 LBS | BODY MASS INDEX: 28.78 KG/M2 | DIASTOLIC BLOOD PRESSURE: 83 MMHG | RESPIRATION RATE: 18 BRPM | OXYGEN SATURATION: 99 % | TEMPERATURE: 97.8 F | SYSTOLIC BLOOD PRESSURE: 135 MMHG | HEART RATE: 90 BPM | HEIGHT: 60 IN

## 2018-08-06 DIAGNOSIS — M25.562 LEFT KNEE PAIN: Primary | ICD-10-CM

## 2018-08-06 PROCEDURE — 99213 OFFICE O/P EST LOW 20 MIN: CPT | Performed by: PREVENTIVE MEDICINE

## 2018-08-06 PROCEDURE — 73564 X-RAY EXAM KNEE 4 OR MORE: CPT

## 2018-08-06 PROCEDURE — 99283 EMERGENCY DEPT VISIT LOW MDM: CPT

## 2018-08-06 RX ORDER — IBUPROFEN 600 MG/1
600 TABLET ORAL ONCE
Status: COMPLETED | OUTPATIENT
Start: 2018-08-06 | End: 2018-08-06

## 2018-08-06 RX ORDER — NAPROXEN 375 MG/1
375 TABLET ORAL 2 TIMES DAILY WITH MEALS
Qty: 20 TABLET | Refills: 0 | Status: SHIPPED | OUTPATIENT
Start: 2018-08-06 | End: 2018-09-10 | Stop reason: SDUPTHER

## 2018-08-06 RX ORDER — IBUPROFEN 600 MG/1
TABLET ORAL
Status: DISCONTINUED
Start: 2018-08-06 | End: 2018-08-06 | Stop reason: HOSPADM

## 2018-08-06 RX ORDER — NAPROXEN 375 MG/1
375 TABLET ORAL 2 TIMES DAILY WITH MEALS
Qty: 20 TABLET | Refills: 0 | Status: SHIPPED | OUTPATIENT
Start: 2018-08-06 | End: 2018-10-18

## 2018-08-06 RX ADMIN — IBUPROFEN 600 MG: 600 TABLET ORAL at 01:57

## 2018-08-06 NOTE — ED PROVIDER NOTES
Pt Name: Stacey Otero  MRN: [id number]  [place] 1965  Age/Sex: 47 y o  female  Date of evaluation: 8/6/2018  PCP: Karis Gomez MD    CHIEF COMPLAINT    Chief Complaint   Patient presents with    Knee Injury     pt  reports falling down at her job and hurting left knee   ice applied immed  HPI    47 y o  female presenting with left knee pain  Patient states that she was walking between some buildings and stepped off a curb wrong, losing her balance and falling and striking the left knee on the concrete  She complains of moderate to severe dull pain on the front of the left knee, worse with moving the knee, better at rest, nonradiating  Patient also complains of mild pain in the right shoulder and a brief tingling in the fingers that has resolved  She denies striking her head, loss of consciousness, focal numbness or weakness, vomiting, chest pain, abdominal pain  HPI      Past Medical and Surgical History    Past Medical History:   Diagnosis Date    Anemia     Intraductal hyperplasia without atypia of right breast 02/2016    c florid and sclerosing adenosis, background dense stromal fibrosis    Iron deficiency anemia due to chronic blood loss     transfused 2015 9/17: ferritin 2   H/H 8 8/31 7%    Menorrhagia with regular cycle     Varicella     without complication       Past Surgical History:   Procedure Laterality Date    BREAST BIOPSY Right 02/2016    intraductal hyperplasia without atypia,fibroid and sclerosing adenosis, and columnar cell change in a bsckround of dense stromal fibrosis no malignancy identified    TUBAL LIGATION         Family History   Problem Relation Age of Onset   [facility] Breast cancer Mother         52's passed 78 yo c mets    Anemia Mother     Thyroid disease Mother         total thyroidectomy uncertain reason    Hyperlipidemia Father     Stroke Family     Deep vein thrombosis Neg Hx     Mental illness Neg Hx     Hypertension Neg Hx     Coronary artery disease Neg Hx     Diabetes type II Neg Hx        Social History   Substance Use Topics    Smoking status: Never Smoker    Smokeless tobacco: Never Used    Alcohol use No      Comment: rare           Allergies    No Known Allergies    Home Medications    Prior to Admission medications    Medication Sig Start Date End Date Taking? Authorizing Provider   diphenhydrAMINE (BENADRYL) 25 mg capsule Take 1 capsule (25 mg total) by mouth every 6 (six) hours as needed for itching 7/14/18   Ashish Hoff MD   ferrous sulfate 325 (65 Fe) mg tablet Take 325 mg by mouth 5/22/18 7/21/18  Historical Provider, MD   ibuprofen (MOTRIN) 200 mg tablet Take 500 mg by mouth daily    Historical Provider, MD   metoclopramide (REGLAN) 10 mg tablet Take 1 tablet (10 mg total) by mouth every 6 (six) hours 7/14/18   Ashish Hoff MD   norethindrone (AYGESTIN) 5 mg tablet Take 1 tab orally each day fom 1st-14th each month 2/13/18   Sandip Mchugh MD           Review of Systems    Review of Systems   Constitutional: Negative for activity change, chills and fever  HENT: Negative for drooling and facial swelling  Eyes: Negative for pain, discharge and visual disturbance  Respiratory: Negative for apnea, cough, chest tightness, shortness of breath and wheezing  Cardiovascular: Negative for chest pain and leg swelling  Gastrointestinal: Negative for abdominal pain, constipation, diarrhea, nausea and vomiting  Genitourinary: Negative for difficulty urinating, dysuria and urgency  Musculoskeletal: Positive for joint swelling  Negative for arthralgias, back pain and gait problem  Skin: Negative for color change and rash  Neurological: Positive for numbness  Negative for dizziness, speech difficulty, weakness and headaches  Psychiatric/Behavioral: Negative for agitation, behavioral problems and confusion  All other systems reviewed and negative      Physical Exam      ED Triage Vitals [08/06/18 0100] Temperature Pulse Respirations Blood Pressure SpO2   97 8 °F (36 6 °C) 90 18 135/83 99 %      Temp Source Heart Rate Source Patient Position - Orthostatic VS BP Location FiO2 (%)   Temporal -- Sitting Left arm --      Pain Score       Worst Possible Pain               Physical Exam   Constitutional: She is oriented to person, place, and time  She appears well-developed and well-nourished  HENT:   Head: Normocephalic and atraumatic  Eyes: Conjunctivae and EOM are normal  Pupils are equal, round, and reactive to light  Neck: Normal range of motion  Neck supple  Cardiovascular: Normal rate, regular rhythm, normal heart sounds and intact distal pulses  Pulmonary/Chest: Effort normal and breath sounds normal  No respiratory distress  She has no wheezes  She has no rales  Abdominal: Soft  She exhibits no distension  There is no tenderness  There is no rebound and no guarding  Musculoskeletal: Normal range of motion  She exhibits tenderness  She exhibits no edema or deformity  Small erythematous area of the left patella and tender to palpation in that area  Full passive range of motion, strength and pulses intact distal to the knee, no pain or laxity with anterior drawer, varus or valgus stress, no tenderness to palpation anywhere on the knee other than on the patella  Right shoulder nontender to palpation, mild pain reproduced with abducting past approximately 90 °  Normal strength in both hands and arms, symmetric  Pulse, sensation, strength intact in right arm and left arm  Neurological: She is alert and oriented to person, place, and time  Skin: Skin is warm and dry  No rash noted  No erythema  Psychiatric: She has a normal mood and affect   Her behavior is normal  Judgment and thought content normal             Diagnostic Results      Labs:    Results for orders placed or performed during the hospital encounter of 07/14/18   CBC and differential   Result Value Ref Range    WBC 11 70 (H) 4 50 - 11 00 Thousand/uL    RBC 4 76 4 00 - 5 20 Million/uL    Hemoglobin 12 8 12 0 - 16 0 g/dL    Hematocrit 39 5 36 0 - 46 0 %    MCV 83 80 - 100 fL    MCH 26 9 26 0 - 34 0 pg    MCHC 32 5 31 0 - 36 0 g/dL    RDW 26 8 (H) <15 3 %    MPV 9 1 8 9 - 12 7 fL    Platelets 075 193 - 865 Thousands/uL    Neutrophils Relative 69 (H) 45 - 65 %    Lymphocytes Relative 19 (L) 20 - 50 %    Monocytes Relative 8 1 - 10 %    Eosinophils Relative 3 0 - 6 %    Basophils Relative 1 0 - 1 %    Neutrophils Absolute 8 00 (H) 1 80 - 7 80 Thousands/µL    Lymphocytes Absolute 2 20 0 50 - 4 00 Thousands/µL    Monocytes Absolute 1 00 (H) 0 20 - 0 90 Thousand/µL    Eosinophils Absolute 0 40 0 00 - 0 40 Thousand/µL    Basophils Absolute 0 10 0 00 - 0 10 Thousands/µL   Smear Review(Phlebs Do Not Order)   Result Value Ref Range    RBC Morphology Normal     Platelet Estimate Adequate Adequate       All labs reviewed and utilized in the medical decision making process    Radiology:    XR knee 4+ views left injury   ED Interpretation   No acute osseous abnormality          All radiology studies independently viewed by me and interpreted by the radiologist     Procedure    Procedures    CritCare Time      ED Course of Care and Re-Assessments      Given ibuprofen and x-ray ordered based on tenderness palpation of the patella    Medications   ibuprofen (MOTRIN) tablet 600 mg (600 mg Oral Given 8/6/18 0157)           FINAL IMPRESSION    Final diagnoses:   Left knee pain         DISPOSITION/PLAN    76-year-old female with knee pain as above vital signs and examination reassuring with knee stable and plain films negative  Do not suspect compartment syndrome, fracture, dislocation, severe internal derangement, other threat to life or limb at this time  Arm findings unimpressive at this time, do not suspect severe injury to shoulder, brachial plexus, arm  Discharged strict return precautions, follow up primary care doctor    Time reflects when diagnosis was documented in both MDM as applicable and the Disposition within this note     Time User Action Codes Description Comment    8/6/2018  1:55 AM Darlyn Simpson Add [M25 102] Left knee pain       ED Disposition     ED Disposition Condition Comment    Discharge  Williamroseline Curet discharge to home/self care  Condition at discharge: Good        Follow-up Information     Follow up With Specialties Details Why Contact Info    Precious Jacinto MD Family Medicine Call in 1 day As needed [address] [place] [address]  [address] [facility]              PATIENT REFERRED TO:    Precious Jacinto MD  [address]  [address]  [phone]    Call in 1 day  As needed      DISCHARGE MEDICATIONS:    Patient's Medications   Discharge Prescriptions    NAPROXEN (NAPROSYN) 375 MG TABLET    Take 1 tablet (375 mg total) by mouth 2 (two) times a day with meals       Start Date: 8/6/2018  End Date: --       Order Dose: 375 mg       Quantity: 20 tablet    Refills: 0       No discharge procedures on file           MD Kim Vu MD  08/06/18 0668

## 2018-08-06 NOTE — DISCHARGE INSTRUCTIONS

## 2018-08-07 ENCOUNTER — OFFICE VISIT (OUTPATIENT)
Dept: GASTROENTEROLOGY | Facility: MEDICAL CENTER | Age: 53
End: 2018-08-07
Payer: COMMERCIAL

## 2018-08-07 VITALS
BODY MASS INDEX: 28.12 KG/M2 | DIASTOLIC BLOOD PRESSURE: 76 MMHG | TEMPERATURE: 97.6 F | WEIGHT: 144 LBS | HEART RATE: 102 BPM | SYSTOLIC BLOOD PRESSURE: 114 MMHG

## 2018-08-07 DIAGNOSIS — Z12.11 ENCOUNTER FOR SCREENING COLONOSCOPY: ICD-10-CM

## 2018-08-07 DIAGNOSIS — D50.0 IRON DEFICIENCY ANEMIA DUE TO CHRONIC BLOOD LOSS: Primary | ICD-10-CM

## 2018-08-07 PROCEDURE — 99244 OFF/OP CNSLTJ NEW/EST MOD 40: CPT | Performed by: INTERNAL MEDICINE

## 2018-08-07 NOTE — LETTER
August 7, 2018     Cat Hare MD  [address]  [facility] [address]  [address] [facility]    Patient: Rossana Galvan   YOB: 1965   Date of Visit: 8/7/2018       Dear Dr Opal Guerra: Thank you for referring Harshil Sanderson to me for evaluation  Below are my notes for this consultation  If you have questions, please do not hesitate to call me  I look forward to following your patient along with you  Sincerely,        Deuce Lee DO        CC: Juventino Naegeli, DO Cala Rubinstein, MD  8/7/2018  4:58 PM  Cosign Needed  [facility] - Outpatient Consultation  Caleb Sanderson 47 y o  female MRN: [id number]  Encounter: [id number]          ASSESSMENT AND PLAN:      1  Iron deficiency anemia due to chronic blood loss 2  Encounter for screening colonoscopy   - presumably from gyn source, however is due for screening colonoscopy  - will schedule  Colonoscopy to rule out potential GI source of bleeding although no reported symptoms or signs of GI bleeding as well as screening for colon cancer   - lasr hemoglobin 12 8, has been responding to IV iron infusions under the care of Dr Hailey Davis  - Case request operating room: COLONOSCOPY; Standing  - Case request operating room: COLONOSCOPY      ______________________________________________________________________    HPI:   This is a 26-year-old female seen in consultation for iron deficiency anemia as well as screening colonoscopy  Patient has a history of fibroids which is presumably the source of her iron deficiency anemia and is under the care of gyn and is on hormonal supplementation to help with this  She was previously on oral iron supplementation however this was not effective in so was transitioned to IV iron under the care of Dr Hailey Davis  Last hemoglobin 12 8 07/2018 with improvement in iron panel  Patient  Currently denies any GI symptoms  Had been complaining of constipation when on iron supplementation but this is improving  Denies blood in the stool no changes in bowel habits  Denies abdominal pain or nausea or vomiting  Denies unintentional weight loss  Denies family history of GI disorders including colon cancer  REVIEW OF SYSTEMS:    CONSTITUTIONAL: Denies any fever, chills, rigors, and weight loss  HEENT: No earache or tinnitus  Denies hearing loss or visual disturbances  CARDIOVASCULAR: No chest pain or palpitations  RESPIRATORY: Denies any cough, hemoptysis, shortness of breath or dyspnea on exertion  GASTROINTESTINAL: As noted in the History of Present Illness  GENITOURINARY: No problems with urination  Denies any hematuria or dysuria  NEUROLOGIC: No dizziness or vertigo, denies headaches  MUSCULOSKELETAL: Denies any muscle or joint pain  SKIN: Denies skin rashes or itching  ENDOCRINE: Denies excessive thirst  Denies intolerance to heat or cold  PSYCHOSOCIAL: Denies depression or anxiety  Denies any recent memory loss  Historical Information   Past Medical History:   Diagnosis Date    Anemia     Intraductal hyperplasia without atypia of right breast 02/2016    c florid and sclerosing adenosis, background dense stromal fibrosis    Iron deficiency anemia due to chronic blood loss     transfused 2015 9/17: ferritin 2   H/H 8 8/31 7%    Menorrhagia with regular cycle     Varicella     without complication     Past Surgical History:   Procedure Laterality Date    BREAST BIOPSY Right 02/2016    intraductal hyperplasia without atypia,fibroid and sclerosing adenosis, and columnar cell change in a bsckround of dense stromal fibrosis no malignancy identified    TUBAL LIGATION       Social History   History   Alcohol Use No     Comment: rare     History   Drug Use No     History   Smoking Status    Never Smoker   Smokeless Tobacco    Never Used     Family History   Problem Relation Age of Onset   [facility] Breast cancer Mother         52's passed 78 yo c mets    Anemia Mother     Thyroid disease Mother total thyroidectomy uncertain reason    Hyperlipidemia Father     Stroke Family     Deep vein thrombosis Neg Hx     Mental illness Neg Hx     Hypertension Neg Hx     Coronary artery disease Neg Hx     Diabetes type II Neg Hx        Meds/Allergies       Current Outpatient Prescriptions:     diphenhydrAMINE (BENADRYL) 25 mg capsule    norethindrone (AYGESTIN) 5 mg tablet    naproxen (NAPROSYN) 375 mg tablet    naproxen (NAPROSYN) 375 mg tablet    No Known Allergies        Objective     Blood pressure 114/76, pulse 102, temperature 97 6 °F (36 4 °C), temperature source Tympanic, weight 65 3 kg (144 lb), last menstrual period 07/27/2018, not currently breastfeeding  Body mass index is 28 12 kg/m²  PHYSICAL EXAM:      General Appearance:   Alert, cooperative, no distress   HEENT:   Normocephalic, atraumatic, anicteric      Neck:  Supple, symmetrical, trachea midline   Lungs:   Clear to auscultation bilaterally; no rales, rhonchi or wheezing; respirations unlabored    Heart[de-identified]   Regular rate and rhythm; no murmur, rub, or gallop  Abdomen:   Soft, non-tender, non-distended; normal bowel sounds; no masses, no organomegaly    Genitalia:   Deferred    Rectal:   Deferred    Extremities:  No cyanosis, clubbing or edema    Pulses:  2+ and symmetric    Skin:  No jaundice, rashes, or lesions    Lymph nodes:  No palpable cervical lymphadenopathy        Lab Results:   No visits with results within 1 Day(s) from this visit     Latest known visit with results is:   Admission on 07/14/2018, Discharged on 07/14/2018   Component Date Value    WBC 07/14/2018 11 70*    RBC 07/14/2018 4 76     Hemoglobin 07/14/2018 12 8     Hematocrit 07/14/2018 39 5     MCV 07/14/2018 83     MCH 07/14/2018 26 9     MCHC 07/14/2018 32 5     RDW 07/14/2018 26 8*    MPV 07/14/2018 9 1     Platelets 63/37/2635 266     Neutrophils Relative 07/14/2018 69*    Lymphocytes Relative 07/14/2018 19*    Monocytes Relative 07/14/2018 8  Eosinophils Relative 07/14/2018 3     Basophils Relative 07/14/2018 1     Neutrophils Absolute 07/14/2018 8 00*    Lymphocytes Absolute 07/14/2018 2 20     Monocytes Absolute 07/14/2018 1 00*    Eosinophils Absolute 07/14/2018 0 40     Basophils Absolute 07/14/2018 0 10     RBC Morphology 07/14/2018 Normal     Platelet Estimate 24/40/4626 Adequate

## 2018-08-07 NOTE — PROGRESS NOTES
[facility] - Outpatient Consultation  Ismael Diane 47 y o  female MRN: [id number]  Encounter: [id number]          ASSESSMENT AND PLAN:      1  Iron deficiency anemia due to chronic blood loss 2  Encounter for screening colonoscopy   - presumably from gyn source, however is due for screening colonoscopy  - will schedule  Colonoscopy to rule out potential GI source of bleeding although no reported symptoms or signs of GI bleeding as well as screening for colon cancer   - lasr hemoglobin 12 8, has been responding to IV iron infusions under the care of Dr Jackie Herrera  - Case request operating room: COLONOSCOPY; Standing  - Case request operating room: COLONOSCOPY      ______________________________________________________________________    HPI:   This is a 56-year-old female seen in consultation for iron deficiency anemia as well as screening colonoscopy  Patient has a history of fibroids which is presumably the source of her iron deficiency anemia and is under the care of gyn and is on hormonal supplementation to help with this  She was previously on oral iron supplementation however this was not effective in so was transitioned to IV iron under the care of Dr Jackie Herrera  Last hemoglobin 12 8 07/2018 with improvement in iron panel  Patient  Currently denies any GI symptoms  Had been complaining of constipation when on iron supplementation but this is improving  Denies blood in the stool no changes in bowel habits  Denies abdominal pain or nausea or vomiting  Denies unintentional weight loss  Denies family history of GI disorders including colon cancer  REVIEW OF SYSTEMS:    CONSTITUTIONAL: Denies any fever, chills, rigors, and weight loss  HEENT: No earache or tinnitus  Denies hearing loss or visual disturbances  CARDIOVASCULAR: No chest pain or palpitations  RESPIRATORY: Denies any cough, hemoptysis, shortness of breath or dyspnea on exertion    GASTROINTESTINAL: As noted in the History of Present Illness  GENITOURINARY: No problems with urination  Denies any hematuria or dysuria  NEUROLOGIC: No dizziness or vertigo, denies headaches  MUSCULOSKELETAL: Denies any muscle or joint pain  SKIN: Denies skin rashes or itching  ENDOCRINE: Denies excessive thirst  Denies intolerance to heat or cold  PSYCHOSOCIAL: Denies depression or anxiety  Denies any recent memory loss  Historical Information   Past Medical History:   Diagnosis Date    Anemia     Intraductal hyperplasia without atypia of right breast 02/2016    c florid and sclerosing adenosis, background dense stromal fibrosis    Iron deficiency anemia due to chronic blood loss     transfused 2015 9/17: ferritin 2   H/H 8 8/31 7%    Menorrhagia with regular cycle     Varicella     without complication     Past Surgical History:   Procedure Laterality Date    BREAST BIOPSY Right 02/2016    intraductal hyperplasia without atypia,fibroid and sclerosing adenosis, and columnar cell change in a bsckround of dense stromal fibrosis no malignancy identified    TUBAL LIGATION       Social History   History   Alcohol Use No     Comment: rare     History   Drug Use No     History   Smoking Status    Never Smoker   Smokeless Tobacco    Never Used     Family History   Problem Relation Age of Onset   [facility] Breast cancer Mother         52's passed 78 yo c mets    Anemia Mother     Thyroid disease Mother         total thyroidectomy uncertain reason    Hyperlipidemia Father     Stroke Family     Deep vein thrombosis Neg Hx     Mental illness Neg Hx     Hypertension Neg Hx     Coronary artery disease Neg Hx     Diabetes type II Neg Hx        Meds/Allergies       Current Outpatient Prescriptions:     diphenhydrAMINE (BENADRYL) 25 mg capsule    norethindrone (AYGESTIN) 5 mg tablet    naproxen (NAPROSYN) 375 mg tablet    naproxen (NAPROSYN) 375 mg tablet    No Known Allergies        Objective     Blood pressure 114/76, pulse 102, temperature 97 6 °F (36 4 °C), temperature source Tympanic, weight 65 3 kg (144 lb), last menstrual period 07/27/2018, not currently breastfeeding  Body mass index is 28 12 kg/m²  PHYSICAL EXAM:      General Appearance:   Alert, cooperative, no distress   HEENT:   Normocephalic, atraumatic, anicteric      Neck:  Supple, symmetrical, trachea midline   Lungs:   Clear to auscultation bilaterally; no rales, rhonchi or wheezing; respirations unlabored    Heart[de-identified]   Regular rate and rhythm; no murmur, rub, or gallop  Abdomen:   Soft, non-tender, non-distended; normal bowel sounds; no masses, no organomegaly    Genitalia:   Deferred    Rectal:   Deferred    Extremities:  No cyanosis, clubbing or edema    Pulses:  2+ and symmetric    Skin:  No jaundice, rashes, or lesions    Lymph nodes:  No palpable cervical lymphadenopathy        Lab Results:   No visits with results within 1 Day(s) from this visit     Latest known visit with results is:   Admission on 07/14/2018, Discharged on 07/14/2018   Component Date Value    WBC 07/14/2018 11 70*    RBC 07/14/2018 4 76     Hemoglobin 07/14/2018 12 8     Hematocrit 07/14/2018 39 5     MCV 07/14/2018 83     MCH 07/14/2018 26 9     MCHC 07/14/2018 32 5     RDW 07/14/2018 26 8*    MPV 07/14/2018 9 1     Platelets 27/58/2189 266     Neutrophils Relative 07/14/2018 69*    Lymphocytes Relative 07/14/2018 19*    Monocytes Relative 07/14/2018 8     Eosinophils Relative 07/14/2018 3     Basophils Relative 07/14/2018 1     Neutrophils Absolute 07/14/2018 8 00*    Lymphocytes Absolute 07/14/2018 2 20     Monocytes Absolute 07/14/2018 1 00*    Eosinophils Absolute 07/14/2018 0 40     Basophils Absolute 07/14/2018 0 10     RBC Morphology 07/14/2018 Normal     Platelet Estimate 22/21/7613 Adequate

## 2018-08-08 ENCOUNTER — APPOINTMENT (OUTPATIENT)
Dept: URGENT CARE | Age: 53
End: 2018-08-08

## 2018-08-13 ENCOUNTER — TRANSCRIBE ORDERS (OUTPATIENT)
Dept: LAB | Facility: CLINIC | Age: 53
End: 2018-08-13

## 2018-08-13 ENCOUNTER — APPOINTMENT (OUTPATIENT)
Dept: LAB | Facility: HOSPITAL | Age: 53
End: 2018-08-13
Payer: COMMERCIAL

## 2018-08-13 LAB
BASOPHILS # BLD AUTO: 0.09 THOUSANDS/ΜL (ref 0–0.1)
BASOPHILS NFR BLD AUTO: 1 % (ref 0–1)
EOSINOPHIL # BLD AUTO: 0.18 THOUSAND/ΜL (ref 0–0.61)
EOSINOPHIL NFR BLD AUTO: 1 % (ref 0–6)
ERYTHROCYTE [DISTWIDTH] IN BLOOD BY AUTOMATED COUNT: 17.4 % (ref 11.6–15.1)
FERRITIN SERPL-MCNC: 89 NG/ML (ref 8–388)
HCT VFR BLD AUTO: 43.3 % (ref 34.8–46.1)
HGB BLD-MCNC: 13.6 G/DL (ref 11.5–15.4)
IMM GRANULOCYTES # BLD AUTO: 0.06 THOUSAND/UL (ref 0–0.2)
IMM GRANULOCYTES NFR BLD AUTO: 1 % (ref 0–2)
IRON SATN MFR SERPL: 19 %
IRON SERPL-MCNC: 51 UG/DL (ref 50–170)
LYMPHOCYTES # BLD AUTO: 3.18 THOUSANDS/ΜL (ref 0.6–4.47)
LYMPHOCYTES NFR BLD AUTO: 25 % (ref 14–44)
MCH RBC QN AUTO: 28.2 PG (ref 26.8–34.3)
MCHC RBC AUTO-ENTMCNC: 31.4 G/DL (ref 31.4–37.4)
MCV RBC AUTO: 90 FL (ref 82–98)
MONOCYTES # BLD AUTO: 0.81 THOUSAND/ΜL (ref 0.17–1.22)
MONOCYTES NFR BLD AUTO: 6 % (ref 4–12)
NEUTROPHILS # BLD AUTO: 8.31 THOUSANDS/ΜL (ref 1.85–7.62)
NEUTS SEG NFR BLD AUTO: 66 % (ref 43–75)
NRBC BLD AUTO-RTO: 0 /100 WBCS
PLATELET # BLD AUTO: 378 THOUSANDS/UL (ref 149–390)
PMV BLD AUTO: 10.6 FL (ref 8.9–12.7)
RBC # BLD AUTO: 4.83 MILLION/UL (ref 3.81–5.12)
TIBC SERPL-MCNC: 266 UG/DL (ref 250–450)
WBC # BLD AUTO: 12.63 THOUSAND/UL (ref 4.31–10.16)

## 2018-08-13 PROCEDURE — 83550 IRON BINDING TEST: CPT

## 2018-08-13 PROCEDURE — 36415 COLL VENOUS BLD VENIPUNCTURE: CPT

## 2018-08-13 PROCEDURE — 83540 ASSAY OF IRON: CPT

## 2018-08-13 PROCEDURE — 82728 ASSAY OF FERRITIN: CPT

## 2018-08-13 PROCEDURE — 85025 COMPLETE CBC W/AUTO DIFF WBC: CPT

## 2018-08-14 ENCOUNTER — OFFICE VISIT (OUTPATIENT)
Dept: HEMATOLOGY ONCOLOGY | Facility: CLINIC | Age: 53
End: 2018-08-14
Payer: COMMERCIAL

## 2018-08-14 VITALS
OXYGEN SATURATION: 96 % | DIASTOLIC BLOOD PRESSURE: 80 MMHG | RESPIRATION RATE: 18 BRPM | HEIGHT: 60 IN | SYSTOLIC BLOOD PRESSURE: 118 MMHG | HEART RATE: 86 BPM | WEIGHT: 145.6 LBS | BODY MASS INDEX: 28.58 KG/M2 | TEMPERATURE: 97.7 F

## 2018-08-14 DIAGNOSIS — D50.0 IRON DEFICIENCY ANEMIA DUE TO CHRONIC BLOOD LOSS: Primary | ICD-10-CM

## 2018-08-14 PROCEDURE — 99214 OFFICE O/P EST MOD 30 MIN: CPT | Performed by: PHYSICIAN ASSISTANT

## 2018-08-14 RX ORDER — FERROUS SULFATE TAB EC 324 MG (65 MG FE EQUIVALENT) 324 (65 FE) MG
324 TABLET DELAYED RESPONSE ORAL
Qty: 60 TABLET | Refills: 5 | Status: SHIPPED | OUTPATIENT
Start: 2018-08-14 | End: 2019-03-04 | Stop reason: SDUPTHER

## 2018-08-14 NOTE — LETTER
August 14, 2018     Karis Gomez MD  [address] [place] [address]    Patient: Harshil Sanderson   YOB: 1965   Date of Visit: 8/14/2018       Dear Dr Sherrill Mcgrath: Thank you for referring Harshil Sanderson to me for evaluation  Below are my notes for this consultation  If you have questions, please do not hesitate to call me  I look forward to following your patient along with you  Sincerely,        Alec Saucedo PA-C        CC: No Recipients  Alec Saucedo PA-C  8/14/2018  9:05 AM  Sign at close encounter  [place] [address]  [phone]  Hematology Ambulatory Follow-Up  Harshil Sanderson, 1965, [id number]  8/14/2018    Assessment/Plan:    1  Iron deficiency anemia secondary to menorrhagia  Patient completed 2 doses of IV iron (Feraheme) in May 2018  Repeat hemoglobin demonstrates significant increase in the range of 13 grams/deciliter  Patient has also started on hormonal therapy to help control menstrual bleeding  Overall patient feels much improved  I recommended that the patient continue on her oral iron supplements as she continues to still have monthly menstruation  Oral iron regimen:  Iron 65 mg elemental, twice a day  Patient will follow up in approximately 3 months with blood work prior  I reviewed signs and symptoms of progressive anemia which include shortness of breath with exertion, fatigue, restlessness, sleep disturbances  Patient will follow up sooner if necessary  Patient knows she has to call the office and she will need blood work prior to moving her follow-up appointment  Additionally, the patient has an appointment with GI for colonoscopy in September 2   Leukocytosis, mild  This abnormality has been present since the patient has entered the [facility] system    Patient has medical records from [facility] in [facility] between the years 2710-7495  This office will acquire blood work from hospitalization to comment on white blood cell abnormalities  Patient is without night sweats, recurrent infection, progressive fatigue  Patient knows to call the office if she experiences these  Labs and imaging for follow up:  Orders Placed This Encounter   Procedures    CBC and differential     This is a patient instruction: This test is non-fasting  Please drink two glasses of water morning of bloodwork  Standing Status:   Future     Standing Expiration Date:   8/14/2019     The patient is scheduled for follow-up in approximately three months  Patient voiced agreement and understanding to the above  Patient knows to call the Hematology/Oncology office with any questions and concerns regarding the above  Carefully review your medication list in verify the list is accurate and up-to-date  Please call the hematologic/oncology office if there medications missing from the less, medications on the list that your not currently taking or if there is a dosage or instruction that is different from higher taking medication  I have spent 30 minutes with Patient and family today in which greater than 50% of this time was spent in counseling/coordination of care regarding Diagnostic results, Risks and benefits of tx options, Intructions for management, Patient and family education and Impressions  Barrier(s) to care: None  The patient is able to self care     -------------------------------------------------------------------------------------------------------    Chief complaint:   Chief Complaint   Patient presents with    Follow-up     10 weeks follow up with labs in [address] [place] [address]  History of present illness:   This is a 47year old female with long standing iron deficiency who has been taking oral iron supplements for several months/years prescribed by gynecologist secondary to heavy menstruation who presents to the Hematology office for evaluation of microcytic anemia      Patient notes that earlier this year she underwent an endometrial biopsy-2/22/18-negative for malignancy  Patient notes that she has been on a progesterone only pill, and has achieved later periods without clots  CBC evaluation was completed in May which demonstrated a hemoglobin decreased compared to that in February however RDW and MCV showed progressive signs of iron deficiency  Patient was referred to Hematology in May 2018 to discuss IV iron supplementation  Patient underwent IV Feraheme infusion x2 doses in May 2018  Patient tolerated doses without significant side effect  Patient has appreciated a significant response with iron saturation improved to 19% and ferritin at 89 ng/dL  Patient also followed up with gynecology who was able to start the patient on progesterone only supplements  Patient has noted a significant decrease in the amount of menstrual blood and perimenstural/ menstrual symptoms  Interval history:  Patient notes feeling much better  Iron infusions help the patient's fatigue significantly and improved sleep disturbances  Patient notes recent emergency room visit for headache that was secondary to stress and resolved with oral anti-inflammatories  Patient notes tolerating twice a day iron supplementation without significant side effects  Patient requests a refill this medication today  Review of Systems   Constitutional: Negative for appetite change, fatigue, fever and unexpected weight change  HENT: Negative for nosebleeds  Respiratory: Negative for cough, choking and shortness of breath  Negative hemoptysis  Cardiovascular: Negative for chest pain, palpitations and leg swelling  Gastrointestinal: Negative  Negative for abdominal distention, abdominal pain, anal bleeding, blood in stool, constipation, diarrhea, nausea and vomiting  Endocrine: Negative  Negative for cold intolerance     Genitourinary: Negative  Negative for hematuria, menstrual problem, vaginal bleeding, vaginal discharge and vaginal pain  Musculoskeletal: Negative  Negative for arthralgias, myalgias, neck pain and neck stiffness  Skin: Negative  Negative for color change, pallor and rash  Allergic/Immunologic: Negative  Negative for immunocompromised state  Neurological: Negative  Negative for weakness and headaches  Hematological: Negative for adenopathy  Does not bruise/bleed easily  All other systems reviewed and are negative  Patient Active Problem List   Diagnosis    Iron deficiency anemia due to chronic blood loss    Menorrhagia with regular cycle    Uterine fibroid    History of positive PPD    RBC microcytosis    Encounter for screening colonoscopy       Past Medical History:   Diagnosis Date    Anemia     Intraductal hyperplasia without atypia of right breast 02/2016    c florid and sclerosing adenosis, background dense stromal fibrosis    Iron deficiency anemia due to chronic blood loss     transfused 2015 9/17: ferritin 2   H/H 8 8/31 7%    Menorrhagia with regular cycle     Varicella     without complication       Past Surgical History:   Procedure Laterality Date    BREAST BIOPSY Right 02/2016    intraductal hyperplasia without atypia,fibroid and sclerosing adenosis, and columnar cell change in a bsckround of dense stromal fibrosis no malignancy identified    TUBAL LIGATION         Family History   Problem Relation Age of Onset   Brenda Olivares Breast cancer Mother         52's passed 76 yo c mets    Anemia Mother     Thyroid disease Mother         total thyroidectomy uncertain reason    Hyperlipidemia Father     Stroke Family     Deep vein thrombosis Neg Hx     Mental illness Neg Hx     Hypertension Neg Hx     Coronary artery disease Neg Hx     Diabetes type II Neg Hx        Social History     Social History    Marital status: /Civil Union     Spouse name: N/A    Number of children: N/A    Years of education: N/A     Occupational History    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      Social History Main Topics    Smoking status: Never Smoker    Smokeless tobacco: Never Used    Alcohol use No      Comment: rare    Drug use: No    Sexual activity: Not Asked     Other Topics Concern    None     Social History Narrative    ** Merged History Encounter **    Inadequate exercise    Muslim                  Current Outpatient Prescriptions:     diphenhydrAMINE (BENADRYL) 25 mg capsule, Take 1 capsule (25 mg total) by mouth every 6 (six) hours as needed for itching, Disp: 20 capsule, Rfl: 0    naproxen (NAPROSYN) 375 mg tablet, Take 1 tablet (375 mg total) by mouth 2 (two) times a day with meals, Disp: 20 tablet, Rfl: 0    naproxen (NAPROSYN) 375 mg tablet, Take 1 tablet (375 mg total) by mouth 2 (two) times a day with meals, Disp: 20 tablet, Rfl: 0    norethindrone (AYGESTIN) 5 mg tablet, Take 1 tab orally each day fom 1st-14th each month (Patient taking differently: Take 0 35 mg by mouth daily  ), Disp: 90 tablet, Rfl: 1    ferrous sulfate 324 (65 Fe) mg, Take 1 tablet (324 mg total) by mouth 2 (two) times a day before meals, Disp: 60 tablet, Rfl: 5    No Known Allergies    Objective:  /80 (BP Location: Left arm, Cuff Size: Standard)   Pulse 86   Temp 97 7 °F (36 5 °C) (Tympanic)   Resp 18   Ht 5' (1 524 m)   Wt 66 kg (145 lb 9 6 oz)   LMP 07/27/2018   SpO2 96%   BMI 28 44 kg/m²     Physical Exam   Constitutional: She is oriented to person, place, and time  She appears well-developed and well-nourished  No distress  HENT:   Head: Normocephalic and atraumatic  Eyes: Pupils are equal, round, and reactive to light  No scleral icterus  Cardiovascular: Normal rate and regular rhythm  No murmur heard  Pulmonary/Chest: Effort normal  No respiratory distress  Musculoskeletal: She exhibits no edema  Neurological: She is alert and oriented to person, place, and time     Skin: Skin is warm  No rash noted  No pallor  Psychiatric: She has a normal mood and affect  Thought content normal    Vitals reviewed  Result Review  Labs:  Admission on 07/14/2018, Discharged on 07/14/2018   Component Date Value Ref Range Status    WBC 07/14/2018 11 70* 4 50 - 11 00 Thousand/uL Final    RBC 07/14/2018 4 76  4 00 - 5 20 Million/uL Final    Hemoglobin 07/14/2018 12 8  12 0 - 16 0 g/dL Final    Hematocrit 07/14/2018 39 5  36 0 - 46 0 % Final    MCV 07/14/2018 83  80 - 100 fL Final    MCH 07/14/2018 26 9  26 0 - 34 0 pg Final    MCHC 07/14/2018 32 5  31 0 - 36 0 g/dL Final    RDW 07/14/2018 26 8* <15 3 % Final    MPV 07/14/2018 9 1  8 9 - 12 7 fL Final    Platelets 83/50/9803 266  150 - 450 Thousands/uL Final    Neutrophils Relative 07/14/2018 69* 45 - 65 % Final    Lymphocytes Relative 07/14/2018 19* 20 - 50 % Final    Monocytes Relative 07/14/2018 8  1 - 10 % Final    Eosinophils Relative 07/14/2018 3  0 - 6 % Final    Basophils Relative 07/14/2018 1  0 - 1 % Final    Neutrophils Absolute 07/14/2018 8 00* 1 80 - 7 80 Thousands/µL Final    Lymphocytes Absolute 07/14/2018 2 20  0 50 - 4 00 Thousands/µL Final    Monocytes Absolute 07/14/2018 1 00* 0 20 - 0 90 Thousand/µL Final    Eosinophils Absolute 07/14/2018 0 40  0 00 - 0 40 Thousand/µL Final    Basophils Absolute 07/14/2018 0 10  0 00 - 0 10 Thousands/µL Final    RBC Morphology 07/14/2018 Normal   Final    Platelet Estimate 28/33/7074 Adequate  Adequate Final       Please note: This report has been generated by a voice recognition software system  Therefore there may be syntax, spelling, and/or grammatical errors  Please call if you have any questions

## 2018-08-14 NOTE — PROGRESS NOTES
[address] [facility] [place]  [address] [place] [address] [address]  [facility] [address]   [phone]  Hematology Ambulatory Follow-Up  Harshil Sanderson, 1965, [id number]  8/14/2018    Assessment/Plan:    1  Iron deficiency anemia secondary to menorrhagia  Patient completed 2 doses of IV iron (Feraheme) in May 2018  Repeat hemoglobin demonstrates significant increase in the range of 13 grams/deciliter  Patient has also started on hormonal therapy to help control menstrual bleeding  Overall patient feels much improved  I recommended that the patient continue on her oral iron supplements as she continues to still have monthly menstruation  Oral iron regimen:  Iron 65 mg elemental, twice a day  Patient will follow up in approximately 3 months with blood work prior  I reviewed signs and symptoms of progressive anemia which include shortness of breath with exertion, fatigue, restlessness, sleep disturbances  Patient will follow up sooner if necessary  Patient knows she has to call the office and she will need blood work prior to moving her follow-up appointment  Additionally, the patient has an appointment with GI for colonoscopy in September 2   Leukocytosis, mild  This abnormality has been present since the patient has entered the [facility] system  Patient has medical records from [facility] in [facility] between the years 0247-2109  This office will acquire blood work from hospitalization to comment on white blood cell abnormalities  Patient is without night sweats, recurrent infection, progressive fatigue  Patient knows to call the office if she experiences these  Labs and imaging for follow up:  Orders Placed This Encounter   Procedures    CBC and differential     This is a patient instruction: This test is non-fasting  Please drink two glasses of water morning of bloodwork          Standing Status:   Future     Standing Expiration Date:   8/14/2019     The patient is scheduled for follow-up in approximately three months  Patient voiced agreement and understanding to the above  Patient knows to call the Hematology/Oncology office with any questions and concerns regarding the above  Carefully review your medication list in verify the list is accurate and up-to-date  Please call the hematologic/oncology office if there medications missing from the less, medications on the list that your not currently taking or if there is a dosage or instruction that is different from higher taking medication  I have spent 30 minutes with Patient and family today in which greater than 50% of this time was spent in counseling/coordination of care regarding Diagnostic results, Risks and benefits of tx options, Intructions for management, Patient and family education and Impressions  Barrier(s) to care: None  The patient is able to self care     -------------------------------------------------------------------------------------------------------    Chief complaint:   Chief Complaint   Patient presents with    Follow-up     10 weeks follow up with labs in [address] [place] [address]  History of present illness: This is a 47year old female with long standing iron deficiency who has been taking oral iron supplements for several months/years prescribed by gynecologist secondary to heavy menstruation who presents to the Hematology office for evaluation of microcytic anemia      Patient notes that earlier this year she underwent an endometrial biopsy-2/22/18-negative for malignancy  Patient notes that she has been on a progesterone only pill, and has achieved later periods without clots  CBC evaluation was completed in May which demonstrated a hemoglobin decreased compared to that in February however RDW and MCV showed progressive signs of iron deficiency  Patient was referred to Hematology in May 2018 to discuss IV iron supplementation      Patient underwent IV Feraheme infusion x2 doses in May 2018  Patient tolerated doses without significant side effect  Patient has appreciated a significant response with iron saturation improved to 19% and ferritin at 89 ng/dL  Patient also followed up with gynecology who was able to start the patient on progesterone only supplements  Patient has noted a significant decrease in the amount of menstrual blood and perimenstural/ menstrual symptoms  Interval history:  Patient notes feeling much better  Iron infusions help the patient's fatigue significantly and improved sleep disturbances  Patient notes recent emergency room visit for headache that was secondary to stress and resolved with oral anti-inflammatories  Patient notes tolerating twice a day iron supplementation without significant side effects  Patient requests a refill this medication today  Review of Systems   Constitutional: Negative for appetite change, fatigue, fever and unexpected weight change  HENT: Negative for nosebleeds  Respiratory: Negative for cough, choking and shortness of breath  Negative hemoptysis  Cardiovascular: Negative for chest pain, palpitations and leg swelling  Gastrointestinal: Negative  Negative for abdominal distention, abdominal pain, anal bleeding, blood in stool, constipation, diarrhea, nausea and vomiting  Endocrine: Negative  Negative for cold intolerance  Genitourinary: Negative  Negative for hematuria, menstrual problem, vaginal bleeding, vaginal discharge and vaginal pain  Musculoskeletal: Negative  Negative for arthralgias, myalgias, neck pain and neck stiffness  Skin: Negative  Negative for color change, pallor and rash  Allergic/Immunologic: Negative  Negative for immunocompromised state  Neurological: Negative  Negative for weakness and headaches  Hematological: Negative for adenopathy  Does not bruise/bleed easily  All other systems reviewed and are negative        Patient Active Problem List   Diagnosis    Iron deficiency anemia due to chronic blood loss    Menorrhagia with regular cycle    Uterine fibroid    History of positive PPD    RBC microcytosis    Encounter for screening colonoscopy       Past Medical History:   Diagnosis Date    Anemia     Intraductal hyperplasia without atypia of right breast 02/2016    c florid and sclerosing adenosis, background dense stromal fibrosis    Iron deficiency anemia due to chronic blood loss     transfused 2015 9/17: ferritin 2   H/H 8 8/31 7%    Menorrhagia with regular cycle     Varicella     without complication       Past Surgical History:   Procedure Laterality Date    BREAST BIOPSY Right 02/2016    intraductal hyperplasia without atypia,fibroid and sclerosing adenosis, and columnar cell change in a bsckround of dense stromal fibrosis no malignancy identified    TUBAL LIGATION         Family History   Problem Relation Age of Onset   [facility] Breast cancer Mother         52's passed 76 yo c mets    Anemia Mother     Thyroid disease Mother         total thyroidectomy uncertain reason    Hyperlipidemia Father     Stroke Family     Deep vein thrombosis Neg Hx     Mental illness Neg Hx     Hypertension Neg Hx     Coronary artery disease Neg Hx     Diabetes type II Neg Hx        Social History     Social History    Marital status: /Civil Union     Spouse name: N/A    Number of children: N/A    Years of education: N/A     Occupational History    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      Social History Main Topics    Smoking status: Never Smoker    Smokeless tobacco: Never Used    Alcohol use No      Comment: rare    Drug use: No    Sexual activity: Not Asked     Other Topics Concern    None     Social History Narrative    ** Merged History Encounter **    Inadequate exercise    Spiritism                  Current Outpatient Prescriptions:     diphenhydrAMINE (BENADRYL) 25 mg capsule, Take 1 capsule (25 mg total) by mouth every 6 (six) hours as needed for itching, Disp: 20 capsule, Rfl: 0    naproxen (NAPROSYN) 375 mg tablet, Take 1 tablet (375 mg total) by mouth 2 (two) times a day with meals, Disp: 20 tablet, Rfl: 0    naproxen (NAPROSYN) 375 mg tablet, Take 1 tablet (375 mg total) by mouth 2 (two) times a day with meals, Disp: 20 tablet, Rfl: 0    norethindrone (AYGESTIN) 5 mg tablet, Take 1 tab orally each day fom 1st-14th each month (Patient taking differently: Take 0 35 mg by mouth daily  ), Disp: 90 tablet, Rfl: 1    ferrous sulfate 324 (65 Fe) mg, Take 1 tablet (324 mg total) by mouth 2 (two) times a day before meals, Disp: 60 tablet, Rfl: 5    No Known Allergies    Objective:  /80 (BP Location: Left arm, Cuff Size: Standard)   Pulse 86   Temp 97 7 °F (36 5 °C) (Tympanic)   Resp 18   Ht 5' (1 524 m)   Wt 66 kg (145 lb 9 6 oz)   LMP 07/27/2018   SpO2 96%   BMI 28 44 kg/m²    Physical Exam   Constitutional: She is oriented to person, place, and time  She appears well-developed and well-nourished  No distress  HENT:   Head: Normocephalic and atraumatic  Eyes: Pupils are equal, round, and reactive to light  No scleral icterus  Cardiovascular: Normal rate and regular rhythm  No murmur heard  Pulmonary/Chest: Effort normal  No respiratory distress  Musculoskeletal: She exhibits no edema  Neurological: She is alert and oriented to person, place, and time  Skin: Skin is warm  No rash noted  No pallor  Psychiatric: She has a normal mood and affect  Thought content normal    Vitals reviewed      Result Review  Labs:  Admission on 07/14/2018, Discharged on 07/14/2018   Component Date Value Ref Range Status    WBC 07/14/2018 11 70* 4 50 - 11 00 Thousand/uL Final    RBC 07/14/2018 4 76  4 00 - 5 20 Million/uL Final    Hemoglobin 07/14/2018 12 8  12 0 - 16 0 g/dL Final    Hematocrit 07/14/2018 39 5  36 0 - 46 0 % Final    MCV 07/14/2018 83  80 - 100 fL Final    MCH 07/14/2018 26 9  26 0 - 34 0 pg Final    MCHC 07/14/2018 32 5  31 0 - 36 0 g/dL Final    RDW 07/14/2018 26 8* <15 3 % Final    MPV 07/14/2018 9 1  8 9 - 12 7 fL Final    Platelets 28/91/2937 266  150 - 450 Thousands/uL Final    Neutrophils Relative 07/14/2018 69* 45 - 65 % Final    Lymphocytes Relative 07/14/2018 19* 20 - 50 % Final    Monocytes Relative 07/14/2018 8  1 - 10 % Final    Eosinophils Relative 07/14/2018 3  0 - 6 % Final    Basophils Relative 07/14/2018 1  0 - 1 % Final    Neutrophils Absolute 07/14/2018 8 00* 1 80 - 7 80 Thousands/µL Final    Lymphocytes Absolute 07/14/2018 2 20  0 50 - 4 00 Thousands/µL Final    Monocytes Absolute 07/14/2018 1 00* 0 20 - 0 90 Thousand/µL Final    Eosinophils Absolute 07/14/2018 0 40  0 00 - 0 40 Thousand/µL Final    Basophils Absolute 07/14/2018 0 10  0 00 - 0 10 Thousands/µL Final    RBC Morphology 07/14/2018 Normal   Final    Platelet Estimate 68/28/3042 Adequate  Adequate Final       Please note: This report has been generated by a voice recognition software system  Therefore there may be syntax, spelling, and/or grammatical errors  Please call if you have any questions

## 2018-08-15 ENCOUNTER — APPOINTMENT (OUTPATIENT)
Dept: URGENT CARE | Age: 53
End: 2018-08-15
Payer: OTHER MISCELLANEOUS

## 2018-08-15 PROCEDURE — 99213 OFFICE O/P EST LOW 20 MIN: CPT | Performed by: PREVENTIVE MEDICINE

## 2018-08-16 ENCOUNTER — TELEPHONE (OUTPATIENT)
Dept: GASTROENTEROLOGY | Facility: CLINIC | Age: 53
End: 2018-08-16

## 2018-08-16 NOTE — TELEPHONE ENCOUNTER
Called patient, her policy on file is terminated for procedure 9/20/18  She is going to call her insurance, she started a new job and should have a new policy  She will call me back

## 2018-08-22 ENCOUNTER — APPOINTMENT (OUTPATIENT)
Dept: URGENT CARE | Age: 53
End: 2018-08-22
Payer: OTHER MISCELLANEOUS

## 2018-08-22 PROCEDURE — 99214 OFFICE O/P EST MOD 30 MIN: CPT | Performed by: PREVENTIVE MEDICINE

## 2018-09-01 ENCOUNTER — HOSPITAL ENCOUNTER (EMERGENCY)
Facility: HOSPITAL | Age: 53
Discharge: HOME | End: 2018-09-01
Attending: EMERGENCY MEDICINE | Admitting: EMERGENCY MEDICINE
Payer: COMMERCIAL

## 2018-09-01 VITALS
OXYGEN SATURATION: 98 % | WEIGHT: 147 LBS | SYSTOLIC BLOOD PRESSURE: 106 MMHG | HEIGHT: 60 IN | HEART RATE: 88 BPM | RESPIRATION RATE: 20 BRPM | DIASTOLIC BLOOD PRESSURE: 79 MMHG | TEMPERATURE: 97 F | BODY MASS INDEX: 28.86 KG/M2

## 2018-09-01 DIAGNOSIS — N93.8 DUB (DYSFUNCTIONAL UTERINE BLEEDING): Primary | ICD-10-CM

## 2018-09-01 LAB
ALBUMIN SERPL BCP-MCNC: 3.6 G/DL (ref 3–5.2)
ALP SERPL-CCNC: 103 U/L (ref 43–122)
ALT SERPL W P-5'-P-CCNC: 25 U/L (ref 9–52)
ANION GAP SERPL CALCULATED.3IONS-SCNC: 6 MMOL/L (ref 5–14)
AST SERPL W P-5'-P-CCNC: 15 U/L (ref 14–36)
BACTERIA UR QL AUTO: ABNORMAL /HPF
BASOPHILS # BLD AUTO: 0.1 THOUSANDS/ΜL (ref 0–0.1)
BASOPHILS NFR BLD AUTO: 1 % (ref 0–1)
BILIRUB SERPL-MCNC: 0.2 MG/DL
BILIRUB UR QL STRIP: NEGATIVE
BUN SERPL-MCNC: 9 MG/DL (ref 5–25)
CALCIUM SERPL-MCNC: 9.5 MG/DL (ref 8.4–10.2)
CHLORIDE SERPL-SCNC: 108 MMOL/L (ref 97–108)
CLARITY UR: ABNORMAL
CO2 SERPL-SCNC: 24 MMOL/L (ref 22–30)
COLOR UR: ABNORMAL
CREAT SERPL-MCNC: 0.58 MG/DL (ref 0.6–1.2)
EOSINOPHIL # BLD AUTO: 0.1 THOUSAND/ΜL (ref 0–0.4)
EOSINOPHIL NFR BLD AUTO: 1 % (ref 0–6)
ERYTHROCYTE [DISTWIDTH] IN BLOOD BY AUTOMATED COUNT: 14 %
EXT PREG TEST URINE: NEGATIVE
GFR SERPL CREATININE-BSD FRML MDRD: 106 ML/MIN/1.73SQ M
GLUCOSE SERPL-MCNC: 98 MG/DL (ref 70–99)
GLUCOSE UR STRIP-MCNC: NEGATIVE MG/DL
HCT VFR BLD AUTO: 39.1 % (ref 36–46)
HGB BLD-MCNC: 13 G/DL (ref 12–16)
HGB UR QL STRIP.AUTO: 250
KETONES UR STRIP-MCNC: NEGATIVE MG/DL
LEUKOCYTE ESTERASE UR QL STRIP: NEGATIVE
LIPASE SERPL-CCNC: 69 U/L (ref 23–300)
LYMPHOCYTES # BLD AUTO: 2.6 THOUSANDS/ΜL (ref 0.5–4)
LYMPHOCYTES NFR BLD AUTO: 21 % (ref 20–50)
MCH RBC QN AUTO: 29.3 PG (ref 26–34)
MCHC RBC AUTO-ENTMCNC: 33.2 G/DL (ref 31–36)
MCV RBC AUTO: 88 FL (ref 80–100)
MONOCYTES # BLD AUTO: 0.7 THOUSAND/ΜL (ref 0.2–0.9)
MONOCYTES NFR BLD AUTO: 6 % (ref 1–10)
NEUTROPHILS # BLD AUTO: 8.9 THOUSANDS/ΜL (ref 1.8–7.8)
NEUTS SEG NFR BLD AUTO: 72 % (ref 45–65)
NITRITE UR QL STRIP: NEGATIVE
NON-SQ EPI CELLS URNS QL MICRO: ABNORMAL /HPF
PH UR STRIP.AUTO: 8 [PH] (ref 4.5–8)
PLATELET # BLD AUTO: 277 THOUSANDS/UL (ref 150–450)
PLATELET BLD QL SMEAR: ADEQUATE
PMV BLD AUTO: 9.1 FL (ref 8.9–12.7)
POTASSIUM SERPL-SCNC: 3.9 MMOL/L (ref 3.6–5)
PROT SERPL-MCNC: 7.4 G/DL (ref 5.9–8.4)
PROT UR STRIP-MCNC: >=500 MG/DL
RBC # BLD AUTO: 4.43 MILLION/UL (ref 4–5.2)
RBC #/AREA URNS AUTO: ABNORMAL /HPF
RBC MORPH BLD: NORMAL
SODIUM SERPL-SCNC: 138 MMOL/L (ref 137–147)
SP GR UR STRIP.AUTO: 1.01 (ref 1–1.04)
UROBILINOGEN UA: NEGATIVE MG/DL
WBC # BLD AUTO: 12.5 THOUSAND/UL (ref 4.5–11)
WBC #/AREA URNS AUTO: ABNORMAL /HPF

## 2018-09-01 PROCEDURE — 96374 THER/PROPH/DIAG INJ IV PUSH: CPT

## 2018-09-01 PROCEDURE — 81003 URINALYSIS AUTO W/O SCOPE: CPT | Performed by: PHYSICIAN ASSISTANT

## 2018-09-01 PROCEDURE — 83690 ASSAY OF LIPASE: CPT | Performed by: PHYSICIAN ASSISTANT

## 2018-09-01 PROCEDURE — 85025 COMPLETE CBC W/AUTO DIFF WBC: CPT | Performed by: PHYSICIAN ASSISTANT

## 2018-09-01 PROCEDURE — 36415 COLL VENOUS BLD VENIPUNCTURE: CPT | Performed by: PHYSICIAN ASSISTANT

## 2018-09-01 PROCEDURE — 96361 HYDRATE IV INFUSION ADD-ON: CPT

## 2018-09-01 PROCEDURE — 87510 GARDNER VAG DNA DIR PROBE: CPT | Performed by: PHYSICIAN ASSISTANT

## 2018-09-01 PROCEDURE — 81025 URINE PREGNANCY TEST: CPT | Performed by: PHYSICIAN ASSISTANT

## 2018-09-01 PROCEDURE — 99284 EMERGENCY DEPT VISIT MOD MDM: CPT

## 2018-09-01 PROCEDURE — 87491 CHLMYD TRACH DNA AMP PROBE: CPT | Performed by: PHYSICIAN ASSISTANT

## 2018-09-01 PROCEDURE — 87480 CANDIDA DNA DIR PROBE: CPT | Performed by: PHYSICIAN ASSISTANT

## 2018-09-01 PROCEDURE — 87591 N.GONORRHOEAE DNA AMP PROB: CPT | Performed by: PHYSICIAN ASSISTANT

## 2018-09-01 PROCEDURE — 80053 COMPREHEN METABOLIC PANEL: CPT | Performed by: PHYSICIAN ASSISTANT

## 2018-09-01 PROCEDURE — 87660 TRICHOMONAS VAGIN DIR PROBE: CPT | Performed by: PHYSICIAN ASSISTANT

## 2018-09-01 PROCEDURE — 81001 URINALYSIS AUTO W/SCOPE: CPT | Performed by: PHYSICIAN ASSISTANT

## 2018-09-01 RX ORDER — KETOROLAC TROMETHAMINE 30 MG/ML
30 INJECTION, SOLUTION INTRAMUSCULAR; INTRAVENOUS ONCE
Status: COMPLETED | OUTPATIENT
Start: 2018-09-01 | End: 2018-09-01

## 2018-09-01 RX ORDER — KETOROLAC TROMETHAMINE 30 MG/ML
15 INJECTION, SOLUTION INTRAMUSCULAR; INTRAVENOUS ONCE
Status: DISCONTINUED | OUTPATIENT
Start: 2018-09-01 | End: 2018-09-01

## 2018-09-01 RX ORDER — ACETAMINOPHEN 325 MG/1
TABLET ORAL
Status: COMPLETED
Start: 2018-09-01 | End: 2018-09-01

## 2018-09-01 RX ORDER — IBUPROFEN 600 MG/1
600 TABLET ORAL EVERY 6 HOURS PRN
Qty: 30 TABLET | Refills: 0 | Status: SHIPPED | OUTPATIENT
Start: 2018-09-01 | End: 2019-06-17 | Stop reason: SDUPTHER

## 2018-09-01 RX ORDER — KETOROLAC TROMETHAMINE 30 MG/ML
INJECTION, SOLUTION INTRAMUSCULAR; INTRAVENOUS
Status: COMPLETED
Start: 2018-09-01 | End: 2018-09-01

## 2018-09-01 RX ORDER — SODIUM CHLORIDE 9 MG/ML
250 INJECTION, SOLUTION INTRAVENOUS CONTINUOUS
Status: DISCONTINUED | OUTPATIENT
Start: 2018-09-01 | End: 2018-09-01 | Stop reason: HOSPADM

## 2018-09-01 RX ORDER — ACETAMINOPHEN 325 MG/1
650 TABLET ORAL ONCE
Status: COMPLETED | OUTPATIENT
Start: 2018-09-01 | End: 2018-09-01

## 2018-09-01 RX ADMIN — ACETAMINOPHEN 650 MG: 325 TABLET ORAL at 09:09

## 2018-09-01 RX ADMIN — KETOROLAC TROMETHAMINE 30 MG: 30 INJECTION, SOLUTION INTRAMUSCULAR; INTRAVENOUS at 09:10

## 2018-09-01 RX ADMIN — SODIUM CHLORIDE 250 ML/HR: 9 INJECTION, SOLUTION INTRAVENOUS at 08:03

## 2018-09-01 NOTE — DISCHARGE INSTRUCTIONS
Dysfunctional Uterine Bleeding   WHAT YOU NEED TO KNOW:   What is dysfunctional uterine bleeding? Dysfunctional uterine bleeding (DUB) is abnormal uterine bleeding that is caused by a problem with your hormones  You may have bleeding from your uterus at times other than your normal monthly period  Your monthly periods may last longer or shorter, and bleeding may be heavier or lighter than usual    What causes DUB? DUB may be caused by too much or too little estrogen  You may have abnormal bleeding if an ovary does not release an egg during ovulation  Medical conditions such as polycystic ovary syndrome may increase your risk for DUB  What are the signs and symptoms of DUB? · Bleeding or spotting between periods    · Bleeding that starts 12 months or longer after you have been through menopause    · The amount of bleeding during your period is heavier or lighter than usual    · The number of days that you bleed during your regular period is longer than usual, or more than 7 days    · The number of days that you bleed is shorter than usual, or less than 2 days    · The time between your monthly periods is shorter or longer than usual  How is DUB diagnosed? · Blood tests  may be done to find the cause of your DUB and problems caused by DUB, such as anemia  · A pelvic exam  may be done to find the source of your bleeding  · A hysteroscopy  is a procedure to look at your endometrium  The endometrium is the lining inside of your uterus  Your healthcare provider will insert a small tube with a camera at the end into your uterus  · A biopsy  is a procedure to remove a small piece of tissue from the endometrium  The tissue is sent to a lab for tests  · An ultrasound  uses sound waves to show pictures of your uterus, ovaries, tubes, and vagina on a monitor  · A pap smear  may be needed  Your healthcare provider takes a sample of tissue from your cervix and sends it to a lab for tests    How is DUB treated? · Medicines:      ¨ Hormones  help decrease bleeding by making your monthly periods more regular  Sometimes this medicine may be given as birth control pills  ¨ NSAIDs  help decrease swelling and pain or fever  This medicine is available with or without a doctor's order  NSAIDs can cause stomach bleeding or kidney problems in certain people  If you take blood thinner medicine, always ask your healthcare provider if NSAIDs are safe for you  Always read the medicine label and follow directions  ¨ Iron supplements  may be given if your blood iron level decreases because of heavy bleeding  Iron may make you constipated  Ask your healthcare provider for ways to prevent or treat constipation  Iron may also make your bowel movements turn dark or black  · Surgery and procedures  may be needed if medicines do not work or cannot be used  You may need procedures, such as endometrial ablation or dilation and curettage, to control your bleeding  You may need an abdominal or vaginal hysterectomy  A hysterectomy is surgery to remove your uterus  How do I care for myself at home? · Apply heat  on your lower abdomen for 20 to 30 minutes every 2 hours for as many days as directed  Heat helps decrease pain and muscle spasms  · Include foods high in iron  if needed  Examples of foods high in iron are leafy green vegetables, beef, pork, liver, eggs, and whole-grain breads and cereals  · Keep a diary of your menstrual cycles  Keep track of the number of tampons or pads you use each day  · Talk to your healthcare provider before you start a weight loss program   You may need to wait until the abnormal bleeding has stopped before you try to lose weight  The amount of iron in your blood should be normal before you lose weight  When should I contact my healthcare provider? · You need to change your sanitary pad or tampon more than once an hour      · Your medicine causes nausea, vomiting, or diarrhea  · You have questions or concerns about your condition or care  When should I seek immediate care or call 911? · You continue to bleed heavily, or you feel faint  CARE AGREEMENT:   You have the right to help plan your care  Learn about your health condition and how it may be treated  Discuss treatment options with your caregivers to decide what care you want to receive  You always have the right to refuse treatment  The above information is an  only  It is not intended as medical advice for individual conditions or treatments  Talk to your doctor, nurse or pharmacist before following any medical regimen to see if it is safe and effective for you  © 2017 [employer]  Information is for End User's use only and may not be sold, redistributed or otherwise used for commercial purposes  All illustrations and images included in CareNotes® are the copyrighted property of [employer]  or [employer]

## 2018-09-01 NOTE — ED PROVIDER NOTES
History  Chief Complaint   Patient presents with    Vaginal Bleeding     I started with a heavier vaginal bleed last night  I have been sporatic with my periods  History provided by:  Patient   used: No    Medical Problem   Location:  Pt with heavier vaginal bleeding starting lase edy  her menses was normal   but got much heavier last edy with pelvis cramping   Quality:  Has had this problem all year long with heavy vaginal bleeding  Severity:  Mild  Onset quality:  Gradual  Duration:  1 day  Timing:  Constant  Progression:  Unchanged  Chronicity:  Recurrent  Associated symptoms: abdominal pain    Associated symptoms: no chest pain, no congestion, no cough, no diarrhea, no ear pain, no fatigue, no fever, no headaches, no loss of consciousness, no myalgias, no nausea, no rash, no rhinorrhea, no shortness of breath, no sore throat, no vomiting and no wheezing        Prior to Admission Medications   Prescriptions Last Dose Informant Patient Reported? Taking?    diphenhydrAMINE (BENADRYL) 25 mg capsule  Self No No   Sig: Take 1 capsule (25 mg total) by mouth every 6 (six) hours as needed for itching   ferrous sulfate 324 (65 Fe) mg   No No   Sig: Take 1 tablet (324 mg total) by mouth 2 (two) times a day before meals   naproxen (NAPROSYN) 375 mg tablet  Self No No   Sig: Take 1 tablet (375 mg total) by mouth 2 (two) times a day with meals   naproxen (NAPROSYN) 375 mg tablet  Self No No   Sig: Take 1 tablet (375 mg total) by mouth 2 (two) times a day with meals   norethindrone (AYGESTIN) 5 mg tablet  Self No No   Sig: Take 1 tab orally each day fom 1st-14th each month   Patient taking differently: Take 0 35 mg by mouth daily        Facility-Administered Medications: None       Past Medical History:   Diagnosis Date    Anemia     Intraductal hyperplasia without atypia of right breast 02/2016    c florid and sclerosing adenosis, background dense stromal fibrosis    Iron deficiency anemia due to chronic blood loss     transfused 2015 9/17: ferritin 2  H/H 8 8/31 7%    Menorrhagia with regular cycle     Varicella     without complication       Past Surgical History:   Procedure Laterality Date    BREAST BIOPSY Right 02/2016    intraductal hyperplasia without atypia,fibroid and sclerosing adenosis, and columnar cell change in a bsckround of dense stromal fibrosis no malignancy identified    TUBAL LIGATION         Family History   Problem Relation Age of Onset   Caroline Villavicencio Breast cancer Mother         52's passed 78 yo c mets    Anemia Mother     Thyroid disease Mother         total thyroidectomy uncertain reason    Hyperlipidemia Father     Stroke Family     Deep vein thrombosis Neg Hx     Mental illness Neg Hx     Hypertension Neg Hx     Coronary artery disease Neg Hx     Diabetes type II Neg Hx      I have reviewed and agree with the history as documented  Social History   Substance Use Topics    Smoking status: Never Smoker    Smokeless tobacco: Never Used    Alcohol use No      Comment: rare        Review of Systems   Constitutional: Negative  Negative for fatigue and fever  HENT: Negative  Negative for congestion, ear pain, rhinorrhea and sore throat  Eyes: Negative  Respiratory: Negative  Negative for cough, shortness of breath and wheezing  Cardiovascular: Negative  Negative for chest pain  Gastrointestinal: Positive for abdominal pain  Negative for diarrhea, nausea and vomiting  Endocrine: Negative  Genitourinary: Positive for vaginal bleeding  Musculoskeletal: Negative  Negative for myalgias  Skin: Negative  Negative for rash  Allergic/Immunologic: Negative  Neurological: Negative  Negative for loss of consciousness and headaches  Hematological: Negative  Psychiatric/Behavioral: Negative  All other systems reviewed and are negative  Physical Exam  Physical Exam   Constitutional: She is oriented to person, place, and time   She appears well-developed and well-nourished  1000am pt feels much better    HENT:   Head: Normocephalic  Right Ear: External ear normal    Left Ear: External ear normal    Nose: Nose normal    Mouth/Throat: Oropharynx is clear and moist    Eyes: Conjunctivae and EOM are normal  Pupils are equal, round, and reactive to light  Neck: Normal range of motion  Neck supple  Cardiovascular: Normal rate, regular rhythm and normal heart sounds  Pulmonary/Chest: Effort normal and breath sounds normal    Abdominal: Soft  Bowel sounds are normal    Suprapubic pressure /tender    Genitourinary:   Genitourinary Comments: External exam wnl    Dark red blood from os  Minor uterine tenderness    Musculoskeletal: Normal range of motion  Neurological: She is alert and oriented to person, place, and time  Skin: Skin is warm  Psychiatric: She has a normal mood and affect  Her behavior is normal  Judgment and thought content normal    Nursing note and vitals reviewed        Vital Signs  ED Triage Vitals   Temperature Pulse Respirations Blood Pressure SpO2   09/01/18 0732 09/01/18 0732 09/01/18 0732 09/01/18 0732 09/01/18 0732   (!) 97 °F (36 1 °C) 83 16 106/79 98 %      Temp src Heart Rate Source Patient Position - Orthostatic VS BP Location FiO2 (%)   -- 09/01/18 0732 09/01/18 0959 09/01/18 0732 --    Monitor Lying Left arm       Pain Score       --                  Vitals:    09/01/18 0732 09/01/18 0959   BP: 106/79    Pulse: 83 88   Patient Position - Orthostatic VS:  Lying       Visual Acuity      ED Medications  Medications   acetaminophen (TYLENOL) tablet 650 mg (650 mg Oral Given 9/1/18 0909)   ketorolac (TORADOL) injection 30 mg (30 mg Intravenous Given 9/1/18 0910)       Diagnostic Studies  Results Reviewed     Procedure Component Value Units Date/Time    VAGINOSIS DNA PROBE (AFFIRM) [[id number]] Collected:  09/01/18 0854    Lab Status:  Final result Specimen:  Genital from Vaginal Updated:  09/02/18 1406     Candida Species Negative     Gardnerella vaginalis Negative     Trichomonas vaginalis Negative    Narrative:       Performed at:  [address] [place] [address]  [id number]  : Eneida Aquino MD, Phone:  [phone]    [address] [place] [address] amplified DNA by Vance Mahmood [[id number]] Collected:  09/01/18 0854    Lab Status:   In process Specimen:  Cervix Updated:  09/01/18 0959    Urine Microscopic [[id number]]  (Abnormal) Collected:  09/01/18 0749    Lab Status:  Final result Specimen:  Urine from Urine, Other Updated:  09/01/18 0853     RBC, UA Innumerable (A) /hpf      WBC, UA 0-1 (A) /hpf      Epithelial Cells Occasional /hpf      Bacteria, UA Occasional /hpf     UA w Reflex to Microscopic w Reflex to Culture [[id number]]  (Abnormal) Collected:  09/01/18 0749    Lab Status:  Final result Specimen:  Urine from Urine, Other Updated:  09/01/18 0840     Color, UA Red (A)     Clarity, UA Cloudy (A)     Specific Gravity, UA 1 010     pH, UA 8 0     Leukocytes, UA Negative     Nitrite, UA Negative     Protein, UA >=500 (A) mg/dl      Glucose, UA Negative mg/dl      Ketones, UA Negative mg/dl      Bilirubin, UA Negative     Blood,  0 (A)     UROBILINOGEN UA Negative mg/dL     CBC and differential [[id number]]  (Abnormal) Collected:  09/01/18 0801    Lab Status:  Final result Specimen:  Blood from Arm, Right Updated:  09/01/18 0830     WBC 12 50 (H) Thousand/uL      RBC 4 43 Million/uL      Hemoglobin 13 0 g/dL      Hematocrit 39 1 %      MCV 88 fL      MCH 29 3 pg      MCHC 33 2 g/dL      RDW 14 0 %      MPV 9 1 fL      Platelets 253 Thousands/uL      Neutrophils Relative 72 (H) %      Lymphocytes Relative 21 %      Monocytes Relative 6 %      Eosinophils Relative 1 %      Basophils Relative 1 %      Neutrophils Absolute 8 90 (H) Thousands/µL      Lymphocytes Absolute 2 60 Thousands/µL      Monocytes Absolute 0 70 Thousand/µL      Eosinophils Absolute 0 10 Thousand/µL      Basophils Absolute 0 10 Thousands/µL     Lipase [[id number]]  (Normal) Collected:  09/01/18 0801    Lab Status:  Final result Specimen:  Blood from Arm, Right Updated:  09/01/18 0827     Lipase 69 u/L     Comprehensive metabolic panel [[id number]]  (Abnormal) Collected:  09/01/18 0801    Lab Status:  Final result Specimen:  Blood from Arm, Right Updated:  09/01/18 0827     Sodium 138 mmol/L      Potassium 3 9 mmol/L      Chloride 108 mmol/L      CO2 24 mmol/L      ANION GAP 6 mmol/L      BUN 9 mg/dL      Creatinine 0 58 (L) mg/dL      Glucose 98 mg/dL      Calcium 9 5 mg/dL      AST 15 U/L      ALT 25 U/L      Alkaline Phosphatase 103 U/L      Total Protein 7 4 g/dL      Albumin 3 6 g/dL      Total Bilirubin 0 20 mg/dL      eGFR 106 ml/min/1 73sq m     Narrative:         National Kid[employer] Disease Education Program recommendations are as follows:  GFR calculation is accurate only with a steady state creatinine  Chronic Kidney disease less than 60 ml/min/1 73 sq  meters  Kidney failure less than 15 ml/min/1 73 sq  meters  POCT pregnancy, urine [[id number]]  (Normal) Resulted:  09/01/18 0754    Lab Status:  Final result Updated:  09/01/18 0754     EXT PREG TEST UR (Ref: Negative) negative                 No orders to display              Procedures  Procedures       Phone Contacts  ED Phone Contact    ED Course                               MDM  CritCare Time    Disposition  Final diagnoses:   DUB (dysfunctional uterine bleeding)     Time reflects when diagnosis was documented in both MDM as applicable and the Disposition within this note     Time User Action Codes Description Comment    9/1/2018 10:02 AM Jojo Jimenez Add [N93 8] DUB (dysfunctional uterine bleeding)       ED Disposition     ED Disposition Condition Comment    Discharge  Navis Curet discharge to home/self care      Condition at discharge: Good        Follow-up Information     Follow up With Specialties Details Why [address], [address] Medicine [address], [facility]          Discharge Medication List as of 9/1/2018 10:03 AM      CONTINUE these medications which have NOT CHANGED    Details   diphenhydrAMINE (BENADRYL) 25 mg capsule Take 1 capsule (25 mg total) by mouth every 6 (six) hours as needed for itching, Starting Sat 7/14/2018, Print      ferrous sulfate 324 (65 Fe) mg Take 1 tablet (324 mg total) by mouth 2 (two) times a day before meals, Starting Tue 8/14/2018, Normal      !! naproxen (NAPROSYN) 375 mg tablet Take 1 tablet (375 mg total) by mouth 2 (two) times a day with meals, Starting Mon 8/6/2018, Normal      !! naproxen (NAPROSYN) 375 mg tablet Take 1 tablet (375 mg total) by mouth 2 (two) times a day with meals, Starting Mon 8/6/2018, Print      norethindrone (AYGESTIN) 5 mg tablet Take 1 tab orally each day fom 1st-14th each month, Print       !! - Potential duplicate medications found  Please discuss with provider  No discharge procedures on file      ED Provider  Electronically Signed by           Sandy Cat PA-C  09/02/18 8615

## 2018-09-02 LAB
CANDIDA RRNA VAG QL PROBE: NEGATIVE
G VAGINALIS RRNA GENITAL QL PROBE: NEGATIVE
T VAGINALIS RRNA GENITAL QL PROBE: NEGATIVE

## 2018-09-04 LAB
CHLAMYDIA DNA CVX QL NAA+PROBE: NORMAL
N GONORRHOEA DNA GENITAL QL NAA+PROBE: NORMAL

## 2018-09-05 RX ORDER — METHOCARBAMOL 500 MG/1
500 TABLET, FILM COATED ORAL 2 TIMES DAILY
Refills: 0 | COMMUNITY
Start: 2018-08-07 | End: 2018-09-10

## 2018-09-06 ENCOUNTER — TELEPHONE (OUTPATIENT)
Dept: HEMATOLOGY ONCOLOGY | Facility: CLINIC | Age: 53
End: 2018-09-06

## 2018-09-06 NOTE — TELEPHONE ENCOUNTER
I have been tried to talk to the patient to ask her the phone number of the hospital in [place] where she went hospitalized  I finally talked to her today  She was going to work and said that she will call us back tomorrow and provide the phone number  Please let me talk to her when she calls or just take note of the number for me  Thanks! Andie Garibay

## 2018-09-08 PROBLEM — H53.8 BLURRY VISION: Status: ACTIVE | Noted: 2017-09-06

## 2018-09-08 PROBLEM — D64.9 ANEMIA: Status: ACTIVE | Noted: 2018-02-22

## 2018-09-08 PROBLEM — N60.91 INTRADUCTAL HYPERPLASIA WITHOUT ATYPIA OF RIGHT BREAST: Status: ACTIVE | Noted: 2017-08-29

## 2018-09-08 PROBLEM — N93.9 ABNORMAL UTERINE BLEEDING: Status: ACTIVE | Noted: 2018-02-22

## 2018-09-08 NOTE — PROGRESS NOTES
[place] [facility]  FAMILY PRACTICE OFFICE VISIT       NAME: Harshil Sanderson  AGE: 47 y o  SEX: female       : 1965        MRN: [id number]    DATE: 9/10/2018  TIME: 7:15 PM    Assessment and Plan     Problem List Items Addressed This Visit     Abnormal uterine bleeding       Patient notes that this has been well controlled on her current oral contraceptive  She will continue to follow with Gynecology  Thyroid enlargement      Reviewed with the patient that her right side of the thyroid felt slightly enlarged  Will send for ultrasound and labs to further evaluate  Relevant Orders    US thyroid    TSH, 3rd generation with Free T4 reflex      Other Visit Diagnoses     Well adult exam    -  Primary    Relevant Orders    Ambulatory referral to Gynecology    Encounter for screening mammogram for breast cancer        Relevant Orders    Mammo screening bilateral w cad    Need for shingles vaccine        Relevant Medications    Zoster Vac Recomb Adjuvanted (SHINGRIX) 50 MCG SUSR    Decreased visual acuity          Patient notes some blurry vision  She was referred to Ophthalmology for further evaluation  Relevant Orders    Ambulatory referral to Ophthalmology    Flu vaccine need        Relevant Orders    influenza vaccine, [id number], quadrivalent, recombinant, PF, 0 5 mL, for patients 18 yr+ (FLUBLOK) (Completed)    Need for Tdap vaccination        Relevant Orders    TDAP VACCINE GREATER THAN OR EQUAL TO 6YO IM (Completed)          Iron deficiency anemia due to chronic blood loss   We reviewed that her hemoglobin on recent labs done earlier this month showed a normal level of 13  Will continue to monitor  Abnormal uterine bleeding    Patient notes that this has been well controlled on her current oral contraceptive  She will continue to follow with Gynecology  Thyroid enlargement   Reviewed with the patient that her right side of the thyroid felt slightly enlarged    Will send for ultrasound and labs to further evaluate  The patient presented today for health maintenance visit  She currently eats a well-balanced  diet  She has no  exercise regimen  She was encouraged to improve exercise with a goal of at least 150 minutes of exercise weekly  For breast cancer screening, mammogram was ordered  For colorectal cancer screening, she will be going for her colonoscopy later this month  Screening labs were reviewed  Immunizations are not up-to-date, Tdap (Boostrix/Adacel) given today, Shingrix script provided to patient today and influenza vaccine given today  Advice and education were given regarding exercise  Chief Complaint     Chief Complaint   Patient presents with    Physical Exam    Follow-up     [facility] ED        History of Present Illness   Harshil Sanderson is a 47y o -year-old female who presents for yearly health maintenance visit  HM, Adult Female: The patient is being seen for health maintenance evaluation  General Health: The patient's health since the last visit is described as good besides her vaginal bleeding  She has had no recent dental visits  She confirms vision problems - having blurriness - no recent eye exam   She denies hearing loss  Immunizations are not up-to-date, Tdap (Boostrix/Adacel) is due, Shingrix is due and influenza vaccine is due  Lifestyle:  She describes her diet as a well-balanced  She reports no exercise routine  She denies tobacco use  She denies alcohol use  She denies drug use  Reproductive Health: The patient is meredith-menopausal and having a lot of bleeding - better with Micronor  Screening:  Her most recent cervical cancer screening was about 1 year ago  Her last breast cancer screening was a mammogram on 9/26/17  She does regular self breast exams  Colorectal cancer screening was never but is scheduled for this month     Metabolic screening includes lipid panel done 9/2017, glucose screening done 9/2018  Safety Screening:  She confirms wearing seatbelts, confirms having smoke and carbon monoxide detectors, and confirms wearing sunscreen  She denies domestic violence  The patient does have a history of uterine fibroids with heavy menstrual cycle 6 and iron deficiency anemia  She does follow with Gynecology  She is on norethindrone which has been helpful  Her last CBC was done just over a week ago and did not show any anemia  Her hemoglobin was 13  She also has a history of intraductal hyperplasia without atypia of the right breast   Her last mammogram was done in 09/26/2017 and was okay  Review of Systems   Review of Systems   Constitutional: Negative for chills and fever  HENT: Negative for rhinorrhea and sore throat  Eyes: Negative for visual disturbance  Respiratory: Negative for cough, shortness of breath and wheezing  Cardiovascular: Negative for chest pain, palpitations and leg swelling  Gastrointestinal: Negative for abdominal pain, constipation, diarrhea, nausea and vomiting  Endocrine: Negative for polydipsia and polyuria  Genitourinary: Positive for frequency (only 4-5 times a day)  Negative for dysuria  Musculoskeletal: Negative for arthralgias and myalgias  Skin: Negative for rash  Neurological: Negative for dizziness, syncope and headaches  Psychiatric/Behavioral: Negative for dysphoric mood  The patient is not nervous/anxious          Active Problem List     Patient Active Problem List   Diagnosis    Iron deficiency anemia due to chronic blood loss    Menorrhagia with regular cycle    Uterine fibroid    History of positive PPD    RBC microcytosis    Encounter for screening colonoscopy    Abnormal uterine bleeding    Anemia    Blurry vision    Intraductal hyperplasia without atypia of right breast    Thyroid enlargement         Past Medical History:  Past Medical History:   Diagnosis Date    Anemia     Intraductal hyperplasia without atypia of right breast 02/2016    c florid and sclerosing adenosis, background dense stromal fibrosis    Iron deficiency anemia due to chronic blood loss     transfused 2015 9/17: ferritin 2   H/H 8 8/31 7%    Menorrhagia with regular cycle     Varicella     without complication       Past Surgical History:  Past Surgical History:   Procedure Laterality Date    BREAST BIOPSY Right 02/2016    intraductal hyperplasia without atypia,fibroid and sclerosing adenosis, and columnar cell change in a bsckround of dense stromal fibrosis no malignancy identified    TUBAL LIGATION         Family History:  Family History   Problem Relation Age of Onset   Levora Zamora Breast cancer Mother         52's passed 78 yo c mets    Anemia Mother     Thyroid disease Mother         total thyroidectomy uncertain reason    Hyperlipidemia Father     Deep vein thrombosis Neg Hx     Mental illness Neg Hx     Hypertension Neg Hx     Coronary artery disease Neg Hx     Diabetes type II Neg Hx        Social History:  Social History     Social History    Marital status: /Civil Union     Spouse name: N/A    Number of children: N/A    Years of education: N/A     Occupational History    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      Social History Main Topics    Smoking status: Never Smoker    Smokeless tobacco: Never Used    Alcohol use No      Comment: rare    Drug use: No    Sexual activity: Not on file     Other Topics Concern    Not on file     Social History Narrative    ** Merged History Encounter **    Inadequate exercise    [employer]                Objective     Vitals:    09/10/18 1550   BP: 122/70   BP Location: Left arm   Patient Position: Sitting   Cuff Size: Standard   Pulse: 82   Temp: 98 2 °F (36 8 °C)   SpO2: 97%   Weight: 65 3 kg (144 lb)   Height: 5' 0 6" (1 539 m)     Wt Readings from Last 3 Encounters:   09/10/18 65 3 kg (144 lb)   09/01/18 66 7 kg (147 lb)   08/14/18 66 kg (145 lb 9 6 oz) Physical Exam   Constitutional: She appears well-developed and well-nourished  HENT:   Head: Normocephalic and atraumatic  Right Ear: Hearing, tympanic membrane, external ear and ear canal normal    Left Ear: Hearing, tympanic membrane, external ear and ear canal normal    Nose: Nose normal    Mouth/Throat: Oropharynx is clear and moist and mucous membranes are normal    Eyes: Conjunctivae and lids are normal  Pupils are equal, round, and reactive to light  Neck: Trachea normal and normal range of motion  Neck supple  Carotid bruit is not present  Thyromegaly (slight right sided enlargement) present  No thyroid mass present  Cardiovascular: Normal rate, regular rhythm, S1 normal, S2 normal, normal heart sounds and intact distal pulses  No murmur heard  Pulses:       Radial pulses are 2+ on the right side, and 2+ on the left side  Posterior tibial pulses are 2+ on the right side, and 2+ on the left side  Pulmonary/Chest: Effort normal and breath sounds normal  She has no decreased breath sounds  She has no wheezes  She has no rhonchi  She has no rales  Abdominal: Soft  Normal appearance and bowel sounds are normal  She exhibits no distension and no mass  There is no hepatosplenomegaly  There is no tenderness  No hernia  Musculoskeletal: Normal range of motion  She exhibits no edema  Lymphadenopathy:     She has no cervical adenopathy  Neurological: She is alert  She has normal strength  No sensory deficit (light touch sensation intact and equal in UE and LE bilaterally)  Skin: Skin is warm and dry  No rash noted  Psychiatric: She has a normal mood and affect  Her behavior is normal    Vitals reviewed        Pertinent Laboratory/Diagnostic Studies:  Lab Results   Component Value Date    BUN 9 09/01/2018    CREATININE 0 58 (L) 09/01/2018    CALCIUM 9 5 09/01/2018     09/01/2018    K 3 9 09/01/2018    CO2 24 09/01/2018     09/01/2018     Lab Results   Component Value Date    ALT 25 09/01/2018    AST 15 09/01/2018    ALKPHOS 103 09/01/2018       Lab Results   Component Value Date    WBC 12 50 (H) 09/01/2018    HGB 13 0 09/01/2018    HCT 39 1 09/01/2018    MCV 88 09/01/2018     09/01/2018     Lab Results   Component Value Date    TRIG 167 (H) 09/06/2017     Lab Results   Component Value Date    HDL 53 09/06/2017     Lab Results   Component Value Date    LDLCALC 98 09/06/2017     Results for orders placed or performed during the hospital encounter of 09/01/18   Chlamydia/GC amplified DNA by PCR   Result Value Ref Range    N gonorrhoeae, DNA Probe N  gonorrhoeae Amplified DNA Negative N  gonorrhoeae Amplified DNA Negative    Chlamydia, DNA Probe C  trachomatis Amplified DNA Negative C  trachomatis Amplified DNA Negative   VAGINOSIS DNA PROBE (AFFIRM)   Result Value Ref Range    Candida Species Negative Negative    Gardnerella vaginalis Negative Negative    Trichomonas vaginalis Negative Negative   UA w Reflex to Microscopic w Reflex to Culture   Result Value Ref Range    Color, UA Red (A) Straw, Yellow, Pale Yellow    Clarity, UA Cloudy (A) Clear, Other    Specific [place], UA 1 010 1 003 - 1 040    pH, UA 8 0 4 5 - 8 0    Leukocytes, UA Negative Negative    Nitrite, UA Negative Negative    Protein, UA >=500 (A) Negative mg/dl    Glucose, UA Negative Negative mg/dl    Ketones, UA Negative Negative mg/dl    Bilirubin, UA Negative Negative    Blood,  0 (A) Negative    UROBILINOGEN UA Negative 1 0, Negative mg/dL   CBC and differential   Result Value Ref Range    WBC 12 50 (H) 4 50 - 11 00 Thousand/uL    RBC 4 43 4 00 - 5 20 Million/uL    Hemoglobin 13 0 12 0 - 16 0 g/dL    Hematocrit 39 1 36 0 - 46 0 %    MCV 88 80 - 100 fL    MCH 29 3 26 0 - 34 0 pg    MCHC 33 2 31 0 - 36 0 g/dL    RDW 14 0 <15 3 %    MPV 9 1 8 9 - 12 7 fL    Platelets 707 355 - 197 Thousands/uL    Neutrophils Relative 72 (H) 45 - 65 %    Lymphocytes Relative 21 20 - 50 %    Monocytes Relative 6 1 - 10 % Eosinophils Relative 1 0 - 6 %    Basophils Relative 1 0 - 1 %    Neutrophils Absolute 8 90 (H) 1 80 - 7 80 Thousands/µL    Lymphocytes Absolute 2 60 0 50 - 4 00 Thousands/µL    Monocytes Absolute 0 70 0 20 - 0 90 Thousand/µL    Eosinophils Absolute 0 10 0 00 - 0 40 Thousand/µL    Basophils Absolute 0 10 0 00 - 0 10 Thousands/µL   Comprehensive metabolic panel   Result Value Ref Range    Sodium 138 137 - 147 mmol/L    Potassium 3 9 3 6 - 5 0 mmol/L    Chloride 108 97 - 108 mmol/L    CO2 24 22 - 30 mmol/L    ANION GAP 6 5 - 14 mmol/L    BUN 9 5 - 25 mg/dL    Creatinine 0 58 (L) 0 60 - 1 20 mg/dL    Glucose 98 70 - 99 mg/dL    Calcium 9 5 8 4 - 10 2 mg/dL    AST 15 14 - 36 U/L    ALT 25 9 - 52 U/L    Alkaline Phosphatase 103 43 - 122 U/L    Total Protein 7 4 5 9 - 8 4 g/dL    Albumin 3 6 3 0 - 5 2 g/dL    Total Bilirubin 0 20 <1 30 mg/dL    eGFR 106 >60 ml/min/1 73sq m   Lipase   Result Value Ref Range    Lipase 69 23 - 300 u/L   Urine Microscopic   Result Value Ref Range    RBC, UA Innumerable (A) None Seen, 0-5 /hpf    WBC, UA 0-1 (A) None Seen, 0-5, 5-55, 5-65 /hpf    Epithelial Cells Occasional None Seen, Occasional /hpf    Bacteria, UA Occasional None Seen, Occasional /hpf   POCT pregnancy, urine   Result Value Ref Range    EXT PREG TEST UR (Ref: Negative) negative    Smear Review(Phlebs Do Not Order)   Result Value Ref Range    RBC Morphology Normal     Platelet Estimate Adequate Adequate       Orders Placed This Encounter   Procedures    Mammo screening bilateral w cad    US thyroid    TDAP VACCINE GREATER THAN OR EQUAL TO 6YO IM    influenza vaccine, [id number], quadrivalent, recombinant, PF, 0 5 mL, for patients 18 yr+ (FLUBLOK)    TSH, 3rd generation with Free T4 reflex    Ambulatory referral to Ophthalmology    Ambulatory referral to Gynecology       ALLERGIES:  No Known Allergies    Current Medications     Current Outpatient Prescriptions   Medication Sig Dispense Refill    diphenhydrAMINE (BENADRYL) 25 mg capsule Take 1 capsule (25 mg total) by mouth every 6 (six) hours as needed for itching 20 capsule 0    ferrous sulfate 324 (65 Fe) mg Take 1 tablet (324 mg total) by mouth 2 (two) times a day before meals 60 tablet 5    ibuprofen (MOTRIN) 600 mg tablet Take 1 tablet (600 mg total) by mouth every 6 (six) hours as needed (pain) 30 tablet 0    naproxen (NAPROSYN) 375 mg tablet Take 1 tablet (375 mg total) by mouth 2 (two) times a day with meals 20 tablet 0    norethindrone (JENCYCLA) 0 35 MG tablet Take 1 tablet by mouth daily      Zoster Vac Recomb Adjuvanted (SHINGRIX) 50 MCG SUSR Inject 50 mcg into a muscle once for 1 dose 1 each 0     No current facility-administered medications for this visit            Health Maintenance     Health Maintenance   Topic Date Due    CRC Screening: Colonoscopy  1965    DTaP,Tdap,and Td Vaccines (1 - Tdap) 01/09/1987    INFLUENZA VACCINE  09/01/2018    MAMMOGRAM  09/26/2018    Depression Screening PHQ  05/31/2019     Immunization History   Administered Date(s) Administered    Hep A / Hep B 06/04/2004, 07/09/2004, 02/11/2005    Influenza Quadrivalent Preservative Free 3 years and older IM 09/06/2017    Influenza, recombinant, quadrivalent,injectable, preservative free 09/10/2018    Tdap 09/10/2018       Marcy Vásquez PA-C  9/10/2018 7:15 PM  [facility]

## 2018-09-10 ENCOUNTER — OFFICE VISIT (OUTPATIENT)
Dept: FAMILY MEDICINE CLINIC | Facility: CLINIC | Age: 53
End: 2018-09-10
Payer: OTHER MISCELLANEOUS

## 2018-09-10 VITALS
WEIGHT: 144 LBS | SYSTOLIC BLOOD PRESSURE: 122 MMHG | HEART RATE: 82 BPM | HEIGHT: 61 IN | BODY MASS INDEX: 27.19 KG/M2 | TEMPERATURE: 98.2 F | DIASTOLIC BLOOD PRESSURE: 70 MMHG | OXYGEN SATURATION: 97 %

## 2018-09-10 DIAGNOSIS — H54.7 DECREASED VISUAL ACUITY: ICD-10-CM

## 2018-09-10 DIAGNOSIS — Z12.31 ENCOUNTER FOR SCREENING MAMMOGRAM FOR BREAST CANCER: ICD-10-CM

## 2018-09-10 DIAGNOSIS — Z23 FLU VACCINE NEED: ICD-10-CM

## 2018-09-10 DIAGNOSIS — N93.9 ABNORMAL UTERINE BLEEDING: ICD-10-CM

## 2018-09-10 DIAGNOSIS — Z23 NEED FOR SHINGLES VACCINE: ICD-10-CM

## 2018-09-10 DIAGNOSIS — Z00.00 WELL ADULT EXAM: Primary | ICD-10-CM

## 2018-09-10 DIAGNOSIS — Z23 NEED FOR TDAP VACCINATION: ICD-10-CM

## 2018-09-10 DIAGNOSIS — E04.9 THYROID ENLARGEMENT: ICD-10-CM

## 2018-09-10 PROCEDURE — 90715 TDAP VACCINE 7 YRS/> IM: CPT

## 2018-09-10 PROCEDURE — 90682 RIV4 VACC RECOMBINANT DNA IM: CPT

## 2018-09-10 PROCEDURE — 99396 PREV VISIT EST AGE 40-64: CPT | Performed by: PHYSICIAN ASSISTANT

## 2018-09-10 PROCEDURE — 90472 IMMUNIZATION ADMIN EACH ADD: CPT

## 2018-09-10 PROCEDURE — 90471 IMMUNIZATION ADMIN: CPT

## 2018-09-10 RX ORDER — ACETAMINOPHEN AND CODEINE PHOSPHATE 120; 12 MG/5ML; MG/5ML
1 SOLUTION ORAL DAILY
COMMUNITY
End: 2020-01-16

## 2018-09-10 NOTE — LETTER
September 10, 2018     Patient: Franc Friend   YOB: 1965   Date of Visit: 9/10/2018       To Whom it May Concern:    Harshil Sanderson is under my professional care  She was seen in my office on 9/10/2018  She may return to work on 9/11/18  If you have any questions or concerns, please don't hesitate to call           Sincerely,          Erasto Mcgee PA-C        CC: No Recipients

## 2018-09-10 NOTE — ASSESSMENT & PLAN NOTE
Reviewed with the patient that her right side of the thyroid felt slightly enlarged  Will send for ultrasound and labs to further evaluate

## 2018-09-10 NOTE — ASSESSMENT & PLAN NOTE
Patient notes that this has been well controlled on her current oral contraceptive  She will continue to follow with Gynecology

## 2018-09-10 NOTE — ASSESSMENT & PLAN NOTE
We reviewed that her hemoglobin on recent labs done earlier this month showed a normal level of 13  Will continue to monitor

## 2018-09-14 ENCOUNTER — APPOINTMENT (OUTPATIENT)
Dept: URGENT CARE | Age: 53
End: 2018-09-14
Payer: OTHER MISCELLANEOUS

## 2018-09-14 PROCEDURE — 99214 OFFICE O/P EST MOD 30 MIN: CPT | Performed by: PREVENTIVE MEDICINE

## 2018-09-15 ENCOUNTER — HOSPITAL ENCOUNTER (EMERGENCY)
Facility: HOSPITAL | Age: 53
Discharge: HOME | End: 2018-09-15
Attending: EMERGENCY MEDICINE | Admitting: EMERGENCY MEDICINE
Payer: COMMERCIAL

## 2018-09-15 VITALS
DIASTOLIC BLOOD PRESSURE: 85 MMHG | OXYGEN SATURATION: 99 % | RESPIRATION RATE: 18 BRPM | SYSTOLIC BLOOD PRESSURE: 131 MMHG | WEIGHT: 145 LBS | BODY MASS INDEX: 27.76 KG/M2 | HEART RATE: 71 BPM | TEMPERATURE: 97.4 F

## 2018-09-15 DIAGNOSIS — N93.8 DUB (DYSFUNCTIONAL UTERINE BLEEDING): Primary | ICD-10-CM

## 2018-09-15 LAB
ALBUMIN SERPL BCP-MCNC: 4.4 G/DL (ref 3–5.2)
ALP SERPL-CCNC: 112 U/L (ref 43–122)
ALT SERPL W P-5'-P-CCNC: 22 U/L (ref 9–52)
ANION GAP SERPL CALCULATED.3IONS-SCNC: 10 MMOL/L (ref 5–14)
AST SERPL W P-5'-P-CCNC: 20 U/L (ref 14–36)
B-HCG SERPL-ACNC: <3 MIU/ML
BACTERIA UR QL AUTO: ABNORMAL /HPF
BASOPHILS # BLD AUTO: 0.1 THOUSANDS/ΜL (ref 0–0.1)
BASOPHILS NFR BLD AUTO: 1 % (ref 0–1)
BILIRUB SERPL-MCNC: 0.2 MG/DL
BILIRUB UR QL STRIP: NEGATIVE
BUN SERPL-MCNC: 9 MG/DL (ref 5–25)
CALCIUM SERPL-MCNC: 10.4 MG/DL (ref 8.4–10.2)
CHLORIDE SERPL-SCNC: 105 MMOL/L (ref 97–108)
CLARITY UR: CLEAR
CO2 SERPL-SCNC: 27 MMOL/L (ref 22–30)
COLOR UR: ABNORMAL
CREAT SERPL-MCNC: 0.81 MG/DL (ref 0.6–1.2)
EOSINOPHIL # BLD AUTO: 0.4 THOUSAND/ΜL (ref 0–0.4)
EOSINOPHIL NFR BLD AUTO: 3 % (ref 0–6)
ERYTHROCYTE [DISTWIDTH] IN BLOOD BY AUTOMATED COUNT: 13.8 %
EXT PREG TEST URINE: NEGATIVE
GFR SERPL CREATININE-BSD FRML MDRD: 84 ML/MIN/1.73SQ M
GLUCOSE SERPL-MCNC: 103 MG/DL (ref 70–99)
GLUCOSE UR STRIP-MCNC: NEGATIVE MG/DL
HCT VFR BLD AUTO: 42.5 % (ref 36–46)
HGB BLD-MCNC: 14.1 G/DL (ref 12–16)
HGB UR QL STRIP.AUTO: 250
KETONES UR STRIP-MCNC: NEGATIVE MG/DL
LEUKOCYTE ESTERASE UR QL STRIP: NEGATIVE
LIPASE SERPL-CCNC: 113 U/L (ref 23–300)
LYMPHOCYTES # BLD AUTO: 3.5 THOUSANDS/ΜL (ref 0.5–4)
LYMPHOCYTES NFR BLD AUTO: 28 % (ref 20–50)
MCH RBC QN AUTO: 29.7 PG (ref 26–34)
MCHC RBC AUTO-ENTMCNC: 33.1 G/DL (ref 31–36)
MCV RBC AUTO: 90 FL (ref 80–100)
MONOCYTES # BLD AUTO: 0.8 THOUSAND/ΜL (ref 0.2–0.9)
MONOCYTES NFR BLD AUTO: 6 % (ref 1–10)
NEUTROPHILS # BLD AUTO: 7.9 THOUSANDS/ΜL (ref 1.8–7.8)
NEUTS SEG NFR BLD AUTO: 63 % (ref 45–65)
NITRITE UR QL STRIP: NEGATIVE
NON-SQ EPI CELLS URNS QL MICRO: ABNORMAL /HPF
PH UR STRIP.AUTO: 6 [PH] (ref 4.5–8)
PLATELET # BLD AUTO: 367 THOUSANDS/UL (ref 150–450)
PMV BLD AUTO: 8.9 FL (ref 8.9–12.7)
POTASSIUM SERPL-SCNC: 3.8 MMOL/L (ref 3.6–5)
PROT SERPL-MCNC: 8.7 G/DL (ref 5.9–8.4)
PROT UR STRIP-MCNC: NEGATIVE MG/DL
RBC # BLD AUTO: 4.74 MILLION/UL (ref 4–5.2)
RBC #/AREA URNS AUTO: ABNORMAL /HPF
SODIUM SERPL-SCNC: 142 MMOL/L (ref 137–147)
SP GR UR STRIP.AUTO: 1.01 (ref 1–1.04)
UROBILINOGEN UA: NEGATIVE MG/DL
WBC # BLD AUTO: 12.6 THOUSAND/UL (ref 4.5–11)
WBC #/AREA URNS AUTO: ABNORMAL /HPF

## 2018-09-15 PROCEDURE — 85025 COMPLETE CBC W/AUTO DIFF WBC: CPT | Performed by: PHYSICIAN ASSISTANT

## 2018-09-15 PROCEDURE — 81025 URINE PREGNANCY TEST: CPT | Performed by: PHYSICIAN ASSISTANT

## 2018-09-15 PROCEDURE — 81001 URINALYSIS AUTO W/SCOPE: CPT | Performed by: PHYSICIAN ASSISTANT

## 2018-09-15 PROCEDURE — 83690 ASSAY OF LIPASE: CPT | Performed by: PHYSICIAN ASSISTANT

## 2018-09-15 PROCEDURE — 87591 N.GONORRHOEAE DNA AMP PROB: CPT | Performed by: PHYSICIAN ASSISTANT

## 2018-09-15 PROCEDURE — 96361 HYDRATE IV INFUSION ADD-ON: CPT

## 2018-09-15 PROCEDURE — 87660 TRICHOMONAS VAGIN DIR PROBE: CPT | Performed by: PHYSICIAN ASSISTANT

## 2018-09-15 PROCEDURE — 87510 GARDNER VAG DNA DIR PROBE: CPT | Performed by: PHYSICIAN ASSISTANT

## 2018-09-15 PROCEDURE — 81003 URINALYSIS AUTO W/O SCOPE: CPT | Performed by: PHYSICIAN ASSISTANT

## 2018-09-15 PROCEDURE — 80053 COMPREHEN METABOLIC PANEL: CPT | Performed by: PHYSICIAN ASSISTANT

## 2018-09-15 PROCEDURE — 36415 COLL VENOUS BLD VENIPUNCTURE: CPT | Performed by: PHYSICIAN ASSISTANT

## 2018-09-15 PROCEDURE — 87480 CANDIDA DNA DIR PROBE: CPT | Performed by: PHYSICIAN ASSISTANT

## 2018-09-15 PROCEDURE — 84702 CHORIONIC GONADOTROPIN TEST: CPT | Performed by: PHYSICIAN ASSISTANT

## 2018-09-15 PROCEDURE — 99284 EMERGENCY DEPT VISIT MOD MDM: CPT

## 2018-09-15 PROCEDURE — 96374 THER/PROPH/DIAG INJ IV PUSH: CPT

## 2018-09-15 PROCEDURE — 87491 CHLMYD TRACH DNA AMP PROBE: CPT | Performed by: PHYSICIAN ASSISTANT

## 2018-09-15 RX ORDER — ACETAMINOPHEN 325 MG/1
650 TABLET ORAL ONCE
Status: COMPLETED | OUTPATIENT
Start: 2018-09-15 | End: 2018-09-15

## 2018-09-15 RX ORDER — ACETAMINOPHEN 325 MG/1
TABLET ORAL
Status: COMPLETED
Start: 2018-09-15 | End: 2018-09-15

## 2018-09-15 RX ORDER — KETOROLAC TROMETHAMINE 30 MG/ML
INJECTION, SOLUTION INTRAMUSCULAR; INTRAVENOUS
Status: COMPLETED
Start: 2018-09-15 | End: 2018-09-15

## 2018-09-15 RX ORDER — KETOROLAC TROMETHAMINE 30 MG/ML
30 INJECTION, SOLUTION INTRAMUSCULAR; INTRAVENOUS ONCE
Status: COMPLETED | OUTPATIENT
Start: 2018-09-15 | End: 2018-09-15

## 2018-09-15 RX ORDER — SODIUM CHLORIDE 9 MG/ML
250 INJECTION, SOLUTION INTRAVENOUS CONTINUOUS
Status: DISCONTINUED | OUTPATIENT
Start: 2018-09-15 | End: 2018-09-15 | Stop reason: HOSPADM

## 2018-09-15 RX ORDER — IBUPROFEN 600 MG/1
600 TABLET ORAL EVERY 6 HOURS PRN
Qty: 30 TABLET | Refills: 0 | Status: SHIPPED | OUTPATIENT
Start: 2018-09-15 | End: 2018-10-18 | Stop reason: SDUPTHER

## 2018-09-15 RX ADMIN — ACETAMINOPHEN 650 MG: 325 TABLET ORAL at 17:53

## 2018-09-15 RX ADMIN — KETOROLAC TROMETHAMINE 30 MG: 30 INJECTION, SOLUTION INTRAMUSCULAR; INTRAVENOUS at 17:54

## 2018-09-15 RX ADMIN — SODIUM CHLORIDE 250 ML/HR: 9 INJECTION, SOLUTION INTRAVENOUS at 16:53

## 2018-09-15 NOTE — ED PROVIDER NOTES
History  Chief Complaint   Patient presents with    Vaginal Bleeding     "I was here 2 wks ago for the same thing but I am still having alot of bleeding from my vagina and pain in my abdomen  I seen my OB who gave me pills to control the bleeding but I guess they're not working"       History provided by:  Patient   used: No    Medical Problem   Location:  Pt with return of vaginal bleeding and pelvis pain 2 days ago   bleeding from prior er visit had stopped pt did not follow up with ob gyn doctor   Severity:  Mild  Onset quality:  Gradual  Duration:  2 days  Timing:  Constant  Progression:  Unchanged  Chronicity:  New  Associated symptoms: abdominal pain    Associated symptoms: no chest pain, no congestion, no cough, no diarrhea, no ear pain, no fatigue, no fever, no headaches, no loss of consciousness, no myalgias, no nausea, no rash, no rhinorrhea, no shortness of breath, no sore throat, no vomiting and no wheezing        Prior to Admission Medications   Prescriptions Last Dose Informant Patient Reported? Taking?   ferrous sulfate 324 (65 Fe) mg   No No   Sig: Take 1 tablet (324 mg total) by mouth 2 (two) times a day before meals   ibuprofen (MOTRIN) 600 mg tablet   No No   Sig: Take 1 tablet (600 mg total) by mouth every 6 (six) hours as needed (pain)   naproxen (NAPROSYN) 375 mg tablet  Self No No   Sig: Take 1 tablet (375 mg total) by mouth 2 (two) times a day with meals   norethindrone (JENCYCLA) 0 35 MG tablet   Yes No   Sig: Take 1 tablet by mouth daily      Facility-Administered Medications: None       Past Medical History:   Diagnosis Date    Anemia     Disease of thyroid gland     enlarged thyroid    Intraductal hyperplasia without atypia of right breast 02/2016    c florid and sclerosing adenosis, background dense stromal fibrosis    Iron deficiency anemia due to chronic blood loss     transfused 2015 9/17: ferritin 2   H/H 8 8/31 7%    Menorrhagia with regular cycle     Varicella     without complication       Past Surgical History:   Procedure Laterality Date    BREAST BIOPSY Right 02/2016    intraductal hyperplasia without atypia,fibroid and sclerosing adenosis, and columnar cell change in a bsckround of dense stromal fibrosis no malignancy identified    TUBAL LIGATION         Family History   Problem Relation Age of Onset   [facility] Breast cancer Mother         52's passed 76 yo c mets    Anemia Mother     Thyroid disease Mother         total thyroidectomy uncertain reason    Hyperlipidemia Father     Deep vein thrombosis Neg Hx     Mental illness Neg Hx     Hypertension Neg Hx     Coronary artery disease Neg Hx     Diabetes type II Neg Hx      I have reviewed and agree with the history as documented  Social History   Substance Use Topics    Smoking status: Never Smoker    Smokeless tobacco: Never Used    Alcohol use No      Comment: rare        Review of Systems   Constitutional: Negative  Negative for fatigue and fever  HENT: Negative  Negative for congestion, ear pain, rhinorrhea and sore throat  Eyes: Negative  Respiratory: Negative  Negative for cough, shortness of breath and wheezing  Cardiovascular: Negative  Negative for chest pain  Gastrointestinal: Positive for abdominal pain  Negative for diarrhea, nausea and vomiting  Endocrine: Negative  Genitourinary: Negative  Musculoskeletal: Negative  Negative for myalgias  Skin: Negative  Negative for rash  Allergic/Immunologic: Negative  Neurological: Negative  Negative for loss of consciousness and headaches  Hematological: Negative  Psychiatric/Behavioral: Negative  All other systems reviewed and are negative  Physical Exam  Physical Exam   Constitutional: She is oriented to person, place, and time  She appears well-developed and well-nourished  HENT:   Head: Normocephalic and atraumatic     Right Ear: External ear normal    Left Ear: External ear normal    Nose: Nose normal    Mouth/Throat: Oropharynx is clear and moist    Eyes: Conjunctivae and EOM are normal  Pupils are equal, round, and reactive to light  Neck: Normal range of motion  Neck supple  Cardiovascular: Normal rate, regular rhythm and normal heart sounds  Pulmonary/Chest: Effort normal and breath sounds normal    Abdominal: Soft  Bowel sounds are normal    Suprapubic pressure    Genitourinary:   Genitourinary Comments: External wnl   Minor dark red blood from os    No d/c  Minor uterine tenderness    Musculoskeletal: Normal range of motion  Neurological: She is alert and oriented to person, place, and time  Skin: Skin is warm  Psychiatric: She has a normal mood and affect  Her behavior is normal    Nursing note and vitals reviewed  Vital Signs  ED Triage Vitals   Temperature Pulse Respirations Blood Pressure SpO2   09/15/18 1552 09/15/18 1552 09/15/18 1552 09/15/18 1552 09/15/18 1552   (!) 97 4 °F (36 3 °C) 72 18 118/68 99 %      Temp src Heart Rate Source Patient Position - Orthostatic VS BP Location FiO2 (%)   -- 09/15/18 1801 09/15/18 1801 09/15/18 1801 --    Monitor Lying Left arm       Pain Score       09/15/18 1753       Worst Possible Pain           Vitals:    09/15/18 1552 09/15/18 1801   BP: 118/68 131/85   Pulse: 72 71   Patient Position - Orthostatic VS:  Lying       Visual Acuity      ED Medications  Medications   acetaminophen (TYLENOL) tablet 650 mg (650 mg Oral Given 9/15/18 1753)   ketorolac (TORADOL) injection 30 mg (30 mg Intravenous Given 9/15/18 1754)       Diagnostic Studies  Results Reviewed     Procedure Component Value Units Date/Time    VAGINOSIS DNA PROBE (AFFIRM) [[id number]] Collected:  09/15/18 1735    Lab Status: In process Specimen:  Genital from Vaginal Updated:  09/15/18 1740    Chlamydia/GC amplified DNA by PCR [[id number]] Collected:  09/15/18 1735    Lab Status:   In process Specimen:  Vaginal Updated:  09/15/18 1740    hCG, quantitative [[id number]]  (Normal) Collected:  09/15/18 1649    Lab Status:  Final result Specimen:  Blood from Arm, Right Updated:  09/15/18 1721     HCG, Quant <3 mIU/mL     Narrative:         Pregnant Gestational Age           HCG Range (mIU/mL)  4 weeks                              44 - 6952  5 weeks                              348 - 62910        6 - 7 weeks                          975 - 468152  8 - 10 weeks                         40160 - 638660  11 - 12 weeks                        91310 - 394624  13 - 27 weeks (2nd Trimester)        6385 - 824559  28 - 40 weeks (3rd Trimester)        6263 - 398886                 Lipase [[id number]]  (Normal) Collected:  09/15/18 1649    Lab Status:  Final result Specimen:  Blood from Arm, Right Updated:  09/15/18 1708     Lipase 113 u/L     Comprehensive metabolic panel [[id number]]  (Abnormal) Collected:  09/15/18 1649    Lab Status:  Final result Specimen:  Blood from Arm, Right Updated:  09/15/18 1708     Sodium 142 mmol/L      Potassium 3 8 mmol/L      Chloride 105 mmol/L      CO2 27 mmol/L      ANION GAP 10 mmol/L      BUN 9 mg/dL      Creatinine 0 81 mg/dL      Glucose 103 (H) mg/dL      Calcium 10 4 (H) mg/dL      AST 20 U/L      ALT 22 U/L      Alkaline Phosphatase 112 U/L      Total Protein 8 7 (H) g/dL      Albumin 4 4 g/dL      Total Bilirubin 0 20 mg/dL      eGFR 84 ml/min/1 73sq m     Narrative:         National Kidney Disease Education Program recommendations are as follows:  GFR calculation is accurate only with a steady state creatinine  Chronic Kidney disease less than 60 ml/min/1 73 sq  meters  Kidney failure less than 15 ml/min/1 73 sq  meters      Urine Microscopic [[id number]]  (Abnormal) Collected:  09/15/18 1621    Lab Status:  Final result Specimen:  Urine from Urine, Clean Catch Updated:  09/15/18 1707     RBC, UA Innumerable (A) /hpf      WBC, UA 0-1 (A) /hpf      Epithelial Cells Occasional /hpf      Bacteria, UA None Seen /hpf     UA w Reflex to Microscopic w Reflex to Culture [[id number]]  (Abnormal) Collected:  09/15/18 1621    Lab Status:  Final result Specimen:  Urine from Urine, Clean Catch Updated:  09/15/18 1659     Color, UA Straw     Clarity, UA Clear     Specific Gravity, UA 1 010     pH, UA 6 0     Leukocytes, UA Negative     Nitrite, UA Negative     Protein, UA Negative mg/dl      Glucose, UA Negative mg/dl      Ketones, UA Negative mg/dl      Bilirubin, UA Negative     Blood,  0 (A)     UROBILINOGEN UA Negative mg/dL     CBC and differential [[id number]]  (Abnormal) Collected:  09/15/18 1649    Lab Status:  Final result Specimen:  Blood from Arm, Right Updated:  09/15/18 1659     WBC 12 60 (H) Thousand/uL      RBC 4 74 Million/uL      Hemoglobin 14 1 g/dL      Hematocrit 42 5 %      MCV 90 fL      MCH 29 7 pg      MCHC 33 1 g/dL      RDW 13 8 %      MPV 8 9 fL      Platelets 850 Thousands/uL      Neutrophils Relative 63 %      Lymphocytes Relative 28 %      Monocytes Relative 6 %      Eosinophils Relative 3 %      Basophils Relative 1 %      Neutrophils Absolute 7 90 (H) Thousands/µL      Lymphocytes Absolute 3 50 Thousands/µL      Monocytes Absolute 0 80 Thousand/µL      Eosinophils Absolute 0 40 Thousand/µL      Basophils Absolute 0 10 Thousands/µL     POCT pregnancy, urine [[id number]]  (Normal) Resulted:  09/15/18 1626    Lab Status:  Final result Updated:  09/15/18 1626     EXT PREG TEST UR (Ref: Negative) negative                 No orders to display              Procedures  Procedures       Phone Contacts  ED Phone Contact    ED Course                               MDM  CritCare Time    Disposition  Final diagnoses:   DUB (dysfunctional uterine bleeding)     Time reflects when diagnosis was documented in both MDM as applicable and the Disposition within this note     Time User Action Codes Description Comment    9/15/2018  5:48 PM Leo Guillermo Add [N93 8] DUB (dysfunctional uterine bleeding)       ED Disposition     ED Disposition Condition Comment    Discharge Harshil Sanderson discharge to home/self care  Condition at discharge: Good        Follow-up Information     Follow up With Specialties Details Why [facility] Ob[facility]etric[facility] and Gynecology Schedule an appointment as soon as possible for a visit  [address]  [address] [facility] [address] [phone]            Discharge Medication List as of 9/15/2018  5:50 PM      START taking these medications    Details   !! ibuprofen (MOTRIN) 600 mg tablet Take 1 tablet (600 mg total) by mouth every 6 (six) hours as needed (pain/bleeding), Starting Sat 9/15/2018, Print       !! - Potential duplicate medications found  Please discuss with provider  CONTINUE these medications which have NOT CHANGED    Details   ferrous sulfate 324 (65 Fe) mg Take 1 tablet (324 mg total) by mouth 2 (two) times a day before meals, Starting Tue 8/14/2018, Normal      !! ibuprofen (MOTRIN) 600 mg tablet Take 1 tablet (600 mg total) by mouth every 6 (six) hours as needed (pain), Starting Sat 9/1/2018, Print      naproxen (NAPROSYN) 375 mg tablet Take 1 tablet (375 mg total) by mouth 2 (two) times a day with meals, Starting Mon 8/6/2018, Print      norethindrone (JENCYCLA) 0 35 MG tablet Take 1 tablet by mouth daily, Historical Med       !! - Potential duplicate medications found  Please discuss with provider  No discharge procedures on file      ED Provider  Electronically Signed by           Juve Angela PA-C  09/15/18 0446

## 2018-09-17 LAB
CHLAMYDIA DNA CVX QL NAA+PROBE: NORMAL
N GONORRHOEA DNA GENITAL QL NAA+PROBE: NORMAL

## 2018-09-19 ENCOUNTER — ANESTHESIA EVENT (OUTPATIENT)
Dept: GASTROENTEROLOGY | Facility: HOSPITAL | Age: 53
End: 2018-09-19

## 2018-09-20 ENCOUNTER — ANESTHESIA (OUTPATIENT)
Dept: GASTROENTEROLOGY | Facility: HOSPITAL | Age: 53
End: 2018-09-20

## 2018-09-26 ENCOUNTER — APPOINTMENT (OUTPATIENT)
Dept: URGENT CARE | Age: 53
End: 2018-09-26
Payer: OTHER MISCELLANEOUS

## 2018-09-26 PROCEDURE — 99214 OFFICE O/P EST MOD 30 MIN: CPT | Performed by: PREVENTIVE MEDICINE

## 2018-10-12 ENCOUNTER — APPOINTMENT (OUTPATIENT)
Dept: URGENT CARE | Age: 53
End: 2018-10-12
Payer: OTHER MISCELLANEOUS

## 2018-10-12 PROCEDURE — 99213 OFFICE O/P EST LOW 20 MIN: CPT | Performed by: PREVENTIVE MEDICINE

## 2018-10-18 ENCOUNTER — OFFICE VISIT (OUTPATIENT)
Dept: FAMILY MEDICINE CLINIC | Facility: CLINIC | Age: 53
End: 2018-10-18
Payer: COMMERCIAL

## 2018-10-18 VITALS
HEART RATE: 104 BPM | OXYGEN SATURATION: 98 % | DIASTOLIC BLOOD PRESSURE: 80 MMHG | TEMPERATURE: 98.3 F | HEIGHT: 61 IN | WEIGHT: 144.25 LBS | SYSTOLIC BLOOD PRESSURE: 116 MMHG | BODY MASS INDEX: 27.23 KG/M2

## 2018-10-18 DIAGNOSIS — J06.9 VIRAL UPPER RESPIRATORY TRACT INFECTION: Primary | ICD-10-CM

## 2018-10-18 PROCEDURE — 99213 OFFICE O/P EST LOW 20 MIN: CPT | Performed by: PHYSICIAN ASSISTANT

## 2018-10-18 RX ORDER — BENZONATATE 100 MG/1
100-200 CAPSULE ORAL 3 TIMES DAILY PRN
Qty: 30 CAPSULE | Refills: 0 | Status: SHIPPED | OUTPATIENT
Start: 2018-10-18 | End: 2020-01-16

## 2018-10-18 RX ORDER — ALBUTEROL SULFATE 90 UG/1
2 AEROSOL, METERED RESPIRATORY (INHALATION) EVERY 4 HOURS PRN
Qty: 18 G | Refills: 0 | Status: SHIPPED | OUTPATIENT
Start: 2018-10-18 | End: 2020-01-16 | Stop reason: SDUPTHER

## 2018-10-18 RX ORDER — FLUTICASONE PROPIONATE 50 MCG
2 SPRAY, SUSPENSION (ML) NASAL DAILY
Qty: 16 G | Refills: 0 | Status: SHIPPED | OUTPATIENT
Start: 2018-10-18 | End: 2019-06-17 | Stop reason: CLARIF

## 2018-10-18 NOTE — LETTER
October 18, 2018     Patient: Leila Irby   YOB: 1965   Date of Visit: 10/18/2018       To Whom it May Concern:    Harshil Sanderson is under my professional care  She was seen in my office on 10/18/2018  She may return to work on 10/22/18  If you have any questions or concerns, please don't hesitate to call           Sincerely,          Nicolette Chavez PA-C        CC: No Recipients

## 2018-10-18 NOTE — PROGRESS NOTES
[facility]  FAMILY PRACTICE ACUTE OFFICE VISIT       NAME: Harshil Sanderson  AGE: 47 y o  SEX: female       : 1965        MRN: [id number]    DATE: 10/18/2018  TIME: 10:03 AM    Assessment and Plan     Problem List Items Addressed This Visit     None      Visit Diagnoses     Viral upper respiratory tract infection    -  Primary    Relevant Medications    benzonatate (TESSALON PERLES) 100 mg capsule    fluticasone (FLONASE) 50 mcg/act nasal spray    albuterol (VENTOLIN HFA) 90 mcg/act inhaler        Patient Instructions     I reviewed with the patient that she appears to have a viral upper respiratory infection  She was encouraged to increase fluids and rest   She was given benzonatate uses needed for cough suppression  She was also given an albuterol inhaler to use as needed for any chest tightness or wheezing  She was encouraged to start using 2 sprays of Flonase per nostril once daily to help with her nasal congestion  She was encouraged to call for symptoms are worsening or failing to improve over the next few days  We reviewed that viral upper respiratory infections usually takes 7-10 days to run their course; however, if she is worsening or not improving over that time frame, we should consider an antibiotic  She was given a note to excuse her from work tomorrow if she is not feeling up to going  She should call with any problems or concerns  Chief Complaint     Chief Complaint   Patient presents with    URI     coughing, headaches, running nose for 3 days        History of Present Illness   Harshil Sanderson is a 47y o -year-old female who presents for cold symptoms  URI    This is a new (not sure of any exposures) problem  Episode onset: ABOUT 3 DAYS  The problem has been gradually worsening  There has been no fever  Associated symptoms include congestion, coughing (slightly yellow), rhinorrhea and a sore throat (had it on the first day)   Pertinent negatives include no diarrhea, ear pain, nausea, vomiting or wheezing  She has tried acetaminophen and NSAIDs (and hot tea) for the symptoms  The treatment provided mild relief  Review of Systems   Review of Systems   Constitutional: Negative for chills and fever  HENT: Positive for congestion, rhinorrhea and sore throat (had it on the first day)  Negative for ear pain and sinus pressure  Respiratory: Positive for cough (slightly yellow) and chest tightness  Negative for shortness of breath and wheezing  Gastrointestinal: Negative for diarrhea, nausea and vomiting  Neurological: Negative for dizziness         Active Problem List     Patient Active Problem List   Diagnosis    Iron deficiency anemia due to chronic blood loss    Menorrhagia with regular cycle    Uterine fibroid    History of positive PPD    RBC microcytosis    Encounter for screening colonoscopy    Abnormal uterine bleeding    Anemia    Blurry vision    Intraductal hyperplasia without atypia of right breast    Thyroid enlargement         Social History:  Social History     Social History    Marital status: /Civil Union     Spouse name: N/A    Number of children: N/A    Years of education: N/A     Occupational History    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      Social History Main Topics    Smoking status: Never Smoker    Smokeless tobacco: Never Used    Alcohol use No      Comment: rare    Drug use: No    Sexual activity: Not on file     Other Topics Concern    Not on file     Social History Narrative    ** Merged History Encounter **    Inadequate exercise    [employer]                Objective     Vitals:    10/18/18 0935   BP: 116/80   BP Location: Left arm   Patient Position: Sitting   Cuff Size: Standard   Pulse: 104   Temp: 98 3 °F (36 8 °C)   SpO2: 98%   Weight: 65 4 kg (144 lb 4 oz)   Height: 5' 0 6" (1 539 m)     Wt Readings from Last 3 Encounters:   10/18/18 65 4 kg (144 lb 4 oz)   09/15/18 65 8 kg (145 lb)   09/10/18 65 3 kg (144 lb)       Physical Exam   Constitutional: She appears well-developed and well-nourished  No distress  HENT:   Head: Normocephalic and atraumatic  Right Ear: Tympanic membrane, external ear and ear canal normal    Left Ear: Tympanic membrane, external ear and ear canal normal    Nose: Mucosal edema (bilateral moderate turbinate swelling without erythema) present  Right sinus exhibits no maxillary sinus tenderness and no frontal sinus tenderness  Left sinus exhibits no maxillary sinus tenderness and no frontal sinus tenderness  Mouth/Throat: Oropharynx is clear and moist  No oropharyngeal exudate, posterior oropharyngeal edema or posterior oropharyngeal erythema  Neck: Neck supple  No thyromegaly present  Cardiovascular: Normal rate, regular rhythm, normal heart sounds and intact distal pulses  No murmur heard  Pulmonary/Chest: Effort normal and breath sounds normal  She has no wheezes  She has no rales  Musculoskeletal: She exhibits no edema  Lymphadenopathy:     She has cervical adenopathy (bilateral anterior)  Skin: Skin is warm and dry  Psychiatric: She has a normal mood and affect  Her behavior is normal    Vitals reviewed            ALLERGIES:  No Known Allergies    Current Medications     Current Outpatient Prescriptions   Medication Sig Dispense Refill    ferrous sulfate 324 (65 Fe) mg Take 1 tablet (324 mg total) by mouth 2 (two) times a day before meals 60 tablet 5    ibuprofen (MOTRIN) 600 mg tablet Take 1 tablet (600 mg total) by mouth every 6 (six) hours as needed (pain) 30 tablet 0    norethindrone (JENCYCLA) 0 35 MG tablet Take 1 tablet by mouth daily      albuterol (VENTOLIN HFA) 90 mcg/act inhaler Inhale 2 puffs every 4 (four) hours as needed for wheezing (or chest tightness) 18 g 0    benzonatate (TESSALON PERLES) 100 mg capsule Take 1-2 capsules (100-200 mg total) by mouth 3 (three) times a day as needed for cough 30 capsule 0    fluticasone (FLONASE) 50 mcg/act nasal spray 2 sprays into each nostril daily 16 g 0     No current facility-administered medications for this visit            Anupama Flores PA-C  10/18/2018 10:03 AM  Norman [facility]ARRAGAN [facility]

## 2018-10-18 NOTE — PATIENT INSTRUCTIONS
I reviewed with the patient that she appears to have a viral upper respiratory infection  She was encouraged to increase fluids and rest   She was given benzonatate uses needed for cough suppression  She was also given an albuterol inhaler to use as needed for any chest tightness or wheezing  She was encouraged to start using 2 sprays of Flonase per nostril once daily to help with her nasal congestion  She was encouraged to call for symptoms are worsening or failing to improve over the next few days  We reviewed that viral upper respiratory infections usually takes 7-10 days to run their course; however, if she is worsening or not improving over that time frame, we should consider an antibiotic  She was given a note to excuse her from work tomorrow if she is not feeling up to going  She should call with any problems or concerns

## 2018-10-22 ENCOUNTER — APPOINTMENT (OUTPATIENT)
Dept: URGENT CARE | Age: 53
End: 2018-10-22
Payer: OTHER MISCELLANEOUS

## 2018-10-22 PROCEDURE — 99214 OFFICE O/P EST MOD 30 MIN: CPT | Performed by: PREVENTIVE MEDICINE

## 2018-10-23 ENCOUNTER — TELEPHONE (OUTPATIENT)
Dept: FAMILY MEDICINE CLINIC | Facility: CLINIC | Age: 53
End: 2018-10-23

## 2018-10-23 DIAGNOSIS — J06.9 UPPER RESPIRATORY TRACT INFECTION, UNSPECIFIED TYPE: Primary | ICD-10-CM

## 2018-10-23 RX ORDER — AZITHROMYCIN 250 MG/1
TABLET, FILM COATED ORAL
Qty: 6 TABLET | Refills: 0 | Status: SHIPPED | OUTPATIENT
Start: 2018-10-23 | End: 2018-10-27

## 2018-10-23 NOTE — TELEPHONE ENCOUNTER
Script for Nia was sent to her pharmacy  If she would like to try the albuterol inhaler, she should be able to get a small 8 g inhaler of Ventolin using [facility] for $25 62 at [facility]  If she uses [facility], it would cost her $26 63 at [facility]   With [facility], Flonase would cost her $13 28 at [facility] or [facility] would cost $22 14 using [facility] for Flonase

## 2018-10-23 NOTE — TELEPHONE ENCOUNTER
Pt called today she is not feeling better, c/o chest congestion, wheezing, running nose  Pt taking Tessalon perles is helping a little during daytime  but not at bedtime  Pt couldn't  Get  Her albuterol and Flonase since it was really expensive, pt is requesting antibiotic

## 2018-10-26 ENCOUNTER — TELEPHONE (OUTPATIENT)
Dept: GASTROENTEROLOGY | Facility: CLINIC | Age: 53
End: 2018-10-26

## 2018-10-26 NOTE — PROGRESS NOTES
Called pharmacy and asked them to discontinue prescription, but told there wasn't an active prescription for this medication

## 2018-10-26 NOTE — TELEPHONE ENCOUNTER
Patient cancelled colonoscopy due to not having insurance, she will call me directly when she can reschedule

## 2018-10-29 ENCOUNTER — TELEPHONE (OUTPATIENT)
Dept: FAMILY MEDICINE CLINIC | Facility: CLINIC | Age: 53
End: 2018-10-29

## 2018-10-29 NOTE — TELEPHONE ENCOUNTER
Melani Manzano,   Patient called the office for an update on how she is doing with her viral upper resp tract infection  She has completed her dose of Abx, still having cough, no fever, no pressure chest   I had asked patient if she also used ventolin and flonase, she stated that she did not pick this up due to cost issuesW  [employer] doesn't cover her ventolin cost $55)    Patient is asking should she need more Abx or you would like to see her - asking what is the next step for her  Patient does states she is feeling better but still cough phlegm       Also patient was asking for Ibuprofen 600 mg I informed her that Ibuprofen was not Rx by us  (she was asking a refill on Ibuprofen for frequent headaches at bedtime)  She will then call the provider that ordered Ibuprofen          Please advise

## 2018-10-29 NOTE — TELEPHONE ENCOUNTER
I would recommend that she try Mucinex (guaifenesin) 600 mg twice daily to help her clear the remaining phlegm  Continue good fluid intake and rest  Follow-up visit needed if she has has worsening symptoms or not continuing to improve over the next few days

## 2018-11-13 ENCOUNTER — TELEPHONE (OUTPATIENT)
Dept: HEMATOLOGY ONCOLOGY | Facility: CLINIC | Age: 53
End: 2018-11-13

## 2018-11-13 NOTE — TELEPHONE ENCOUNTER
I called the patient to remind that she should get her blood work done prior her appointment  She said she will do it tomorrow 11/14/18

## 2018-11-14 ENCOUNTER — APPOINTMENT (OUTPATIENT)
Dept: LAB | Facility: CLINIC | Age: 53
End: 2018-11-14
Payer: COMMERCIAL

## 2018-11-14 DIAGNOSIS — D50.0 IRON DEFICIENCY ANEMIA DUE TO CHRONIC BLOOD LOSS: ICD-10-CM

## 2018-11-14 LAB
BASOPHILS # BLD AUTO: 0.1 THOUSANDS/ΜL (ref 0–0.1)
BASOPHILS NFR BLD AUTO: 1 % (ref 0–1)
EOSINOPHIL # BLD AUTO: 0.22 THOUSAND/ΜL (ref 0–0.61)
EOSINOPHIL NFR BLD AUTO: 2 % (ref 0–6)
ERYTHROCYTE [DISTWIDTH] IN BLOOD BY AUTOMATED COUNT: 13.2 % (ref 11.6–15.1)
FERRITIN SERPL-MCNC: 11 NG/ML (ref 8–388)
HCT VFR BLD AUTO: 41.6 % (ref 34.8–46.1)
HGB BLD-MCNC: 12.9 G/DL (ref 11.5–15.4)
IMM GRANULOCYTES # BLD AUTO: 0.03 THOUSAND/UL (ref 0–0.2)
IMM GRANULOCYTES NFR BLD AUTO: 0 % (ref 0–2)
IRON SATN MFR SERPL: 14 %
IRON SERPL-MCNC: 50 UG/DL (ref 50–170)
LYMPHOCYTES # BLD AUTO: 3.09 THOUSANDS/ΜL (ref 0.6–4.47)
LYMPHOCYTES NFR BLD AUTO: 29 % (ref 14–44)
MCH RBC QN AUTO: 28.4 PG (ref 26.8–34.3)
MCHC RBC AUTO-ENTMCNC: 31 G/DL (ref 31.4–37.4)
MCV RBC AUTO: 91 FL (ref 82–98)
MONOCYTES # BLD AUTO: 0.61 THOUSAND/ΜL (ref 0.17–1.22)
MONOCYTES NFR BLD AUTO: 6 % (ref 4–12)
NEUTROPHILS # BLD AUTO: 6.5 THOUSANDS/ΜL (ref 1.85–7.62)
NEUTS SEG NFR BLD AUTO: 62 % (ref 43–75)
NRBC BLD AUTO-RTO: 0 /100 WBCS
PLATELET # BLD AUTO: 317 THOUSANDS/UL (ref 149–390)
PMV BLD AUTO: 11.2 FL (ref 8.9–12.7)
RBC # BLD AUTO: 4.55 MILLION/UL (ref 3.81–5.12)
TIBC SERPL-MCNC: 348 UG/DL (ref 250–450)
WBC # BLD AUTO: 10.55 THOUSAND/UL (ref 4.31–10.16)

## 2018-11-14 PROCEDURE — 82728 ASSAY OF FERRITIN: CPT

## 2018-11-14 PROCEDURE — 36415 COLL VENOUS BLD VENIPUNCTURE: CPT

## 2018-11-14 PROCEDURE — 85025 COMPLETE CBC W/AUTO DIFF WBC: CPT

## 2018-11-14 PROCEDURE — 83550 IRON BINDING TEST: CPT

## 2018-11-14 PROCEDURE — 83540 ASSAY OF IRON: CPT

## 2018-11-15 ENCOUNTER — OFFICE VISIT (OUTPATIENT)
Dept: HEMATOLOGY ONCOLOGY | Facility: CLINIC | Age: 53
End: 2018-11-15
Payer: COMMERCIAL

## 2018-11-15 VITALS
BODY MASS INDEX: 27.19 KG/M2 | TEMPERATURE: 97.9 F | HEIGHT: 61 IN | OXYGEN SATURATION: 97 % | HEART RATE: 73 BPM | DIASTOLIC BLOOD PRESSURE: 70 MMHG | SYSTOLIC BLOOD PRESSURE: 110 MMHG | WEIGHT: 144 LBS | RESPIRATION RATE: 16 BRPM

## 2018-11-15 DIAGNOSIS — D50.0 IRON DEFICIENCY ANEMIA DUE TO CHRONIC BLOOD LOSS: Primary | ICD-10-CM

## 2018-11-15 PROCEDURE — 99214 OFFICE O/P EST MOD 30 MIN: CPT | Performed by: PHYSICIAN ASSISTANT

## 2018-11-15 NOTE — LETTER
November 15, 2018     Sal Pulido, [address] [facility]  [address] [facility]    Patient: Yasmine Chirinos   YOB: 1965   Date of Visit: 11/15/2018       Dear Dr Malik Munoz: Thank you for referring Harshil Sanderson to me for evaluation  Below are my notes for this consultation  If you have questions, please do not hesitate to call me  I look forward to following your patient along with you  Sincerely,        Clarisa Singh PA-C        CC: No Recipients  Clarisa Singh PA-C  11/15/2018  1:18 PM  Sign at close encounter  [place]  [address] [place] [address]  [phone]  Hematology Ambulatory Follow-Up  Harshil Sanderson, 1965, [id number]  11/15/2018    Assessment/Plan:    1  Iron deficiency anemia secondary to menorrhagia  Patient previously completed IV iron in May 2018  Patient was instructed to continue with oral iron regiment twice a day  However follow-up blood work at the 3 month interval demonstrated significant decrease in iron saturation and ferritin  At this time I have advised the patient to undergo repeat replacement with IV iron  Patient did not have any complications with IV Feraheme and therefore this has been recommended again  Regimen:  Feraheme 510 mg IV weekly x2 doses  Patient will likely undergo uterine ablation in December 2018, to control bleeding  Patient was instructed to follow up in 3 months with blood work prior  Patient understands call the office with any questions or concerns  - CBC and differential; Future  - Iron Panel; Future      2  Leukocytosis  Differential remains within normal limits  No additional recommendations for evaluation at this time  CBC will be monitored at the 3 month interval as stated above      Labs and imaging for follow up:  Orders Placed This Encounter   Procedures    CBC and differential     This is a patient instruction: This test is non-fasting  Please drink two glasses of water morning of bloodwork  Standing Status:   Future     Standing Expiration Date:   11/15/2019       Patient knows to call the Hematology/Oncology office with any questions and concerns regarding the above  Carefully review your medication list in verify the list is accurate and up-to-date  Please call the hematologic/oncology office if there medications missing from the less, medications on the list that your not currently taking or if there is a dosage or instruction that is different from higher taking medication  I have spent 25 minutes with {Patient /Family:[id number]} today in which greater than 50% of this time was spent in counseling/coordination of care regarding Diagnostic results, Prognosis and Impressions  Barrier(s) to care: None  The patient is able to self care     -------------------------------------------------------------------------------------------------------    Chief complaint:   Chief Complaint   Patient presents with    Follow-up     3 months follow up with labs       History of present illness: This is a 47year old female with long standing iron deficiency who has been taking oral iron supplements for several months/years prescribed by gynecologist secondary to heavy menstruation who presents to the Hematology office for evaluation of microcytic anemia      Patient notes that earlier this year she underwent an endometrial biopsy-2/22/18-negative for malignancy   Patient notes that she has been on a progesterone only pill, and has achieved later periods without clots  [address] evaluation was completed in May which demonstrated a hemoglobin decreased compared to that in February however RDW and MCV showed progressive signs of iron deficiency   Patient was referred to Hematology in May 2018 to discuss IV iron supplementation      Patient underwent IV Feraheme infusion x2 doses in May 2018    Patient tolerated doses without significant side effect  Patient has appreciated a significant response with iron saturation improved to 19% and ferritin at 89 ng/dL  Patient also followed up with gynecology who was able to start the patient on progesterone only supplements  Patient has noted a significant decrease in the amount of menstrual blood and perimenstural/ menstrual symptoms  Interval history:  Patient notes that over the past several months since her last evaluation in August she continues to have vaginal bleeding  Patient discussed with her physician about additional uterine of lesions  Patient notes she continues to take oral iron  Iron studies demonstrate a decreased from previous baseline  Review of Systems   Constitutional: Positive for fatigue  Negative for appetite change, fever and unexpected weight change  HENT: Negative for nosebleeds  Respiratory: Negative for cough, choking and shortness of breath  Negative hemoptysis  Cardiovascular: Negative for chest pain, palpitations and leg swelling  Gastrointestinal: Negative  Negative for abdominal distention, abdominal pain, anal bleeding, blood in stool, constipation, diarrhea, nausea and vomiting  Endocrine: Negative  Negative for cold intolerance  Genitourinary: Positive for vaginal bleeding  Negative for hematuria, menstrual problem, vaginal discharge and vaginal pain  Musculoskeletal: Negative  Negative for arthralgias, myalgias, neck pain and neck stiffness  Skin: Negative  Negative for color change, pallor and rash  Allergic/Immunologic: Negative  Negative for immunocompromised state  Neurological: Negative  Negative for weakness and headaches  Hematological: Negative for adenopathy  Does not bruise/bleed easily  All other systems reviewed and are negative      Patient Active Problem List   Diagnosis    Iron deficiency anemia due to chronic blood loss    Menorrhagia with regular cycle    Uterine fibroid    History of positive PPD    RBC microcytosis    Encounter for screening colonoscopy    Abnormal uterine bleeding    Anemia    Blurry vision    Intraductal hyperplasia without atypia of right breast    Thyroid enlargement     Past Medical History:   Diagnosis Date    Anemia     Disease of thyroid gland     enlarged thyroid    Intraductal hyperplasia without atypia of right breast 02/2016    c florid and sclerosing adenosis, background dense stromal fibrosis    Iron deficiency anemia due to chronic blood loss     transfused 2015 9/17: ferritin 2   H/H 8 8/31 7%    Menorrhagia with regular cycle     Varicella     without complication     Past Surgical History:   Procedure Laterality Date    BREAST BIOPSY Right 02/2016    intraductal hyperplasia without atypia,fibroid and sclerosing adenosis, and columnar cell change in a bsckround of dense stromal fibrosis no malignancy identified    TUBAL LIGATION      US GUIDED BREAST BIOPSY RIGHT COMPLETE Right 2/19/2016     Family History   Problem Relation Age of Onset   [place] Breast cancer Mother         52's passed 78 yo c mets    Anemia Mother     Thyroid disease Mother         total thyroidectomy uncertain reason    Hyperlipidemia Father     Deep vein thrombosis Neg Hx     Mental illness Neg Hx     Hypertension Neg Hx     Coronary artery disease Neg Hx     Diabetes type II Neg Hx      Social History     Social History    Marital status: /Civil Union     Spouse name: N/A    Number of children: N/A    Years of education: N/A     Occupational History    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      Social History Main Topics    Smoking status: Never Smoker    Smokeless tobacco: Never Used    Alcohol use No      Comment: rare    Drug use: No    Sexual activity: Not Asked     Other Topics Concern    None     Social History Narrative    ** Merged History Encounter **    Inadequate exercise    [employer]                Current Outpatient Prescriptions:     albuterol (VENTOLIN HFA) 90 mcg/act inhaler, Inhale 2 puffs every 4 (four) hours as needed for wheezing (or chest tightness), Disp: 18 g, Rfl: 0    benzonatate (TESSALON PERLES) 100 mg capsule, Take 1-2 capsules (100-200 mg total) by mouth 3 (three) times a day as needed for cough, Disp: 30 capsule, Rfl: 0    ferrous sulfate 324 (65 Fe) mg, Take 1 tablet (324 mg total) by mouth 2 (two) times a day before meals, Disp: 60 tablet, Rfl: 5    fluticasone (FLONASE) 50 mcg/act nasal spray, 2 sprays into each nostril daily, Disp: 16 g, Rfl: 0    ibuprofen (MOTRIN) 600 mg tablet, Take 1 tablet (600 mg total) by mouth every 6 (six) hours as needed (pain), Disp: 30 tablet, Rfl: 0    norethindrone (JENCYCLA) 0 35 MG tablet, Take 1 tablet by mouth daily, Disp: , Rfl:     No Known Allergies    Objective:  /70 (BP Location: Left arm, Cuff Size: Standard)   Pulse 73   Temp 97 9 °F (36 6 °C) (Tympanic)   Resp 16   Ht 5' 0 6" (1 539 m)   Wt 65 3 kg (144 lb)   SpO2 97%   BMI 27 57 kg/m²     Physical Exam   Constitutional: She is oriented to person, place, and time  She appears well-developed and well-nourished  No distress  HENT:   Head: Normocephalic and atraumatic  Eyes: Pupils are equal, round, and reactive to light  No scleral icterus  Cardiovascular: Normal rate and regular rhythm  No murmur heard  Pulmonary/Chest: Effort normal  No respiratory distress  Musculoskeletal: She exhibits no edema  Neurological: She is alert and oriented to person, place, and time  Skin: Skin is warm  No rash noted  No pallor  Psychiatric: She has a normal mood and affect   Thought content normal        Result Review  Labs:  Appointment on 11/14/2018   Component Date Value Ref Range Status    WBC 11/14/2018 10 55* 4 31 - 10 16 Thousand/uL Final    RBC 11/14/2018 4 55  3 81 - 5 12 Million/uL Final    Hemoglobin 11/14/2018 12 9  11 5 - 15 4 g/dL Final    Hematocrit 11/14/2018 41 6  34 8 - 46 1 % Final    MCV 11/14/2018 91 82 - 98 fL Final    MCH 11/14/2018 28 4  26 8 - 34 3 pg Final    MCHC 11/14/2018 31 0* 31 4 - 37 4 g/dL Final    RDW 11/14/2018 13 2  11 6 - 15 1 % Final    MPV 11/14/2018 11 2  8 9 - 12 7 fL Final    Platelets 31/06/3604 317  149 - 390 Thousands/uL Final    nRBC 11/14/2018 0  /100 WBCs Final    Neutrophils Relative 11/14/2018 62  43 - 75 % Final    Immat GRANS % 11/14/2018 0  0 - 2 % Final    Lymphocytes Relative 11/14/2018 29  14 - 44 % Final    Monocytes Relative 11/14/2018 6  4 - 12 % Final    Eosinophils Relative 11/14/2018 2  0 - 6 % Final    Basophils Relative 11/14/2018 1  0 - 1 % Final    Neutrophils Absolute 11/14/2018 6 50  1 85 - 7 62 Thousands/µL Final    Immature Grans Absolute 11/14/2018 0 03  0 00 - 0 20 Thousand/uL Final    Lymphocytes Absolute 11/14/2018 3 09  0 60 - 4 47 Thousands/µL Final    Monocytes Absolute 11/14/2018 0 61  0 17 - 1 22 Thousand/µL Final    Eosinophils Absolute 11/14/2018 0 22  0 00 - 0 61 Thousand/µL Final    Basophils Absolute 11/14/2018 0 10  0 00 - 0 10 Thousands/µL Final    Iron Saturation 11/14/2018 14  % Final    TIBC 11/14/2018 348  250 - 450 ug/dL Final    Iron 11/14/2018 50  50 - 170 ug/dL Final      Patients treated with metal-binding drugs (ie  Deferoxamine) may have depressed iron values      Ferritin 11/14/2018 11  8 - 388 ng/mL Final   Admission on 09/15/2018, Discharged on 09/15/2018   Component Date Value Ref Range Status    EXT PREG TEST UR (Ref: Negative) 09/15/2018 negative   Final    Color, UA 09/15/2018 Straw  Straw, Yellow, Pale Yellow Final    Clarity, UA 09/15/2018 Clear  Clear, Other Final    Specific Gravity, UA 09/15/2018 1 010  1 003 - 1 040 Final    pH, UA 09/15/2018 6 0  4 5 - 8 0 Final    Leukocytes, UA 09/15/2018 Negative  Negative Final    Nitrite, UA 09/15/2018 Negative  Negative Final    Protein, UA 09/15/2018 Negative  Negative mg/dl Final    Glucose, UA 09/15/2018 Negative  Negative mg/dl Final    Ketones, UA 09/15/2018 Negative  Negative mg/dl Final    Bilirubin, UA 09/15/2018 Negative  Negative Final    Blood, UA 09/15/2018 250 0* Negative Final    UROBILINOGEN UA 09/15/2018 Negative  1 0, Negative mg/dL Final    WBC 09/15/2018 12 60* 4 50 - 11 00 Thousand/uL Final    RBC 09/15/2018 4 74  4 00 - 5 20 Million/uL Final    Hemoglobin 09/15/2018 14 1  12 0 - 16 0 g/dL Final    Hematocrit 09/15/2018 42 5  36 0 - 46 0 % Final    MCV 09/15/2018 90  80 - 100 fL Final    MCH 09/15/2018 29 7  26 0 - 34 0 pg Final    MCHC 09/15/2018 33 1  31 0 - 36 0 g/dL Final    RDW 09/15/2018 13 8  <15 3 % Final    MPV 09/15/2018 8 9  8 9 - 12 7 fL Final    Platelets 36/11/5661 367  150 - 450 Thousands/uL Final    Neutrophils Relative 09/15/2018 63  45 - 65 % Final    Lymphocytes Relative 09/15/2018 28  20 - 50 % Final    Monocytes Relative 09/15/2018 6  1 - 10 % Final    Eosinophils Relative 09/15/2018 3  0 - 6 % Final    Basophils Relative 09/15/2018 1  0 - 1 % Final    Neutrophils Absolute 09/15/2018 7 90* 1 80 - 7 80 Thousands/µL Final    Lymphocytes Absolute 09/15/2018 3 50  0 50 - 4 00 Thousands/µL Final    Monocytes Absolute 09/15/2018 0 80  0 20 - 0 90 Thousand/µL Final    Eosinophils Absolute 09/15/2018 0 40  0 00 - 0 40 Thousand/µL Final    Basophils Absolute 09/15/2018 0 10  0 00 - 0 10 Thousands/µL Final    Sodium 09/15/2018 142  137 - 147 mmol/L Final    Potassium 09/15/2018 3 8  3 6 - 5 0 mmol/L Final    Chloride 09/15/2018 105  97 - 108 mmol/L Final    CO2 09/15/2018 27  22 - 30 mmol/L Final    ANION GAP 09/15/2018 10  5 - 14 mmol/L Final    BUN 09/15/2018 9  5 - 25 mg/dL Final    Creatinine 09/15/2018 0 81  0 60 - 1 20 mg/dL Final    Standardized to IDMS reference method    Glucose 09/15/2018 103* 70 - 99 mg/dL Final      If the patient is fasting, the ADA then defines impaired fasting glucose as > 100 mg/dL and diabetes as > or equal to 123 mg/dL    Specimen collection should occur prior to Sulfasalazine administration due to the potential for falsely depressed results  Specimen collection should occur prior to Sulfapyridine administration due to the potential for falsely elevated results   Calcium 09/15/2018 10 4* 8 4 - 10 2 mg/dL Final    AST 09/15/2018 20  14 - 36 U/L Final      Specimen collection should occur prior to Sulfasalazine administration due to the potential for falsely depressed results   ALT 09/15/2018 22  9 - 52 U/L Final      Specimen collection should occur prior to Sulfasalazine administration due to the potential for falsely depressed results       Alkaline Phosphatase 09/15/2018 112  43 - 122 U/L Final    Total Protein 09/15/2018 8 7* 5 9 - 8 4 g/dL Final    Albumin 09/15/2018 4 4  3 0 - 5 2 g/dL Final    Total Bilirubin 09/15/2018 0 20  <1 30 mg/dL Final    eGFR 09/15/2018 84  >60 ml/min/1 73sq m Final    Lipase 09/15/2018 113  23 - 300 u/L Final    HCG, Quant 09/15/2018 <3  <=5 mIU/mL Final    N gonorrhoeae, DNA Probe 09/15/2018 N  gonorrhoeae Amplified DNA Negative  N  gonorrhoeae Amplified DNA Negative Final    Chlamydia, DNA Probe 09/15/2018 C  trachomatis Amplified DNA Negative  C  trachomatis Amplified DNA Negative Final    Candida Species 09/15/2018 Negative  Negative Final    Gardnerella vaginalis 09/15/2018 Negative  Negative Final    Trichomonas vaginalis 09/15/2018 Negative  Negative Final    RBC, UA 09/15/2018 Innumerable* None Seen, 0-5 /hpf Final    WBC, UA 09/15/2018 0-1* None Seen, 0-5, 5-55, 5-65 /hpf Final    Epithelial Cells 09/15/2018 Occasional  None Seen, Occasional /hpf Final    Bacteria, UA 09/15/2018 None Seen  None Seen, Occasional /hpf Final   Admission on 09/01/2018, Discharged on 09/01/2018   Component Date Value Ref Range Status    EXT PREG TEST UR (Ref: Negative) 09/01/2018 negative   Final    Color, UA 09/01/2018 Red* Straw, Yellow, Pale Yellow Final    Clarity, UA 09/01/2018 Cloudy* Clear, Other Final    Specific [place], UA 09/01/2018 1 010  1 003 - 1 040 Final    pH, UA 09/01/2018 8 0  4 5 - 8 0 Final    Leukocytes, UA 09/01/2018 Negative  Negative Final    Nitrite, UA 09/01/2018 Negative  Negative Final    Protein, UA 09/01/2018 >=500* Negative mg/dl Final    Glucose, UA 09/01/2018 Negative  Negative mg/dl Final    Ketones, UA 09/01/2018 Negative  Negative mg/dl Final    Bilirubin, UA 09/01/2018 Negative  Negative Final    Blood, UA 09/01/2018 250 0* Negative Final    UROBILINOGEN UA 09/01/2018 Negative  1 0, Negative mg/dL Final    WBC 09/01/2018 12 50* 4 50 - 11 00 Thousand/uL Final    RBC 09/01/2018 4 43  4 00 - 5 20 Million/uL Final    Hemoglobin 09/01/2018 13 0  12 0 - 16 0 g/dL Final    Hematocrit 09/01/2018 39 1  36 0 - 46 0 % Final    MCV 09/01/2018 88  80 - 100 fL Final    MCH 09/01/2018 29 3  26 0 - 34 0 pg Final    MCHC 09/01/2018 33 2  31 0 - 36 0 g/dL Final    RDW 09/01/2018 14 0  <15 3 % Final    MPV 09/01/2018 9 1  8 9 - 12 7 fL Final    Platelets 33/08/2390 277  150 - 450 Thousands/uL Final    Neutrophils Relative 09/01/2018 72* 45 - 65 % Final    Lymphocytes Relative 09/01/2018 21  20 - 50 % Final    Monocytes Relative 09/01/2018 6  1 - 10 % Final    Eosinophils Relative 09/01/2018 1  0 - 6 % Final    Basophils Relative 09/01/2018 1  0 - 1 % Final    Neutrophils Absolute 09/01/2018 8 90* 1 80 - 7 80 Thousands/µL Final    Lymphocytes Absolute 09/01/2018 2 60  0 50 - 4 00 Thousands/µL Final    Monocytes Absolute 09/01/2018 0 70  0 20 - 0 90 Thousand/µL Final    Eosinophils Absolute 09/01/2018 0 10  0 00 - 0 40 Thousand/µL Final    Basophils Absolute 09/01/2018 0 10  0 00 - 0 10 Thousands/µL Final    Sodium 09/01/2018 138  137 - 147 mmol/L Final    Potassium 09/01/2018 3 9  3 6 - 5 0 mmol/L Final    Chloride 09/01/2018 108  97 - 108 mmol/L Final    CO2 09/01/2018 24  22 - 30 mmol/L Final    ANION GAP 09/01/2018 6  5 - 14 mmol/L Final    BUN 09/01/2018 9  5 - 25 mg/dL Final  Creatinine 09/01/2018 0 58* 0 60 - 1 20 mg/dL Final    Standardized to IDMS reference method    Glucose 09/01/2018 98  70 - 99 mg/dL Final      If the patient is fasting, the ADA then defines impaired fasting glucose as > 100 mg/dL and diabetes as > or equal to 123 mg/dL  Specimen collection should occur prior to Sulfasalazine administration due to the potential for falsely depressed results  Specimen collection should occur prior to Sulfapyridine administration due to the potential for falsely elevated results   Calcium 09/01/2018 9 5  8 4 - 10 2 mg/dL Final    AST 09/01/2018 15  14 - 36 U/L Final      Specimen collection should occur prior to Sulfasalazine administration due to the potential for falsely depressed results   ALT 09/01/2018 25  9 - 52 U/L Final      Specimen collection should occur prior to Sulfasalazine administration due to the potential for falsely depressed results       Alkaline Phosphatase 09/01/2018 103  43 - 122 U/L Final    Total Protein 09/01/2018 7 4  5 9 - 8 4 g/dL Final    Albumin 09/01/2018 3 6  3 0 - 5 2 g/dL Final    Total Bilirubin 09/01/2018 0 20  <1 30 mg/dL Final    eGFR 09/01/2018 106  >60 ml/min/1 73sq m Final    Lipase 09/01/2018 69  23 - 300 u/L Final    N gonorrhoeae, DNA Probe 09/01/2018 N  gonorrhoeae Amplified DNA Negative  N  gonorrhoeae Amplified DNA Negative Final    Chlamydia, DNA Probe 09/01/2018 C  trachomatis Amplified DNA Negative  C  trachomatis Amplified DNA Negative Final    Candida Species 09/01/2018 Negative  Negative Final    Gardnerella vaginalis 09/01/2018 Negative  Negative Final    Trichomonas vaginalis 09/01/2018 Negative  Negative Final    RBC, UA 09/01/2018 Innumerable* None Seen, 0-5 /hpf Final    WBC, UA 09/01/2018 0-1* None Seen, 0-5, 5-55, 5-65 /hpf Final    Epithelial Cells 09/01/2018 Occasional  None Seen, Occasional /hpf Final    Bacteria, UA 09/01/2018 Occasional  None Seen, Occasional /hpf Final    RBC Morphology 09/01/2018 Normal   Final    Platelet Estimate 86/06/0104 Adequate  Adequate Final     Appointment on 11/14/2018   Component Date Value Ref Range Status    WBC 11/14/2018 10 55* 4 31 - 10 16 Thousand/uL Final    RBC 11/14/2018 4 55  3 81 - 5 12 Million/uL Final    Hemoglobin 11/14/2018 12 9  11 5 - 15 4 g/dL Final    Hematocrit 11/14/2018 41 6  34 8 - 46 1 % Final    MCV 11/14/2018 91  82 - 98 fL Final    MCH 11/14/2018 28 4  26 8 - 34 3 pg Final    MCHC 11/14/2018 31 0* 31 4 - 37 4 g/dL Final    RDW 11/14/2018 13 2  11 6 - 15 1 % Final    MPV 11/14/2018 11 2  8 9 - 12 7 fL Final    Platelets 07/38/6832 317  149 - 390 Thousands/uL Final    nRBC 11/14/2018 0  /100 WBCs Final    Neutrophils Relative 11/14/2018 62  43 - 75 % Final    Immat GRANS % 11/14/2018 0  0 - 2 % Final    Lymphocytes Relative 11/14/2018 29  14 - 44 % Final    Monocytes Relative 11/14/2018 6  4 - 12 % Final    Eosinophils Relative 11/14/2018 2  0 - 6 % Final    Basophils Relative 11/14/2018 1  0 - 1 % Final    Neutrophils Absolute 11/14/2018 6 50  1 85 - 7 62 Thousands/µL Final    Immature Grans Absolute 11/14/2018 0 03  0 00 - 0 20 Thousand/uL Final    Lymphocytes Absolute 11/14/2018 3 09  0 60 - 4 47 Thousands/µL Final    Monocytes Absolute 11/14/2018 0 61  0 17 - 1 22 Thousand/µL Final    Eosinophils Absolute 11/14/2018 0 22  0 00 - 0 61 Thousand/µL Final    Basophils Absolute 11/14/2018 0 10  0 00 - 0 10 Thousands/µL Final    Iron Saturation 11/14/2018 14  % Final    TIBC 11/14/2018 348  250 - 450 ug/dL Final    Iron 11/14/2018 50  50 - 170 ug/dL Final      Patients treated with metal-binding drugs (ie  Deferoxamine) may have depressed iron values   Ferritin 11/14/2018 11  8 - 388 ng/mL Final       Imaging:     Please note: This report has been generated by a voice recognition software system  Therefore there may be syntax, spelling, and/or grammatical errors  Please call if you have any questions

## 2018-11-15 NOTE — LETTER
November 15, 2018     Xavi Morris, [address] [facility]  [address] [facility]    Patient: Leila Irby   YOB: 1965   Date of Visit: 11/15/2018       Dear Dr Pamela Medel: Thank you for referring Harshil Sanderson to me for evaluation  Below are my notes for this consultation  If you have questions, please do not hesitate to call me  I look forward to following your patient along with you  Sincerely,        Afua Pop PA-C        CC: No Recipients  Afua Pop PA-C  11/15/2018  1:18 PM  Sign at close encounter  [place]  [address] [facility] [address] [facility] 12[address] [facility]  [phone]  Hematology Ambulatory Follow-Up  Harshil Sanderson, 1965, [id number]  11/15/2018    Assessment/Plan:    1  Iron deficiency anemia secondary to menorrhagia  Patient previously completed IV iron in May 2018  Patient was instructed to continue with oral iron regiment twice a day  However follow-up blood work at the 3 month interval demonstrated significant decrease in iron saturation and ferritin  At this time I have advised the patient to undergo repeat replacement with IV iron  Patient did not have any complications with IV Feraheme and therefore this has been recommended again  Regimen:  Feraheme 510 mg IV weekly x2 doses  Patient will likely undergo uterine ablation in December 2018, to control bleeding  Patient was instructed to follow up in 3 months with blood work prior  Patient understands call the office with any questions or concerns  - CBC and differential; Future  - Iron Panel; Future      2  Leukocytosis  Differential remains within normal limits  No additional recommendations for evaluation at this time  CBC will be monitored at the 3 month interval as stated above      Labs and imaging for follow up:  Orders Placed This Encounter   Procedures    CBC and differential     This is a patient instruction: This test is non-fasting  Please drink two glasses of water morning of bloodwork  Standing Status:   Future     Standing Expiration Date:   11/15/2019       Patient knows to call the Hematology/Oncology office with any questions and concerns regarding the above  Carefully review your medication list in verify the list is accurate and up-to-date  Please call the hematologic/oncology office if there medications missing from the less, medications on the list that your not currently taking or if there is a dosage or instruction that is different from higher taking medication  I have spent 25 minutes with Patient  today in which greater than 50% of this time was spent in counseling/coordination of care regarding Diagnostic results, Prognosis and Impressions  Barrier(s) to care: None  The patient is able to self care     -------------------------------------------------------------------------------------------------------    Chief complaint:   Chief Complaint   Patient presents with    Follow-up     3 months follow up with labs       History of present illness: This is a 47year old female with long standing iron deficiency who has been taking oral iron supplements for several months/years prescribed by gynecologist secondary to heavy menstruation who presents to the Hematology office for evaluation of microcytic anemia      Patient notes that earlier this year she underwent an endometrial biopsy-2/22/18-negative for malignancy   Patient notes that she has been on a progesterone only pill, and has achieved later periods without clots  [address] evaluation was completed in May which demonstrated a hemoglobin decreased compared to that in February however RDW and MCV showed progressive signs of iron deficiency   Patient was referred to Hematology in May 2018 to discuss IV iron supplementation      Patient underwent IV Feraheme infusion x2 doses in May 2018    Patient tolerated doses without significant side effect  Patient has appreciated a significant response with iron saturation improved to 19% and ferritin at 89 ng/dL  Patient also followed up with gynecology who was able to start the patient on progesterone only supplements  Patient has noted a significant decrease in the amount of menstrual blood and perimenstural/ menstrual symptoms  Interval history:  Patient notes that over the past several months since her last evaluation in August she continues to have vaginal bleeding  Patient discussed with her physician about additional uterine of lesions  Patient notes she continues to take oral iron  Iron studies demonstrate a decreased from previous baseline  Review of Systems   Constitutional: Positive for fatigue  Negative for appetite change, fever and unexpected weight change  HENT: Negative for nosebleeds  Respiratory: Negative for cough, choking and shortness of breath  Negative hemoptysis  Cardiovascular: Negative for chest pain, palpitations and leg swelling  Gastrointestinal: Negative  Negative for abdominal distention, abdominal pain, anal bleeding, blood in stool, constipation, diarrhea, nausea and vomiting  Endocrine: Negative  Negative for cold intolerance  Genitourinary: Positive for vaginal bleeding  Negative for hematuria, menstrual problem, vaginal discharge and vaginal pain  Musculoskeletal: Negative  Negative for arthralgias, myalgias, neck pain and neck stiffness  Skin: Negative  Negative for color change, pallor and rash  Allergic/Immunologic: Negative  Negative for immunocompromised state  Neurological: Negative  Negative for weakness and headaches  Hematological: Negative for adenopathy  Does not bruise/bleed easily  All other systems reviewed and are negative      Patient Active Problem List   Diagnosis    Iron deficiency anemia due to chronic blood loss    Menorrhagia with regular cycle    Uterine fibroid    History of positive PPD    RBC microcytosis    Encounter for screening colonoscopy    Abnormal uterine bleeding    Anemia    Blurry vision    Intraductal hyperplasia without atypia of right breast    Thyroid enlargement     Past Medical History:   Diagnosis Date    Anemia     Disease of thyroid gland     enlarged thyroid    Intraductal hyperplasia without atypia of right breast 02/2016    c florid and sclerosing adenosis, background dense stromal fibrosis    Iron deficiency anemia due to chronic blood loss     transfused 2015 9/17: ferritin 2   H/H 8 8/31 7%    Menorrhagia with regular cycle     Varicella     without complication     Past Surgical History:   Procedure Laterality Date    BREAST BIOPSY Right 02/2016    intraductal hyperplasia without atypia,fibroid and sclerosing adenosis, and columnar cell change in a bsckround of dense stromal fibrosis no malignancy identified    TUBAL LIGATION      US GUIDED BREAST BIOPSY RIGHT COMPLETE Right 2/19/2016     Family History   Problem Relation Age of Onset   Mavis Cousin Breast cancer Mother         52's passed 78 yo c mets    Anemia Mother     Thyroid disease Mother         total thyroidectomy uncertain reason    Hyperlipidemia Father     Deep vein thrombosis Neg Hx     Mental illness Neg Hx     Hypertension Neg Hx     Coronary artery disease Neg Hx     Diabetes type II Neg Hx      Social History     Social History    Marital status: /Civil Union     Spouse name: N/A    Number of children: N/A    Years of education: N/A     Occupational History    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      Social History Main Topics    Smoking status: Never Smoker    Smokeless tobacco: Never Used    Alcohol use No      Comment: rare    Drug use: No    Sexual activity: Not Asked     Other Topics Concern    None     Social History Narrative    ** Merged History Encounter **    Inadequate exercise    Alevism                Current Outpatient Prescriptions:     albuterol (VENTOLIN HFA) 90 mcg/act inhaler, Inhale 2 puffs every 4 (four) hours as needed for wheezing (or chest tightness), Disp: 18 g, Rfl: 0    benzonatate (TESSALON PERLES) 100 mg capsule, Take 1-2 capsules (100-200 mg total) by mouth 3 (three) times a day as needed for cough, Disp: 30 capsule, Rfl: 0    ferrous sulfate 324 (65 Fe) mg, Take 1 tablet (324 mg total) by mouth 2 (two) times a day before meals, Disp: 60 tablet, Rfl: 5    fluticasone (FLONASE) 50 mcg/act nasal spray, 2 sprays into each nostril daily, Disp: 16 g, Rfl: 0    ibuprofen (MOTRIN) 600 mg tablet, Take 1 tablet (600 mg total) by mouth every 6 (six) hours as needed (pain), Disp: 30 tablet, Rfl: 0    norethindrone (JENCYCLA) 0 35 MG tablet, Take 1 tablet by mouth daily, Disp: , Rfl:     No Known Allergies    Objective:  /70 (BP Location: Left arm, Cuff Size: Standard)   Pulse 73   Temp 97 9 °F (36 6 °C) (Tympanic)   Resp 16   Ht 5' 0 6" (1 539 m)   Wt 65 3 kg (144 lb)   SpO2 97%   BMI 27 57 kg/m²     Physical Exam   Constitutional: She is oriented to person, place, and time  She appears well-developed and well-nourished  No distress  HENT:   Head: Normocephalic and atraumatic  Eyes: Pupils are equal, round, and reactive to light  No scleral icterus  Cardiovascular: Normal rate and regular rhythm  No murmur heard  Pulmonary/Chest: Effort normal  No respiratory distress  Musculoskeletal: She exhibits no edema  Neurological: She is alert and oriented to person, place, and time  Skin: Skin is warm  No rash noted  No pallor  Psychiatric: She has a normal mood and affect   Thought content normal        Result Review  Labs:  Appointment on 11/14/2018   Component Date Value Ref Range Status    WBC 11/14/2018 10 55* 4 31 - 10 16 Thousand/uL Final    RBC 11/14/2018 4 55  3 81 - 5 12 Million/uL Final    Hemoglobin 11/14/2018 12 9  11 5 - 15 4 g/dL Final    Hematocrit 11/14/2018 41 6  34 8 - 46 1 % Final    MCV 11/14/2018 91  82 - 98 fL Final   [facility] 11/14/2018 28 4  26 8 - 34 3 pg Final    MCHC 11/14/2018 31 0* 31 4 - 37 4 g/dL Final    RDW 11/14/2018 13 2  11 6 - 15 1 % Final    MPV 11/14/2018 11 2  8 9 - 12 7 fL Final    Platelets 10/11/1342 317  149 - 390 Thousands/uL Final    nRBC 11/14/2018 0  /100 WBCs Final    Neutrophils Relative 11/14/2018 62  43 - 75 % Final    Immat GRANS % 11/14/2018 0  0 - 2 % Final    Lymphocytes Relative 11/14/2018 29  14 - 44 % Final    Monocytes Relative 11/14/2018 6  4 - 12 % Final    Eosinophils Relative 11/14/2018 2  0 - 6 % Final    Basophils Relative 11/14/2018 1  0 - 1 % Final    Neutrophils Absolute 11/14/2018 6 50  1 85 - 7 62 Thousands/µL Final    Immature Grans Absolute 11/14/2018 0 03  0 00 - 0 20 Thousand/uL Final    Lymphocytes Absolute 11/14/2018 3 09  0 60 - 4 47 Thousands/µL Final    Monocytes Absolute 11/14/2018 0 61  0 17 - 1 22 Thousand/µL Final    Eosinophils Absolute 11/14/2018 0 22  0 00 - 0 61 Thousand/µL Final    Basophils Absolute 11/14/2018 0 10  0 00 - 0 10 Thousands/µL Final    Iron Saturation 11/14/2018 14  % Final    TIBC 11/14/2018 348  250 - 450 ug/dL Final    Iron 11/14/2018 50  50 - 170 ug/dL Final      Patients treated with metal-binding drugs (ie  Deferoxamine) may have depressed iron values      Ferritin 11/14/2018 11  8 - 388 ng/mL Final   Admission on 09/15/2018, Discharged on 09/15/2018   Component Date Value Ref Range Status    EXT PREG TEST UR (Ref: Negative) 09/15/2018 negative   Final    Color, UA 09/15/2018 Straw  Straw, Yellow, Pale Yellow Final    Clarity, UA 09/15/2018 Clear  Clear, Other Final    Specific Gravity, UA 09/15/2018 1 010  1 003 - 1 040 Final    pH, UA 09/15/2018 6 0  4 5 - 8 0 Final    Leukocytes, UA 09/15/2018 Negative  Negative Final    Nitrite, UA 09/15/2018 Negative  Negative Final    Protein, UA 09/15/2018 Negative  Negative mg/dl Final    Glucose, UA 09/15/2018 Negative  Negative mg/dl Final    Ketones, UA 09/15/2018 Negative Negative mg/dl Final    Bilirubin, UA 09/15/2018 Negative  Negative Final    Blood, UA 09/15/2018 250 0* Negative Final    UROBILINOGEN UA 09/15/2018 Negative  1 0, Negative mg/dL Final    WBC 09/15/2018 12 60* 4 50 - 11 00 Thousand/uL Final    RBC 09/15/2018 4 74  4 00 - 5 20 Million/uL Final    Hemoglobin 09/15/2018 14 1  12 0 - 16 0 g/dL Final    Hematocrit 09/15/2018 42 5  36 0 - 46 0 % Final    MCV 09/15/2018 90  80 - 100 fL Final    MCH 09/15/2018 29 7  26 0 - 34 0 pg Final    MCHC 09/15/2018 33 1  31 0 - 36 0 g/dL Final    RDW 09/15/2018 13 8  <15 3 % Final    MPV 09/15/2018 8 9  8 9 - 12 7 fL Final    Platelets 10/15/2880 367  150 - 450 Thousands/uL Final    Neutrophils Relative 09/15/2018 63  45 - 65 % Final    Lymphocytes Relative 09/15/2018 28  20 - 50 % Final    Monocytes Relative 09/15/2018 6  1 - 10 % Final    Eosinophils Relative 09/15/2018 3  0 - 6 % Final    Basophils Relative 09/15/2018 1  0 - 1 % Final    Neutrophils Absolute 09/15/2018 7 90* 1 80 - 7 80 Thousands/µL Final    Lymphocytes Absolute 09/15/2018 3 50  0 50 - 4 00 Thousands/µL Final    Monocytes Absolute 09/15/2018 0 80  0 20 - 0 90 Thousand/µL Final    Eosinophils Absolute 09/15/2018 0 40  0 00 - 0 40 Thousand/µL Final    Basophils Absolute 09/15/2018 0 10  0 00 - 0 10 Thousands/µL Final    Sodium 09/15/2018 142  137 - 147 mmol/L Final    Potassium 09/15/2018 3 8  3 6 - 5 0 mmol/L Final    Chloride 09/15/2018 105  97 - 108 mmol/L Final    CO2 09/15/2018 27  22 - 30 mmol/L Final    ANION GAP 09/15/2018 10  5 - 14 mmol/L Final    BUN 09/15/2018 9  5 - 25 mg/dL Final    Creatinine 09/15/2018 0 81  0 60 - 1 20 mg/dL Final    Standardized to IDMS reference method    Glucose 09/15/2018 103* 70 - 99 mg/dL Final      If the patient is fasting, the ADA then defines impaired fasting glucose as > 100 mg/dL and diabetes as > or equal to 123 mg/dL    Specimen collection should occur prior to Sulfasalazine administration due to the potential for falsely depressed results  Specimen collection should occur prior to Sulfapyridine administration due to the potential for falsely elevated results   Calcium 09/15/2018 10 4* 8 4 - 10 2 mg/dL Final    AST 09/15/2018 20  14 - 36 U/L Final      Specimen collection should occur prior to Sulfasalazine administration due to the potential for falsely depressed results   ALT 09/15/2018 22  9 - 52 U/L Final      Specimen collection should occur prior to Sulfasalazine administration due to the potential for falsely depressed results       Alkaline Phosphatase 09/15/2018 112  43 - 122 U/L Final    Total Protein 09/15/2018 8 7* 5 9 - 8 4 g/dL Final    Albumin 09/15/2018 4 4  3 0 - 5 2 g/dL Final    Total Bilirubin 09/15/2018 0 20  <1 30 mg/dL Final    eGFR 09/15/2018 84  >60 ml/min/1 73sq m Final    Lipase 09/15/2018 113  23 - 300 u/L Final    HCG, Quant 09/15/2018 <3  <=5 mIU/mL Final    N gonorrhoeae, DNA Probe 09/15/2018 N  gonorrhoeae Amplified DNA Negative  N  gonorrhoeae Amplified DNA Negative Final    Chlamydia, DNA Probe 09/15/2018 C  trachomatis Amplified DNA Negative  C  trachomatis Amplified DNA Negative Final    Candida Species 09/15/2018 Negative  Negative Final    Gardnerella vaginalis 09/15/2018 Negative  Negative Final    Trichomonas vaginalis 09/15/2018 Negative  Negative Final    RBC, UA 09/15/2018 Innumerable* None Seen, 0-5 /hpf Final    WBC, UA 09/15/2018 0-1* None Seen, 0-5, 5-55, 5-65 /hpf Final    Epithelial Cells 09/15/2018 Occasional  None Seen, Occasional /hpf Final    Bacteria, UA 09/15/2018 None Seen  None Seen, Occasional /hpf Final   Admission on 09/01/2018, Discharged on 09/01/2018   Component Date Value Ref Range Status    EXT PREG TEST UR (Ref: Negative) 09/01/2018 negative   Final    Color, UA 09/01/2018 Red* Straw, Yellow, Pale Yellow Final    Clarity, UA 09/01/2018 Cloudy* Clear, Other Final    Specific [place], UA 09/01/2018 1 010  1 003 - 1 040 Final    pH, UA 09/01/2018 8 0  4 5 - 8 0 Final    Leukocytes, UA 09/01/2018 Negative  Negative Final    Nitrite, UA 09/01/2018 Negative  Negative Final    Protein, UA 09/01/2018 >=500* Negative mg/dl Final    Glucose, UA 09/01/2018 Negative  Negative mg/dl Final    Ketones, UA 09/01/2018 Negative  Negative mg/dl Final    Bilirubin, UA 09/01/2018 Negative  Negative Final    Blood, UA 09/01/2018 250 0* Negative Final    UROBILINOGEN UA 09/01/2018 Negative  1 0, Negative mg/dL Final    WBC 09/01/2018 12 50* 4 50 - 11 00 Thousand/uL Final    RBC 09/01/2018 4 43  4 00 - 5 20 Million/uL Final    Hemoglobin 09/01/2018 13 0  12 0 - 16 0 g/dL Final    Hematocrit 09/01/2018 39 1  36 0 - 46 0 % Final    MCV 09/01/2018 88  80 - 100 fL Final    MCH 09/01/2018 29 3  26 0 - 34 0 pg Final    MCHC 09/01/2018 33 2  31 0 - 36 0 g/dL Final    RDW 09/01/2018 14 0  <15 3 % Final    MPV 09/01/2018 9 1  8 9 - 12 7 fL Final    Platelets 51/90/4042 277  150 - 450 Thousands/uL Final    Neutrophils Relative 09/01/2018 72* 45 - 65 % Final    Lymphocytes Relative 09/01/2018 21  20 - 50 % Final    Monocytes Relative 09/01/2018 6  1 - 10 % Final    Eosinophils Relative 09/01/2018 1  0 - 6 % Final    Basophils Relative 09/01/2018 1  0 - 1 % Final    Neutrophils Absolute 09/01/2018 8 90* 1 80 - 7 80 Thousands/µL Final    Lymphocytes Absolute 09/01/2018 2 60  0 50 - 4 00 Thousands/µL Final    Monocytes Absolute 09/01/2018 0 70  0 20 - 0 90 Thousand/µL Final    Eosinophils Absolute 09/01/2018 0 10  0 00 - 0 40 Thousand/µL Final    Basophils Absolute 09/01/2018 0 10  0 00 - 0 10 Thousands/µL Final    Sodium 09/01/2018 138  137 - 147 mmol/L Final    Potassium 09/01/2018 3 9  3 6 - 5 0 mmol/L Final    Chloride 09/01/2018 108  97 - 108 mmol/L Final    CO2 09/01/2018 24  22 - 30 mmol/L Final    ANION GAP 09/01/2018 6  5 - 14 mmol/L Final    BUN 09/01/2018 9  5 - 25 mg/dL Final    Creatinine 09/01/2018 0 58* 0 60 - 1 20 mg/dL Final    Standardized to IDMS reference method    Glucose 09/01/2018 98  70 - 99 mg/dL Final      If the patient is fasting, the ADA then defines impaired fasting glucose as > 100 mg/dL and diabetes as > or equal to 123 mg/dL  Specimen collection should occur prior to Sulfasalazine administration due to the potential for falsely depressed results  Specimen collection should occur prior to Sulfapyridine administration due to the potential for falsely elevated results   Calcium 09/01/2018 9 5  8 4 - 10 2 mg/dL Final    AST 09/01/2018 15  14 - 36 U/L Final      Specimen collection should occur prior to Sulfasalazine administration due to the potential for falsely depressed results   ALT 09/01/2018 25  9 - 52 U/L Final      Specimen collection should occur prior to Sulfasalazine administration due to the potential for falsely depressed results       Alkaline Phosphatase 09/01/2018 103  43 - 122 U/L Final    Total Protein 09/01/2018 7 4  5 9 - 8 4 g/dL Final    Albumin 09/01/2018 3 6  3 0 - 5 2 g/dL Final    Total Bilirubin 09/01/2018 0 20  <1 30 mg/dL Final    eGFR 09/01/2018 106  >60 ml/min/1 73sq m Final    Lipase 09/01/2018 69  23 - 300 u/L Final    N gonorrhoeae, DNA Probe 09/01/2018 N  gonorrhoeae Amplified DNA Negative  N  gonorrhoeae Amplified DNA Negative Final    Chlamydia, DNA Probe 09/01/2018 C  trachomatis Amplified DNA Negative  C  trachomatis Amplified DNA Negative Final    Candida Species 09/01/2018 Negative  Negative Final    Gardnerella vaginalis 09/01/2018 Negative  Negative Final    Trichomonas vaginalis 09/01/2018 Negative  Negative Final    RBC, UA 09/01/2018 Innumerable* None Seen, 0-5 /hpf Final    WBC, UA 09/01/2018 0-1* None Seen, 0-5, 5-55, 5-65 /hpf Final    Epithelial Cells 09/01/2018 Occasional  None Seen, Occasional /hpf Final    Bacteria, UA 09/01/2018 Occasional  None Seen, Occasional /hpf Final    RBC Morphology 09/01/2018 Normal   Final    Platelet Estimate 27/55/8401 Adequate  Adequate Final     Appointment on 11/14/2018   Component Date Value Ref Range Status    WBC 11/14/2018 10 55* 4 31 - 10 16 Thousand/uL Final    RBC 11/14/2018 4 55  3 81 - 5 12 Million/uL Final    Hemoglobin 11/14/2018 12 9  11 5 - 15 4 g/dL Final    Hematocrit 11/14/2018 41 6  34 8 - 46 1 % Final    MCV 11/14/2018 91  82 - 98 fL Final    MCH 11/14/2018 28 4  26 8 - 34 3 pg Final    MCHC 11/14/2018 31 0* 31 4 - 37 4 g/dL Final    RDW 11/14/2018 13 2  11 6 - 15 1 % Final    MPV 11/14/2018 11 2  8 9 - 12 7 fL Final    Platelets 27/86/9387 317  149 - 390 Thousands/uL Final    nRBC 11/14/2018 0  /100 WBCs Final    Neutrophils Relative 11/14/2018 62  43 - 75 % Final    Immat GRANS % 11/14/2018 0  0 - 2 % Final    Lymphocytes Relative 11/14/2018 29  14 - 44 % Final    Monocytes Relative 11/14/2018 6  4 - 12 % Final    Eosinophils Relative 11/14/2018 2  0 - 6 % Final    Basophils Relative 11/14/2018 1  0 - 1 % Final    Neutrophils Absolute 11/14/2018 6 50  1 85 - 7 62 Thousands/µL Final    Immature Grans Absolute 11/14/2018 0 03  0 00 - 0 20 Thousand/uL Final    Lymphocytes Absolute 11/14/2018 3 09  0 60 - 4 47 Thousands/µL Final    Monocytes Absolute 11/14/2018 0 61  0 17 - 1 22 Thousand/µL Final    Eosinophils Absolute 11/14/2018 0 22  0 00 - 0 61 Thousand/µL Final    Basophils Absolute 11/14/2018 0 10  0 00 - 0 10 Thousands/µL Final    Iron Saturation 11/14/2018 14  % Final    TIBC 11/14/2018 348  250 - 450 ug/dL Final    Iron 11/14/2018 50  50 - 170 ug/dL Final      Patients treated with metal-binding drugs (ie  Deferoxamine) may have depressed iron values   Ferritin 11/14/2018 11  8 - 388 ng/mL Final       Imaging:     Please note: This report has been generated by a voice recognition software system  Therefore there may be syntax, spelling, and/or grammatical errors  Please call if you have any questions

## 2018-11-15 NOTE — PROGRESS NOTES
[id number] [facility]  [address] [facility] [address]  [facility] [address] [facility]  [phone]  Hematology Ambulatory Follow-Up  Harshil Sanderson, 1965, [id number]  11/15/2018    Assessment/Plan:    1  Iron deficiency anemia secondary to menorrhagia  Patient previously completed IV iron in May 2018  Patient was instructed to continue with oral iron regiment twice a day  However follow-up blood work at the 3 month interval demonstrated significant decrease in iron saturation and ferritin  At this time I have advised the patient to undergo repeat replacement with IV iron  Patient did not have any complications with IV Feraheme and therefore this has been recommended again  Regimen:  Feraheme 510 mg IV weekly x2 doses  Patient will likely undergo uterine ablation in December 2018, to control bleeding  Patient was instructed to follow up in 3 months with blood work prior  Patient understands call the office with any questions or concerns  - CBC and differential; Future  - Iron Panel; Future      2  Leukocytosis  Differential remains within normal limits  No additional recommendations for evaluation at this time  CBC will be monitored at the 3 month interval as stated above  Labs and imaging for follow up:  Orders Placed This Encounter   Procedures    CBC and differential     This is a patient instruction: This test is non-fasting  Please drink two glasses of water morning of bloodwork  Standing Status:   Future     Standing Expiration Date:   11/15/2019       Patient knows to call the Hematology/Oncology office with any questions and concerns regarding the above  Carefully review your medication list in verify the list is accurate and up-to-date    Please call the hematologic/oncology office if there medications missing from the less, medications on the list that your not currently taking or if there is a dosage or instruction that is different from higher taking medication  I have spent 25 minutes with Patient  today in which greater than 50% of this time was spent in counseling/coordination of care regarding Diagnostic results, Prognosis and Impressions  Barrier(s) to care: None  The patient is able to self care     -------------------------------------------------------------------------------------------------------    Chief complaint:   Chief Complaint   Patient presents with    Follow-up     3 months follow up with labs       History of present illness: This is a 47year old female with long standing iron deficiency who has been taking oral iron supplements for several months/years prescribed by gynecologist secondary to heavy menstruation who presents to the Hematology office for evaluation of microcytic anemia      Patient notes that earlier this year she underwent an endometrial biopsy-2/22/18-negative for malignancy   Patient notes that she has been on a progesterone only pill, and has achieved later periods without clots  [address] evaluation was completed in May which demonstrated a hemoglobin decreased compared to that in February however RDW and MCV showed progressive signs of iron deficiency   Patient was referred to Hematology in May 2018 to discuss IV iron supplementation      Patient underwent IV Feraheme infusion x2 doses in May 2018  Patient tolerated doses without significant side effect  Patient has appreciated a significant response with iron saturation improved to 19% and ferritin at 89 ng/dL  Patient also followed up with gynecology who was able to start the patient on progesterone only supplements  Patient has noted a significant decrease in the amount of menstrual blood and perimenstural/ menstrual symptoms  Interval history:  Patient notes that over the past several months since her last evaluation in August she continues to have vaginal bleeding   Patient discussed with her physician about additional uterine of lesions  Patient notes she continues to take oral iron  Iron studies demonstrate a decreased from previous baseline  Review of Systems   Constitutional: Positive for fatigue  Negative for appetite change, fever and unexpected weight change  HENT: Negative for nosebleeds  Respiratory: Negative for cough, choking and shortness of breath  Negative hemoptysis  Cardiovascular: Negative for chest pain, palpitations and leg swelling  Gastrointestinal: Negative  Negative for abdominal distention, abdominal pain, anal bleeding, blood in stool, constipation, diarrhea, nausea and vomiting  Endocrine: Negative  Negative for cold intolerance  Genitourinary: Positive for vaginal bleeding  Negative for hematuria, menstrual problem, vaginal discharge and vaginal pain  Musculoskeletal: Negative  Negative for arthralgias, myalgias, neck pain and neck stiffness  Skin: Negative  Negative for color change, pallor and rash  Allergic/Immunologic: Negative  Negative for immunocompromised state  Neurological: Negative  Negative for weakness and headaches  Hematological: Negative for adenopathy  Does not bruise/bleed easily  All other systems reviewed and are negative  Patient Active Problem List   Diagnosis    Iron deficiency anemia due to chronic blood loss    Menorrhagia with regular cycle    Uterine fibroid    History of positive PPD    RBC microcytosis    Encounter for screening colonoscopy    Abnormal uterine bleeding    Anemia    Blurry vision    Intraductal hyperplasia without atypia of right breast    Thyroid enlargement     Past Medical History:   Diagnosis Date    Anemia     Disease of thyroid gland     enlarged thyroid    Intraductal hyperplasia without atypia of right breast 02/2016    c florid and sclerosing adenosis, background dense stromal fibrosis    Iron deficiency anemia due to chronic blood loss     transfused 2015 9/17: ferritin 2   H/H 8 8/31 7%    Menorrhagia with regular cycle     Varicella     without complication     Past Surgical History:   Procedure Laterality Date    BREAST BIOPSY Right 02/2016    intraductal hyperplasia without atypia,fibroid and sclerosing adenosis, and columnar cell change in a bsckround of dense stromal fibrosis no malignancy identified    TUBAL LIGATION      US GUIDED BREAST BIOPSY RIGHT COMPLETE Right 2/19/2016     Family History   Problem Relation Age of Onset   [facility] Breast cancer Mother         52's passed 76 yo c mets    Anemia Mother     Thyroid disease Mother         total thyroidectomy uncertain reason    Hyperlipidemia Father     Deep vein thrombosis Neg Hx     Mental illness Neg Hx     Hypertension Neg Hx     Coronary artery disease Neg Hx     Diabetes type II Neg Hx      Social History     Social History    Marital status: /Civil Union     Spouse name: N/A    Number of children: N/A    Years of education: N/A     Occupational History    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      Social History Main Topics    Smoking status: Never Smoker    Smokeless tobacco: Never Used    Alcohol use No      Comment: rare    Drug use: No    Sexual activity: Not Asked     Other Topics Concern    None     Social History Narrative    ** Merged History Encounter **    Inadequate exercise    Sabianism                Current Outpatient Prescriptions:     albuterol (VENTOLIN HFA) 90 mcg/act inhaler, Inhale 2 puffs every 4 (four) hours as needed for wheezing (or chest tightness), Disp: 18 g, Rfl: 0    benzonatate (TESSALON PERLES) 100 mg capsule, Take 1-2 capsules (100-200 mg total) by mouth 3 (three) times a day as needed for cough, Disp: 30 capsule, Rfl: 0    ferrous sulfate 324 (65 Fe) mg, Take 1 tablet (324 mg total) by mouth 2 (two) times a day before meals, Disp: 60 tablet, Rfl: 5    fluticasone (FLONASE) 50 mcg/act nasal spray, 2 sprays into each nostril daily, Disp: 16 g, Rfl: 0    ibuprofen (MOTRIN) 600 mg tablet, Take 1 tablet (600 mg total) by mouth every 6 (six) hours as needed (pain), Disp: 30 tablet, Rfl: 0    norethindrone (JENCYCLA) 0 35 MG tablet, Take 1 tablet by mouth daily, Disp: , Rfl:     No Known Allergies    Objective:  /70 (BP Location: Left arm, Cuff Size: Standard)   Pulse 73   Temp 97 9 °F (36 6 °C) (Tympanic)   Resp 16   Ht 5' 0 6" (1 539 m)   Wt 65 3 kg (144 lb)   SpO2 97%   BMI 27 57 kg/m²    Physical Exam   Constitutional: She is oriented to person, place, and time  She appears well-developed and well-nourished  No distress  HENT:   Head: Normocephalic and atraumatic  Eyes: Pupils are equal, round, and reactive to light  No scleral icterus  Cardiovascular: Normal rate and regular rhythm  No murmur heard  Pulmonary/Chest: Effort normal  No respiratory distress  Musculoskeletal: She exhibits no edema  Neurological: She is alert and oriented to person, place, and time  Skin: Skin is warm  No rash noted  No pallor  Psychiatric: She has a normal mood and affect   Thought content normal        Result Review  Labs:  Appointment on 11/14/2018   Component Date Value Ref Range Status    WBC 11/14/2018 10 55* 4 31 - 10 16 Thousand/uL Final    RBC 11/14/2018 4 55  3 81 - 5 12 Million/uL Final    Hemoglobin 11/14/2018 12 9  11 5 - 15 4 g/dL Final    Hematocrit 11/14/2018 41 6  34 8 - 46 1 % Final    MCV 11/14/2018 91  82 - 98 fL Final    MCH 11/14/2018 28 4  26 8 - 34 3 pg Final    MCHC 11/14/2018 31 0* 31 4 - 37 4 g/dL Final    RDW 11/14/2018 13 2  11 6 - 15 1 % Final    MPV 11/14/2018 11 2  8 9 - 12 7 fL Final    Platelets 22/68/6897 317  149 - 390 Thousands/uL Final    nRBC 11/14/2018 0  /100 WBCs Final    Neutrophils Relative 11/14/2018 62  43 - 75 % Final    Immat GRANS % 11/14/2018 0  0 - 2 % Final    Lymphocytes Relative 11/14/2018 29  14 - 44 % Final    Monocytes Relative 11/14/2018 6  4 - 12 % Final    Eosinophils Relative 11/14/2018 2  0 - 6 % Final    Basophils Relative 11/14/2018 1  0 - 1 % Final    Neutrophils Absolute 11/14/2018 6 50  1 85 - 7 62 Thousands/µL Final    Immature Grans Absolute 11/14/2018 0 03  0 00 - 0 20 Thousand/uL Final    Lymphocytes Absolute 11/14/2018 3 09  0 60 - 4 47 Thousands/µL Final    Monocytes Absolute 11/14/2018 0 61  0 17 - 1 22 Thousand/µL Final    Eosinophils Absolute 11/14/2018 0 22  0 00 - 0 61 Thousand/µL Final    Basophils Absolute 11/14/2018 0 10  0 00 - 0 10 Thousands/µL Final    Iron Saturation 11/14/2018 14  % Final    TIBC 11/14/2018 348  250 - 450 ug/dL Final    Iron 11/14/2018 50  50 - 170 ug/dL Final      Patients treated with metal-binding drugs (ie  Deferoxamine) may have depressed iron values      Ferritin 11/14/2018 11  8 - 388 ng/mL Final   Admission on 09/15/2018, Discharged on 09/15/2018   Component Date Value Ref Range Status    EXT PREG TEST UR (Ref: Negative) 09/15/2018 negative   Final    Color, UA 09/15/2018 Straw  Straw, Yellow, Pale Yellow Final    Clarity, UA 09/15/2018 Clear  Clear, Other Final    Specific Gravity, UA 09/15/2018 1 010  1 003 - 1 040 Final    pH, UA 09/15/2018 6 0  4 5 - 8 0 Final    Leukocytes, UA 09/15/2018 Negative  Negative Final    Nitrite, UA 09/15/2018 Negative  Negative Final    Protein, UA 09/15/2018 Negative  Negative mg/dl Final    Glucose, UA 09/15/2018 Negative  Negative mg/dl Final    Ketones, UA 09/15/2018 Negative  Negative mg/dl Final    Bilirubin, UA 09/15/2018 Negative  Negative Final    Blood, UA 09/15/2018 250 0* Negative Final    UROBILINOGEN UA 09/15/2018 Negative  1 0, Negative mg/dL Final    WBC 09/15/2018 12 60* 4 50 - 11 00 Thousand/uL Final    RBC 09/15/2018 4 74  4 00 - 5 20 Million/uL Final    Hemoglobin 09/15/2018 14 1  12 0 - 16 0 g/dL Final    Hematocrit 09/15/2018 42 5  36 0 - 46 0 % Final    MCV 09/15/2018 90  80 - 100 fL Final    MCH 09/15/2018 29 7  26 0 - 34 0 pg Final    MCHC 09/15/2018 33 1  31 0 - 36 0 g/dL Final    RDW 09/15/2018 13 8  <15 3 % Final    MPV 09/15/2018 8 9  8 9 - 12 7 fL Final    Platelets 87/84/1804 367  150 - 450 Thousands/uL Final    Neutrophils Relative 09/15/2018 63  45 - 65 % Final    Lymphocytes Relative 09/15/2018 28  20 - 50 % Final    Monocytes Relative 09/15/2018 6  1 - 10 % Final    Eosinophils Relative 09/15/2018 3  0 - 6 % Final    Basophils Relative 09/15/2018 1  0 - 1 % Final    Neutrophils Absolute 09/15/2018 7 90* 1 80 - 7 80 Thousands/µL Final    Lymphocytes Absolute 09/15/2018 3 50  0 50 - 4 00 Thousands/µL Final    Monocytes Absolute 09/15/2018 0 80  0 20 - 0 90 Thousand/µL Final    Eosinophils Absolute 09/15/2018 0 40  0 00 - 0 40 Thousand/µL Final    Basophils Absolute 09/15/2018 0 10  0 00 - 0 10 Thousands/µL Final    Sodium 09/15/2018 142  137 - 147 mmol/L Final    Potassium 09/15/2018 3 8  3 6 - 5 0 mmol/L Final    Chloride 09/15/2018 105  97 - 108 mmol/L Final    CO2 09/15/2018 27  22 - 30 mmol/L Final    ANION GAP 09/15/2018 10  5 - 14 mmol/L Final    BUN 09/15/2018 9  5 - 25 mg/dL Final    Creatinine 09/15/2018 0 81  0 60 - 1 20 mg/dL Final    Standardized to IDMS reference method    Glucose 09/15/2018 103* 70 - 99 mg/dL Final      If the patient is fasting, the ADA then defines impaired fasting glucose as > 100 mg/dL and diabetes as > or equal to 123 mg/dL  Specimen collection should occur prior to Sulfasalazine administration due to the potential for falsely depressed results  Specimen collection should occur prior to Sulfapyridine administration due to the potential for falsely elevated results   Calcium 09/15/2018 10 4* 8 4 - 10 2 mg/dL Final    AST 09/15/2018 20  14 - 36 U/L Final      Specimen collection should occur prior to Sulfasalazine administration due to the potential for falsely depressed results       ALT 09/15/2018 22  9 - 52 U/L Final      Specimen collection should occur prior to Sulfasalazine administration due to the potential for falsely depressed results       Alkaline Phosphatase 09/15/2018 112  43 - 122 U/L Final    Total Protein 09/15/2018 8 7* 5 9 - 8 4 g/dL Final    Albumin 09/15/2018 4 4  3 0 - 5 2 g/dL Final    Total Bilirubin 09/15/2018 0 20  <1 30 mg/dL Final    eGFR 09/15/2018 84  >60 ml/min/1 73sq m Final    Lipase 09/15/2018 113  23 - 300 u/L Final    HCG, Quant 09/15/2018 <3  <=5 mIU/mL Final    N gonorrhoeae, DNA Probe 09/15/2018 N  gonorrhoeae Amplified DNA Negative  N  gonorrhoeae Amplified DNA Negative Final    Chlamydia, DNA Probe 09/15/2018 C  trachomatis Amplified DNA Negative  C  trachomatis Amplified DNA Negative Final    Candida Species 09/15/2018 Negative  Negative Final    Gardnerella vaginalis 09/15/2018 Negative  Negative Final    Trichomonas vaginalis 09/15/2018 Negative  Negative Final    RBC, UA 09/15/2018 Innumerable* None Seen, 0-5 /hpf Final    WBC, UA 09/15/2018 0-1* None Seen, 0-5, 5-55, 5-65 /hpf Final    Epithelial Cells 09/15/2018 Occasional  None Seen, Occasional /hpf Final    Bacteria, UA 09/15/2018 None Seen  None Seen, Occasional /hpf Final   Admission on 09/01/2018, Discharged on 09/01/2018   Component Date Value Ref Range Status    EXT PREG TEST UR (Ref: Negative) 09/01/2018 negative   Final    Color, UA 09/01/2018 Red* Straw, Yellow, Pale Yellow Final    Clarity, UA 09/01/2018 Cloudy* Clear, Other Final    Specific Gravity, UA 09/01/2018 1 010  1 003 - 1 040 Final    pH, UA 09/01/2018 8 0  4 5 - 8 0 Final    Leukocytes, UA 09/01/2018 Negative  Negative Final    Nitrite, UA 09/01/2018 Negative  Negative Final    Protein, UA 09/01/2018 >=500* Negative mg/dl Final    Glucose, UA 09/01/2018 Negative  Negative mg/dl Final    Ketones, UA 09/01/2018 Negative  Negative mg/dl Final    Bilirubin, UA 09/01/2018 Negative  Negative Final    Blood, UA 09/01/2018 250 0* Negative Final    UROBILINOGEN UA 09/01/2018 Negative  1 0, Negative mg/dL Final    WBC 09/01/2018 12 50* 4 50 - 11 00 Thousand/uL Final    RBC 09/01/2018 4 43  4 00 - 5 20 Million/uL Final    Hemoglobin 09/01/2018 13 0  12 0 - 16 0 g/dL Final    Hematocrit 09/01/2018 39 1  36 0 - 46 0 % Final    MCV 09/01/2018 88  80 - 100 fL Final    MCH 09/01/2018 29 3  26 0 - 34 0 pg Final    MCHC 09/01/2018 33 2  31 0 - 36 0 g/dL Final    RDW 09/01/2018 14 0  <15 3 % Final    MPV 09/01/2018 9 1  8 9 - 12 7 fL Final    Platelets 05/23/9100 277  150 - 450 Thousands/uL Final    Neutrophils Relative 09/01/2018 72* 45 - 65 % Final    Lymphocytes Relative 09/01/2018 21  20 - 50 % Final    Monocytes Relative 09/01/2018 6  1 - 10 % Final    Eosinophils Relative 09/01/2018 1  0 - 6 % Final    Basophils Relative 09/01/2018 1  0 - 1 % Final    Neutrophils Absolute 09/01/2018 8 90* 1 80 - 7 80 Thousands/µL Final    Lymphocytes Absolute 09/01/2018 2 60  0 50 - 4 00 Thousands/µL Final    Monocytes Absolute 09/01/2018 0 70  0 20 - 0 90 Thousand/µL Final    Eosinophils Absolute 09/01/2018 0 10  0 00 - 0 40 Thousand/µL Final    Basophils Absolute 09/01/2018 0 10  0 00 - 0 10 Thousands/µL Final    Sodium 09/01/2018 138  137 - 147 mmol/L Final    Potassium 09/01/2018 3 9  3 6 - 5 0 mmol/L Final    Chloride 09/01/2018 108  97 - 108 mmol/L Final    CO2 09/01/2018 24  22 - 30 mmol/L Final    ANION GAP 09/01/2018 6  5 - 14 mmol/L Final    BUN 09/01/2018 9  5 - 25 mg/dL Final    Creatinine 09/01/2018 0 58* 0 60 - 1 20 mg/dL Final    Standardized to IDMS reference method    Glucose 09/01/2018 98  70 - 99 mg/dL Final      If the patient is fasting, the ADA then defines impaired fasting glucose as > 100 mg/dL and diabetes as > or equal to 123 mg/dL  Specimen collection should occur prior to Sulfasalazine administration due to the potential for falsely depressed results  Specimen collection should occur prior to Sulfapyridine administration due to the potential for falsely elevated results      Calcium 09/01/2018 9 5  8 4 - 10 2 mg/dL Final    AST 09/01/2018 15  14 - 36 U/L Final      Specimen collection should occur prior to Sulfasalazine administration due to the potential for falsely depressed results   ALT 09/01/2018 25  9 - 52 U/L Final      Specimen collection should occur prior to Sulfasalazine administration due to the potential for falsely depressed results       Alkaline Phosphatase 09/01/2018 103  43 - 122 U/L Final    Total Protein 09/01/2018 7 4  5 9 - 8 4 g/dL Final    Albumin 09/01/2018 3 6  3 0 - 5 2 g/dL Final    Total Bilirubin 09/01/2018 0 20  <1 30 mg/dL Final    eGFR 09/01/2018 106  >60 ml/min/1 73sq m Final    Lipase 09/01/2018 69  23 - 300 u/L Final    N gonorrhoeae, DNA Probe 09/01/2018 N  gonorrhoeae Amplified DNA Negative  N  gonorrhoeae Amplified DNA Negative Final    Chlamydia, DNA Probe 09/01/2018 C  trachomatis Amplified DNA Negative  C  trachomatis Amplified DNA Negative Final    Candida Species 09/01/2018 Negative  Negative Final    Gardnerella vaginalis 09/01/2018 Negative  Negative Final    Trichomonas vaginalis 09/01/2018 Negative  Negative Final    RBC, UA 09/01/2018 Innumerable* None Seen, 0-5 /hpf Final    WBC, UA 09/01/2018 0-1* None Seen, 0-5, 5-55, 5-65 /hpf Final    Epithelial Cells 09/01/2018 Occasional  None Seen, Occasional /hpf Final    Bacteria, UA 09/01/2018 Occasional  None Seen, Occasional /hpf Final    RBC Morphology 09/01/2018 Normal   Final    Platelet Estimate 49/22/7704 Adequate  Adequate Final     Appointment on 11/14/2018   Component Date Value Ref Range Status    WBC 11/14/2018 10 55* 4 31 - 10 16 Thousand/uL Final    RBC 11/14/2018 4 55  3 81 - 5 12 Million/uL Final    Hemoglobin 11/14/2018 12 9  11 5 - 15 4 g/dL Final    Hematocrit 11/14/2018 41 6  34 8 - 46 1 % Final    MCV 11/14/2018 91  82 - 98 fL Final    MCH 11/14/2018 28 4  26 8 - 34 3 pg Final    MCHC 11/14/2018 31 0* 31 4 - 37 4 g/dL Final    RDW 11/14/2018 13 2  11 6 - 15 1 % Final    MPV 11/14/2018 11 2  8 9 - 12 7 fL Final    Platelets 74/32/4813 317  149 - 390 Thousands/uL Final    nRBC 11/14/2018 0  /100 WBCs Final    Neutrophils Relative 11/14/2018 62  43 - 75 % Final    Immat GRANS % 11/14/2018 0  0 - 2 % Final    Lymphocytes Relative 11/14/2018 29  14 - 44 % Final    Monocytes Relative 11/14/2018 6  4 - 12 % Final    Eosinophils Relative 11/14/2018 2  0 - 6 % Final    Basophils Relative 11/14/2018 1  0 - 1 % Final    Neutrophils Absolute 11/14/2018 6 50  1 85 - 7 62 Thousands/µL Final    Immature Grans Absolute 11/14/2018 0 03  0 00 - 0 20 Thousand/uL Final    Lymphocytes Absolute 11/14/2018 3 09  0 60 - 4 47 Thousands/µL Final    Monocytes Absolute 11/14/2018 0 61  0 17 - 1 22 Thousand/µL Final    Eosinophils Absolute 11/14/2018 0 22  0 00 - 0 61 Thousand/µL Final    Basophils Absolute 11/14/2018 0 10  0 00 - 0 10 Thousands/µL Final    Iron Saturation 11/14/2018 14  % Final    TIBC 11/14/2018 348  250 - 450 ug/dL Final    Iron 11/14/2018 50  50 - 170 ug/dL Final      Patients treated with metal-binding drugs (ie  Deferoxamine) may have depressed iron values   Ferritin 11/14/2018 11  8 - 388 ng/mL Final       Imaging:     Please note: This report has been generated by a voice recognition software system  Therefore there may be syntax, spelling, and/or grammatical errors  Please call if you have any questions

## 2018-11-15 NOTE — LETTER
November 15, 2018     Mat Salomon, [facility]  [address] [facility]    Patient: Samantha Feliz   YOB: 1965   Date of Visit: 11/15/2018       Dear Dr Nito Aviles: Thank you for referring Harshil Sanderson to me for evaluation  Below are my notes for this consultation  If you have questions, please do not hesitate to call me  I look forward to following your patient along with you  Sincerely,        Kiki Jenkins PA-C        CC: No Recipients  Kiki Jenkins PA-C  11/15/2018  1:17 PM  Sign at close encounter  [place]  [address] [facility] [address] [place] [address] [facility]  [phone]  Hematology Ambulatory Follow-Up  Harshil Sanderson, 1965, [id number]  11/15/2018    Assessment/Plan:    1  Iron deficiency anemia secondary to menorrhagia  Patient previously completed IV iron in May 2018  Patient was instructed to continue with oral iron regiment twice a day  However follow-up blood work at the 3 month interval demonstrated significant decrease in iron saturation and ferritin  At this time I have advised the patient to undergo repeat replacement with IV iron  Patient did not have any complications with IV Feraheme and therefore this has been recommended again  Regimen:  Feraheme 510 mg IV weekly x2 doses  Patient will likely undergo uterine ablation in December 2018, to control bleeding  Patient was instructed to follow up in 3 months with blood work prior  Patient understands call the office with any questions or concerns  - CBC and differential; Future  - Iron Panel; Future      2  Leukocytosis  Differential remains within normal limits  No additional recommendations for evaluation at this time  CBC will be monitored at the 3 month interval as stated above      Labs and imaging for follow up:  Orders Placed This Encounter   Procedures    CBC and differential     This is a patient instruction: This test is non-fasting  Please drink two glasses of water morning of bloodwork  Standing Status:   Future     Standing Expiration Date:   11/15/2019       The patient is scheduled for follow-up in approximately *** with Dr Sadie Malave Patient*** voiced agreement and understanding to the above  Patient*** knows to call the Hematology/Oncology office with any questions and concerns regarding the above  Carefully review your medication list in verify the list is accurate and up-to-date  Please call the hematologic/oncology office if there medications missing from the less, medications on the list that your not currently taking or if there is a dosage or instruction that is different from higher taking medication  I have spent *** minutes with {Patient /Family:81078} today in which greater than 50% of this time was spent in counseling/coordination of care regarding {AMB Counseling Topics:[id number]}  The patient's current treatment goals are ***  Barrier(s) to care: None***  The patient is *** able to self care     -------------------------------------------------------------------------------------------------------    Chief complaint:   Chief Complaint   Patient presents with    Follow-up     3 months follow up with labs       History of present illness:   This is a 47year old female with long standing iron deficiency who has been taking oral iron supplements for several months/years prescribed by gynecologist secondary to heavy menstruation who presents to the Hematology office for evaluation of microcytic anemia      Patient notes that earlier this year she underwent an endometrial biopsy-2/22/18-negative for malignancy   Patient notes that she has been on a progesterone only pill, and has achieved later periods without clots  [address] evaluation was completed in May which demonstrated a hemoglobin decreased compared to that in February however RDW and MCV showed progressive signs of iron deficiency   Patient was referred to Hematology in May 2018 to discuss IV iron supplementation      Patient underwent IV Feraheme infusion x2 doses in May 2018  Patient tolerated doses without significant side effect  Patient has appreciated a significant response with iron saturation improved to 19% and ferritin at 89 ng/dL  Patient also followed up with gynecology who was able to start the patient on progesterone only supplements  Patient has noted a significant decrease in the amount of menstrual blood and perimenstural/ menstrual symptoms  Interval history:  Patient notes that over the past several months since her last evaluation in August she continues to have vaginal bleeding  Patient discussed with her physician about additional uterine of lesions  Patient notes she continues to take oral iron  Iron studies demonstrate a decreased from previous baseline  Review of Systems   Constitutional: Positive for fatigue  Negative for appetite change, fever and unexpected weight change  HENT: Negative for nosebleeds  Respiratory: Negative for cough, choking and shortness of breath  Negative hemoptysis  Cardiovascular: Negative for chest pain, palpitations and leg swelling  Gastrointestinal: Negative  Negative for abdominal distention, abdominal pain, anal bleeding, blood in stool, constipation, diarrhea, nausea and vomiting  Endocrine: Negative  Negative for cold intolerance  Genitourinary: Positive for vaginal bleeding  Negative for hematuria, menstrual problem, vaginal discharge and vaginal pain  Musculoskeletal: Negative  Negative for arthralgias, myalgias, neck pain and neck stiffness  Skin: Negative  Negative for color change, pallor and rash  Allergic/Immunologic: Negative  Negative for immunocompromised state  Neurological: Negative  Negative for weakness and headaches  Hematological: Negative for adenopathy  Does not bruise/bleed easily     All other systems reviewed and are negative  Patient Active Problem List   Diagnosis    Iron deficiency anemia due to chronic blood loss    Menorrhagia with regular cycle    Uterine fibroid    History of positive PPD    RBC microcytosis    Encounter for screening colonoscopy    Abnormal uterine bleeding    Anemia    Blurry vision    Intraductal hyperplasia without atypia of right breast    Thyroid enlargement     Past Medical History:   Diagnosis Date    Anemia     Disease of thyroid gland     enlarged thyroid    Intraductal hyperplasia without atypia of right breast 02/2016    c florid and sclerosing adenosis, background dense stromal fibrosis    Iron deficiency anemia due to chronic blood loss     transfused 2015 9/17: ferritin 2   H/H 8 8/31 7%    Menorrhagia with regular cycle     Varicella     without complication     Past Surgical History:   Procedure Laterality Date    BREAST BIOPSY Right 02/2016    intraductal hyperplasia without atypia,fibroid and sclerosing adenosis, and columnar cell change in a bsckround of dense stromal fibrosis no malignancy identified    TUBAL LIGATION      US GUIDED BREAST BIOPSY RIGHT COMPLETE Right 2/19/2016     Family History   Problem Relation Age of Onset   [facility] Breast cancer Mother         52's passed 76 yo c mets    Anemia Mother     Thyroid disease Mother         total thyroidectomy uncertain reason    Hyperlipidemia Father     Deep vein thrombosis Neg Hx     Mental illness Neg Hx     Hypertension Neg Hx     Coronary artery disease Neg Hx     Diabetes type II Neg Hx      Social History     Social History    Marital status: /Civil Union     Spouse name: N/A    Number of children: N/A    Years of education: N/A     Occupational History    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      Social History Main Topics    Smoking status: Never Smoker    Smokeless tobacco: Never Used    Alcohol use No      Comment: rare    Drug use: No    Sexual activity: Not Asked     Other Topics Concern    None     Social History Narrative    ** Merged History Encounter **    Inadequate exercise    [employer]                Current Outpatient Prescriptions:     albuterol (VENTOLIN HFA) 90 mcg/act inhaler, Inhale 2 puffs every 4 (four) hours as needed for wheezing (or chest tightness), Disp: 18 g, Rfl: 0    benzonatate (TESSALON PERLES) 100 mg capsule, Take 1-2 capsules (100-200 mg total) by mouth 3 (three) times a day as needed for cough, Disp: 30 capsule, Rfl: 0    ferrous sulfate 324 (65 Fe) mg, Take 1 tablet (324 mg total) by mouth 2 (two) times a day before meals, Disp: 60 tablet, Rfl: 5    fluticasone (FLONASE) 50 mcg/act nasal spray, 2 sprays into each nostril daily, Disp: 16 g, Rfl: 0    ibuprofen (MOTRIN) 600 mg tablet, Take 1 tablet (600 mg total) by mouth every 6 (six) hours as needed (pain), Disp: 30 tablet, Rfl: 0    norethindrone (JENCYCLA) 0 35 MG tablet, Take 1 tablet by mouth daily, Disp: , Rfl:     No Known Allergies    Objective:  /70 (BP Location: Left arm, Cuff Size: Standard)   Pulse 73   Temp 97 9 °F (36 6 °C) (Tympanic)   Resp 16   Ht 5' 0 6" (1 539 m)   Wt 65 3 kg (144 lb)   SpO2 97%   BMI 27 57 kg/m²     Physical Exam   Constitutional: She is oriented to person, place, and time  She appears well-developed and well-nourished  No distress  HENT:   Head: Normocephalic and atraumatic  Eyes: Pupils are equal, round, and reactive to light  No scleral icterus  Cardiovascular: Normal rate and regular rhythm  No murmur heard  Pulmonary/Chest: Effort normal  No respiratory distress  Musculoskeletal: She exhibits no edema  Neurological: She is alert and oriented to person, place, and time  Skin: Skin is warm  No rash noted  No pallor  Psychiatric: She has a normal mood and affect   Thought content normal        Result Review  Labs:  Appointment on 11/14/2018   Component Date Value Ref Range Status    WBC 11/14/2018 10 55* 4 31 - 10 16 Thousand/uL Final    RBC 11/14/2018 4 55  3 81 - 5 12 Million/uL Final    Hemoglobin 11/14/2018 12 9  11 5 - 15 4 g/dL Final    Hematocrit 11/14/2018 41 6  34 8 - 46 1 % Final    MCV 11/14/2018 91  82 - 98 fL Final    MCH 11/14/2018 28 4  26 8 - 34 3 pg Final    MCHC 11/14/2018 31 0* 31 4 - 37 4 g/dL Final    RDW 11/14/2018 13 2  11 6 - 15 1 % Final    MPV 11/14/2018 11 2  8 9 - 12 7 fL Final    Platelets 00/53/1356 317  149 - 390 Thousands/uL Final    nRBC 11/14/2018 0  /100 WBCs Final    Neutrophils Relative 11/14/2018 62  43 - 75 % Final    Immat GRANS % 11/14/2018 0  0 - 2 % Final    Lymphocytes Relative 11/14/2018 29  14 - 44 % Final    Monocytes Relative 11/14/2018 6  4 - 12 % Final    Eosinophils Relative 11/14/2018 2  0 - 6 % Final    Basophils Relative 11/14/2018 1  0 - 1 % Final    Neutrophils Absolute 11/14/2018 6 50  1 85 - 7 62 Thousands/µL Final    Immature Grans Absolute 11/14/2018 0 03  0 00 - 0 20 Thousand/uL Final    Lymphocytes Absolute 11/14/2018 3 09  0 60 - 4 47 Thousands/µL Final    Monocytes Absolute 11/14/2018 0 61  0 17 - 1 22 Thousand/µL Final    Eosinophils Absolute 11/14/2018 0 22  0 00 - 0 61 Thousand/µL Final    Basophils Absolute 11/14/2018 0 10  0 00 - 0 10 Thousands/µL Final    Iron Saturation 11/14/2018 14  % Final    TIBC 11/14/2018 348  250 - 450 ug/dL Final    Iron 11/14/2018 50  50 - 170 ug/dL Final      Patients treated with metal-binding drugs (ie  Deferoxamine) may have depressed iron values      Ferritin 11/14/2018 11  8 - 388 ng/mL Final   Admission on 09/15/2018, Discharged on 09/15/2018   Component Date Value Ref Range Status    EXT PREG TEST UR (Ref: Negative) 09/15/2018 negative   Final    Color, UA 09/15/2018 Straw  Straw, Yellow, Pale Yellow Final    Clarity, UA 09/15/2018 Clear  Clear, Other Final    Specific Gravity, UA 09/15/2018 1 010  1 003 - 1 040 Final    pH, UA 09/15/2018 6 0  4 5 - 8 0 Final    Leukocytes, UA 09/15/2018 Negative  Negative Final    Nitrite, UA 09/15/2018 Negative  Negative Final    Protein, UA 09/15/2018 Negative  Negative mg/dl Final    Glucose, UA 09/15/2018 Negative  Negative mg/dl Final    Ketones, UA 09/15/2018 Negative  Negative mg/dl Final    Bilirubin, UA 09/15/2018 Negative  Negative Final    Blood, UA 09/15/2018 250 0* Negative Final    UROBILINOGEN UA 09/15/2018 Negative  1 0, Negative mg/dL Final    WBC 09/15/2018 12 60* 4 50 - 11 00 Thousand/uL Final    RBC 09/15/2018 4 74  4 00 - 5 20 Million/uL Final    Hemoglobin 09/15/2018 14 1  12 0 - 16 0 g/dL Final    Hematocrit 09/15/2018 42 5  36 0 - 46 0 % Final    MCV 09/15/2018 90  80 - 100 fL Final    MCH 09/15/2018 29 7  26 0 - 34 0 pg Final    MCHC 09/15/2018 33 1  31 0 - 36 0 g/dL Final    RDW 09/15/2018 13 8  <15 3 % Final    MPV 09/15/2018 8 9  8 9 - 12 7 fL Final    Platelets 60/52/9661 367  150 - 450 Thousands/uL Final    Neutrophils Relative 09/15/2018 63  45 - 65 % Final    Lymphocytes Relative 09/15/2018 28  20 - 50 % Final    Monocytes Relative 09/15/2018 6  1 - 10 % Final    Eosinophils Relative 09/15/2018 3  0 - 6 % Final    Basophils Relative 09/15/2018 1  0 - 1 % Final    Neutrophils Absolute 09/15/2018 7 90* 1 80 - 7 80 Thousands/µL Final    Lymphocytes Absolute 09/15/2018 3 50  0 50 - 4 00 Thousands/µL Final    Monocytes Absolute 09/15/2018 0 80  0 20 - 0 90 Thousand/µL Final    Eosinophils Absolute 09/15/2018 0 40  0 00 - 0 40 Thousand/µL Final    Basophils Absolute 09/15/2018 0 10  0 00 - 0 10 Thousands/µL Final    Sodium 09/15/2018 142  137 - 147 mmol/L Final    Potassium 09/15/2018 3 8  3 6 - 5 0 mmol/L Final    Chloride 09/15/2018 105  97 - 108 mmol/L Final    CO2 09/15/2018 27  22 - 30 mmol/L Final    ANION GAP 09/15/2018 10  5 - 14 mmol/L Final    BUN 09/15/2018 9  5 - 25 mg/dL Final    Creatinine 09/15/2018 0 81  0 60 - 1 20 mg/dL Final    Standardized to IDMS reference method    Glucose 09/15/2018 103* 70 - 99 mg/dL Final      If the patient is fasting, the ADA then defines impaired fasting glucose as > 100 mg/dL and diabetes as > or equal to 123 mg/dL  Specimen collection should occur prior to Sulfasalazine administration due to the potential for falsely depressed results  Specimen collection should occur prior to Sulfapyridine administration due to the potential for falsely elevated results   Calcium 09/15/2018 10 4* 8 4 - 10 2 mg/dL Final    AST 09/15/2018 20  14 - 36 U/L Final      Specimen collection should occur prior to Sulfasalazine administration due to the potential for falsely depressed results   ALT 09/15/2018 22  9 - 52 U/L Final      Specimen collection should occur prior to Sulfasalazine administration due to the potential for falsely depressed results       Alkaline Phosphatase 09/15/2018 112  43 - 122 U/L Final    Total Protein 09/15/2018 8 7* 5 9 - 8 4 g/dL Final    Albumin 09/15/2018 4 4  3 0 - 5 2 g/dL Final    Total Bilirubin 09/15/2018 0 20  <1 30 mg/dL Final    eGFR 09/15/2018 84  >60 ml/min/1 73sq m Final    Lipase 09/15/2018 113  23 - 300 u/L Final    HCG, Quant 09/15/2018 <3  <=5 mIU/mL Final    N gonorrhoeae, DNA Probe 09/15/2018 N  gonorrhoeae Amplified DNA Negative  N  gonorrhoeae Amplified DNA Negative Final    Chlamydia, DNA Probe 09/15/2018 C  trachomatis Amplified DNA Negative  C  trachomatis Amplified DNA Negative Final    Candida Species 09/15/2018 Negative  Negative Final    Gardnerella vaginalis 09/15/2018 Negative  Negative Final    Trichomonas vaginalis 09/15/2018 Negative  Negative Final    RBC, UA 09/15/2018 Innumerable* None Seen, 0-5 /hpf Final    WBC, UA 09/15/2018 0-1* None Seen, 0-5, 5-55, 5-65 /hpf Final    Epithelial Cells 09/15/2018 Occasional  None Seen, Occasional /hpf Final    Bacteria, UA 09/15/2018 None Seen  None Seen, Occasional /hpf Final   Admission on 09/01/2018, Discharged on 09/01/2018   Component Date Value Ref Range Status    EXT PREG TEST UR (Ref: Negative) 09/01/2018 negative   Final    Color, UA 09/01/2018 Red* Straw, Yellow, Pale Yellow Final    Clarity, UA 09/01/2018 Cloudy* Clear, Other Final    Specific Gravity, UA 09/01/2018 1 010  1 003 - 1 040 Final    pH, UA 09/01/2018 8 0  4 5 - 8 0 Final    Leukocytes, UA 09/01/2018 Negative  Negative Final    Nitrite, UA 09/01/2018 Negative  Negative Final    Protein, UA 09/01/2018 >=500* Negative mg/dl Final    Glucose, UA 09/01/2018 Negative  Negative mg/dl Final    Ketones, UA 09/01/2018 Negative  Negative mg/dl Final    Bilirubin, UA 09/01/2018 Negative  Negative Final    Blood, UA 09/01/2018 250 0* Negative Final    UROBILINOGEN UA 09/01/2018 Negative  1 0, Negative mg/dL Final    WBC 09/01/2018 12 50* 4 50 - 11 00 Thousand/uL Final    RBC 09/01/2018 4 43  4 00 - 5 20 Million/uL Final    Hemoglobin 09/01/2018 13 0  12 0 - 16 0 g/dL Final    Hematocrit 09/01/2018 39 1  36 0 - 46 0 % Final    MCV 09/01/2018 88  80 - 100 fL Final    MCH 09/01/2018 29 3  26 0 - 34 0 pg Final    MCHC 09/01/2018 33 2  31 0 - 36 0 g/dL Final    RDW 09/01/2018 14 0  <15 3 % Final    MPV 09/01/2018 9 1  8 9 - 12 7 fL Final    Platelets 83/63/6062 277  150 - 450 Thousands/uL Final    Neutrophils Relative 09/01/2018 72* 45 - 65 % Final    Lymphocytes Relative 09/01/2018 21  20 - 50 % Final    Monocytes Relative 09/01/2018 6  1 - 10 % Final    Eosinophils Relative 09/01/2018 1  0 - 6 % Final    Basophils Relative 09/01/2018 1  0 - 1 % Final    Neutrophils Absolute 09/01/2018 8 90* 1 80 - 7 80 Thousands/µL Final    Lymphocytes Absolute 09/01/2018 2 60  0 50 - 4 00 Thousands/µL Final    Monocytes Absolute 09/01/2018 0 70  0 20 - 0 90 Thousand/µL Final    Eosinophils Absolute 09/01/2018 0 10  0 00 - 0 40 Thousand/µL Final    Basophils Absolute 09/01/2018 0 10  0 00 - 0 10 Thousands/µL Final    Sodium 09/01/2018 138  137 - 147 mmol/L Final    Potassium 09/01/2018 3 9  3 6 - 5 0 mmol/L Final    Chloride 09/01/2018 108  97 - 108 mmol/L Final    CO2 09/01/2018 24  22 - 30 mmol/L Final    ANION GAP 09/01/2018 6  5 - 14 mmol/L Final    BUN 09/01/2018 9  5 - 25 mg/dL Final    Creatinine 09/01/2018 0 58* 0 60 - 1 20 mg/dL Final    Standardized to IDMS reference method    Glucose 09/01/2018 98  70 - 99 mg/dL Final      If the patient is fasting, the ADA then defines impaired fasting glucose as > 100 mg/dL and diabetes as > or equal to 123 mg/dL  Specimen collection should occur prior to Sulfasalazine administration due to the potential for falsely depressed results  Specimen collection should occur prior to Sulfapyridine administration due to the potential for falsely elevated results   Calcium 09/01/2018 9 5  8 4 - 10 2 mg/dL Final    AST 09/01/2018 15  14 - 36 U/L Final      Specimen collection should occur prior to Sulfasalazine administration due to the potential for falsely depressed results   ALT 09/01/2018 25  9 - 52 U/L Final      Specimen collection should occur prior to Sulfasalazine administration due to the potential for falsely depressed results       Alkaline Phosphatase 09/01/2018 103  43 - 122 U/L Final    Total Protein 09/01/2018 7 4  5 9 - 8 4 g/dL Final    Albumin 09/01/2018 3 6  3 0 - 5 2 g/dL Final    Total Bilirubin 09/01/2018 0 20  <1 30 mg/dL Final    eGFR 09/01/2018 106  >60 ml/min/1 73sq m Final    Lipase 09/01/2018 69  23 - 300 u/L Final    N gonorrhoeae, DNA Probe 09/01/2018 N  gonorrhoeae Amplified DNA Negative  N  gonorrhoeae Amplified DNA Negative Final    Chlamydia, DNA Probe 09/01/2018 C  trachomatis Amplified DNA Negative  C  trachomatis Amplified DNA Negative Final    Candida Species 09/01/2018 Negative  Negative Final    Gardnerella vaginalis 09/01/2018 Negative  Negative Final    Trichomonas vaginalis 09/01/2018 Negative  Negative Final    RBC, UA 09/01/2018 Innumerable* None Seen, 0-5 /hpf Final    WBC, UA 09/01/2018 0-1* None Seen, 0-5, 5-55, 5-65 /hpf Final    Epithelial Cells 09/01/2018 Occasional  None Seen, Occasional /hpf Final    Bacteria, UA 09/01/2018 Occasional  None Seen, Occasional /hpf Final    RBC Morphology 09/01/2018 Normal   Final    Platelet Estimate 98/51/5496 Adequate  Adequate Final     Appointment on 11/14/2018   Component Date Value Ref Range Status    WBC 11/14/2018 10 55* 4 31 - 10 16 Thousand/uL Final    RBC 11/14/2018 4 55  3 81 - 5 12 Million/uL Final    Hemoglobin 11/14/2018 12 9  11 5 - 15 4 g/dL Final    Hematocrit 11/14/2018 41 6  34 8 - 46 1 % Final    MCV 11/14/2018 91  82 - 98 fL Final    MCH 11/14/2018 28 4  26 8 - 34 3 pg Final    MCHC 11/14/2018 31 0* 31 4 - 37 4 g/dL Final    RDW 11/14/2018 13 2  11 6 - 15 1 % Final    MPV 11/14/2018 11 2  8 9 - 12 7 fL Final    Platelets 25/37/7176 317  149 - 390 Thousands/uL Final    nRBC 11/14/2018 0  /100 WBCs Final    Neutrophils Relative 11/14/2018 62  43 - 75 % Final    Immat GRANS % 11/14/2018 0  0 - 2 % Final    Lymphocytes Relative 11/14/2018 29  14 - 44 % Final    Monocytes Relative 11/14/2018 6  4 - 12 % Final    Eosinophils Relative 11/14/2018 2  0 - 6 % Final    Basophils Relative 11/14/2018 1  0 - 1 % Final    Neutrophils Absolute 11/14/2018 6 50  1 85 - 7 62 Thousands/µL Final    Immature Grans Absolute 11/14/2018 0 03  0 00 - 0 20 Thousand/uL Final    Lymphocytes Absolute 11/14/2018 3 09  0 60 - 4 47 Thousands/µL Final    Monocytes Absolute 11/14/2018 0 61  0 17 - 1 22 Thousand/µL Final    Eosinophils Absolute 11/14/2018 0 22  0 00 - 0 61 Thousand/µL Final    Basophils Absolute 11/14/2018 0 10  0 00 - 0 10 Thousands/µL Final    Iron Saturation 11/14/2018 14  % Final    TIBC 11/14/2018 348  250 - 450 ug/dL Final    Iron 11/14/2018 50  50 - 170 ug/dL Final      Patients treated with metal-binding drugs (ie  Deferoxamine) may have depressed iron values   Ferritin 11/14/2018 11  8 - 388 ng/mL Final       Imaging:     Please note:   This report has been generated by a voice recognition software system  Therefore there may be syntax, spelling, and/or grammatical errors  Please call if you have any questions

## 2018-11-20 ENCOUNTER — DOCUMENTATION (OUTPATIENT)
Dept: HEMATOLOGY ONCOLOGY | Facility: CLINIC | Age: 53
End: 2018-11-20

## 2018-11-20 NOTE — PROGRESS NOTES
Currently the patients financial status is as follows: Ins card on file is NOT VALID  Patient has a large balance and accounts in collections  I have attempted to reach out to her on the number listed and no response  I have reached out on her daughters number and that is disconnected as well as the spouses number  I have just spoken with the patient  [place] [facility] [place]  She stated she has lost her insurance from her employer, however they are re-enrolling her in January  She is not sure what the insurance will be  Patient also stated she has started the PATHS process with a woman at [facility] who stated to her once they received the Utilities bill she should be qualify and it will be retro to cover all her outstanding bills  She will bring in all the information tomorrow when we meet  I will keep you all updated after out meeting  Patient requested that we please do not cancel her appointment

## 2018-11-21 ENCOUNTER — HOSPITAL ENCOUNTER (OUTPATIENT)
Dept: INFUSION CENTER | Facility: HOSPITAL | Age: 53
Discharge: HOME | End: 2018-11-21
Payer: COMMERCIAL

## 2018-11-21 VITALS
HEART RATE: 96 BPM | RESPIRATION RATE: 20 BRPM | DIASTOLIC BLOOD PRESSURE: 60 MMHG | TEMPERATURE: 96.9 F | SYSTOLIC BLOOD PRESSURE: 133 MMHG

## 2018-11-21 PROCEDURE — 96365 THER/PROPH/DIAG IV INF INIT: CPT

## 2018-11-21 RX ADMIN — FERUMOXYTOL 510 MG: 510 INJECTION INTRAVENOUS at 08:54

## 2018-11-21 NOTE — PLAN OF CARE
Problem: Potential for Falls  Goal: Patient will remain free of falls  INTERVENTIONS:  - Assess patient frequently for physical needs  -  Identify cognitive and physical deficits and behaviors that affect risk of falls    -  [place] fall precautions as indicated by assessment   - Educate patient/family on patient safety including physical limitations  - Instruct patient to call for assistance with activity based on assessment  - Modify environment to reduce risk of injury  - Consider OT/PT consult to assist with strengthening/mobility   Outcome: Progressing

## 2018-11-28 ENCOUNTER — DOCUMENTATION (OUTPATIENT)
Dept: HEMATOLOGY ONCOLOGY | Facility: CLINIC | Age: 53
End: 2018-11-28

## 2018-11-28 ENCOUNTER — HOSPITAL ENCOUNTER (OUTPATIENT)
Dept: INFUSION CENTER | Facility: HOSPITAL | Age: 53
Discharge: HOME | End: 2018-11-28
Payer: COMMERCIAL

## 2018-11-28 VITALS
RESPIRATION RATE: 18 BRPM | TEMPERATURE: 97.3 F | HEART RATE: 92 BPM | DIASTOLIC BLOOD PRESSURE: 70 MMHG | SYSTOLIC BLOOD PRESSURE: 124 MMHG

## 2018-11-28 PROCEDURE — 96365 THER/PROPH/DIAG IV INF INIT: CPT

## 2018-11-28 RX ADMIN — FERUMOXYTOL 510 MG: 510 INJECTION INTRAVENOUS at 08:28

## 2018-11-28 NOTE — PROGRESS NOTES
Met with the patient again today to obtain the supporting documents needed by [facility] office  Forwarded all documents to [facility] and they will be forwarding the information to the county  Also obtained a financial assistance application for the patient to help with the outstanding balance   This application was forwarded to O as well

## 2018-11-28 NOTE — PLAN OF CARE
Problem: Potential for Falls  Goal: Patient will remain free of falls  INTERVENTIONS:  - Assess patient frequently for physical needs  -  Identify cognitive and physical deficits and behaviors that affect risk of falls    -  [place] fall precautions as indicated by assessment   - Educate patient/family on patient safety including physical limitations  - Instruct patient to call for assistance with activity based on assessment  - Modify environment to reduce risk of injury  - Consider OT/PT consult to assist with strengthening/mobility   Outcome: Progressing

## 2018-11-28 NOTE — PROGRESS NOTES
Patient tolerated Faraheme infusion today without adverse event  AVS provided and patient left unit in stable condition

## 2018-12-10 ENCOUNTER — DOCUMENTATION (OUTPATIENT)
Dept: HEMATOLOGY ONCOLOGY | Facility: CLINIC | Age: 53
End: 2018-12-10

## 2018-12-10 NOTE — PROGRESS NOTES
Received additional financial documents from patient   Forwarded to [employer] as per her request for processing of financial assistance application

## 2018-12-19 ENCOUNTER — HOSPITAL ENCOUNTER (OUTPATIENT)
Dept: MAMMOGRAPHY | Facility: MEDICAL CENTER | Age: 53
Discharge: HOME | End: 2018-12-19
Payer: COMMERCIAL

## 2018-12-19 VITALS — BODY MASS INDEX: 28.27 KG/M2 | HEIGHT: 60 IN | WEIGHT: 144 LBS

## 2018-12-19 DIAGNOSIS — Z12.31 ENCOUNTER FOR SCREENING MAMMOGRAM FOR BREAST CANCER: ICD-10-CM

## 2018-12-19 PROCEDURE — 77067 SCR MAMMO BI INCL CAD: CPT

## 2018-12-19 PROCEDURE — 77063 BREAST TOMOSYNTHESIS BI: CPT

## 2018-12-27 ENCOUNTER — HOSPITAL ENCOUNTER (OUTPATIENT)
Dept: MAMMOGRAPHY | Facility: CLINIC | Age: 53
Discharge: HOME | End: 2018-12-27
Payer: COMMERCIAL

## 2018-12-27 ENCOUNTER — HOSPITAL ENCOUNTER (OUTPATIENT)
Dept: ULTRASOUND IMAGING | Facility: CLINIC | Age: 53
Discharge: HOME | End: 2018-12-27
Payer: COMMERCIAL

## 2018-12-27 VITALS — BODY MASS INDEX: 28.66 KG/M2 | WEIGHT: 146 LBS | HEIGHT: 60 IN

## 2018-12-27 DIAGNOSIS — R92.8 ABNORMAL MAMMOGRAM: ICD-10-CM

## 2018-12-27 PROCEDURE — G0279 TOMOSYNTHESIS, MAMMO: HCPCS

## 2018-12-27 PROCEDURE — 76642 ULTRASOUND BREAST LIMITED: CPT

## 2018-12-27 PROCEDURE — 77065 DX MAMMO INCL CAD UNI: CPT

## 2019-03-01 ENCOUNTER — TELEPHONE (OUTPATIENT)
Dept: HEMATOLOGY ONCOLOGY | Facility: CLINIC | Age: 54
End: 2019-03-01

## 2019-03-01 ENCOUNTER — APPOINTMENT (OUTPATIENT)
Dept: LAB | Facility: CLINIC | Age: 54
End: 2019-03-01
Payer: COMMERCIAL

## 2019-03-01 DIAGNOSIS — D50.0 IRON DEFICIENCY ANEMIA DUE TO CHRONIC BLOOD LOSS: ICD-10-CM

## 2019-03-01 LAB
BASOPHILS # BLD AUTO: 0.11 THOUSANDS/ΜL (ref 0–0.1)
BASOPHILS NFR BLD AUTO: 1 % (ref 0–1)
EOSINOPHIL # BLD AUTO: 0.23 THOUSAND/ΜL (ref 0–0.61)
EOSINOPHIL NFR BLD AUTO: 2 % (ref 0–6)
ERYTHROCYTE [DISTWIDTH] IN BLOOD BY AUTOMATED COUNT: 13.9 % (ref 11.6–15.1)
FERRITIN SERPL-MCNC: 67 NG/ML (ref 8–388)
HCT VFR BLD AUTO: 46.1 % (ref 34.8–46.1)
HGB BLD-MCNC: 14.6 G/DL (ref 11.5–15.4)
IMM GRANULOCYTES # BLD AUTO: 0.05 THOUSAND/UL (ref 0–0.2)
IMM GRANULOCYTES NFR BLD AUTO: 0 % (ref 0–2)
IRON SATN MFR SERPL: 32 %
IRON SERPL-MCNC: 99 UG/DL (ref 50–170)
LYMPHOCYTES # BLD AUTO: 3.33 THOUSANDS/ΜL (ref 0.6–4.47)
LYMPHOCYTES NFR BLD AUTO: 27 % (ref 14–44)
MCH RBC QN AUTO: 29.4 PG (ref 26.8–34.3)
MCHC RBC AUTO-ENTMCNC: 31.7 G/DL (ref 31.4–37.4)
MCV RBC AUTO: 93 FL (ref 82–98)
MONOCYTES # BLD AUTO: 0.76 THOUSAND/ΜL (ref 0.17–1.22)
MONOCYTES NFR BLD AUTO: 6 % (ref 4–12)
NEUTROPHILS # BLD AUTO: 7.87 THOUSANDS/ΜL (ref 1.85–7.62)
NEUTS SEG NFR BLD AUTO: 64 % (ref 43–75)
NRBC BLD AUTO-RTO: 0 /100 WBCS
PLATELET # BLD AUTO: 360 THOUSANDS/UL (ref 149–390)
PMV BLD AUTO: 11.5 FL (ref 8.9–12.7)
RBC # BLD AUTO: 4.96 MILLION/UL (ref 3.81–5.12)
TIBC SERPL-MCNC: 307 UG/DL (ref 250–450)
WBC # BLD AUTO: 12.35 THOUSAND/UL (ref 4.31–10.16)

## 2019-03-01 PROCEDURE — 85025 COMPLETE CBC W/AUTO DIFF WBC: CPT

## 2019-03-01 PROCEDURE — 36415 COLL VENOUS BLD VENIPUNCTURE: CPT

## 2019-03-01 PROCEDURE — 83550 IRON BINDING TEST: CPT

## 2019-03-01 PROCEDURE — 82728 ASSAY OF FERRITIN: CPT

## 2019-03-01 PROCEDURE — 83540 ASSAY OF IRON: CPT

## 2019-03-01 NOTE — TELEPHONE ENCOUNTER
Called and instructed patient to have her labs completed prior to her appt on 03/04/19  Patient stated she will have them done today

## 2019-03-04 ENCOUNTER — TELEPHONE (OUTPATIENT)
Dept: HEMATOLOGY ONCOLOGY | Facility: CLINIC | Age: 54
End: 2019-03-04

## 2019-03-04 ENCOUNTER — OFFICE VISIT (OUTPATIENT)
Dept: HEMATOLOGY ONCOLOGY | Facility: CLINIC | Age: 54
End: 2019-03-04
Payer: COMMERCIAL

## 2019-03-04 VITALS
TEMPERATURE: 98 F | WEIGHT: 149 LBS | HEART RATE: 86 BPM | SYSTOLIC BLOOD PRESSURE: 118 MMHG | HEIGHT: 60 IN | DIASTOLIC BLOOD PRESSURE: 72 MMHG | OXYGEN SATURATION: 98 % | RESPIRATION RATE: 17 BRPM | BODY MASS INDEX: 29.25 KG/M2

## 2019-03-04 DIAGNOSIS — D50.0 IRON DEFICIENCY ANEMIA DUE TO CHRONIC BLOOD LOSS: ICD-10-CM

## 2019-03-04 PROBLEM — R71.8 RBC MICROCYTOSIS: Status: RESOLVED | Noted: 2018-05-31 | Resolved: 2019-03-04

## 2019-03-04 PROCEDURE — 99214 OFFICE O/P EST MOD 30 MIN: CPT | Performed by: PHYSICIAN ASSISTANT

## 2019-03-04 RX ORDER — FERROUS SULFATE TAB EC 324 MG (65 MG FE EQUIVALENT) 324 (65 FE) MG
324 TABLET DELAYED RESPONSE ORAL
Qty: 60 TABLET | Refills: 5 | Status: SHIPPED | OUTPATIENT
Start: 2019-03-04 | End: 2019-11-21 | Stop reason: SDUPTHER

## 2019-03-04 NOTE — PROGRESS NOTES
[facility] [place]  [address] [place] [id number] [phone]  Hematology Ambulatory Follow-Up  Jeff Renner, 1965, [id number]  3/4/2019    Assessment/Plan:    1  Iron deficiency anemia due to chronic blood loss  This is a 60-year-old female with past medical history of iron deficiency anemia secondary to menorrhagia  Patient does have uterine fibroids  Patient was supposed to have an endometrial ablation however secondary to fibroids, GYN has recommended hysterectomy  Patient still has occasional bleeding however hemoglobin remains robust at 13 grams/deciliter with a normal MCV, and acceptable iron stores/iron saturation  Patient is meeting with gynecologist on March 8th  At that time and the patient will schedule surgery  At this time, I have recommended that the patient continue with oral iron as she continues to have some vaginal blood loss  Like mentioning above, patient is not a candidate for additional IV iron infusions  Patient will follow up in 3 months with blood work prior  At that time if hemoglobin is stable and bleeding has stopped, patient will likely be discharged from the practice  - ferrous sulfate 324 (65 Fe) mg; Take 1 tablet (324 mg total) by mouth 2 (two) times a day before meals  Dispense: 60 tablet; Refill: 5  - Iron Panel; Future  - CBC and differential; Future    The patient is scheduled for follow-up in approximately 3 months  Patient voiced agreement and understanding to the above  Patient knows to call the Hematology/Oncology office with any questions and concerns regarding the above  Carefully review your medication list in verify the list is accurate and up-to-date    Please call the hematologic/oncology office if there medications missing from the less, medications on the list that your not currently taking or if there is a dosage or instruction that is different from higher taking medication  Barrier(s) to care: None  The patient is able to self care   ------------------------------------------------------------------------------------------------------    Chief Complaint   Patient presents with    Follow-up     Iron Def Anemia     History of present illness: This is a 47year old female with long standing iron deficiency who has been taking oral iron supplements for several months/years prescribed by gynecologist secondary to heavy menstruation who presents to the Hematology office for evaluation of microcytic anemia      Patient notes that earlier this year she underwent an endometrial biopsy-2/22/18-negative for malignancy   Patient notes that she has been on a progesterone only pill, and has achieved later periods without clots  [address] evaluation was completed in May which demonstrated a hemoglobin decreased compared to that in February however RDW and MCV showed progressive signs of iron deficiency   Patient was referred to Hematology in May 2018 to discuss IV iron supplementation      Patient underwent IV Feraheme infusion x2 doses in May 2018   Patient tolerated doses without significant side effect   Patient has appreciated a significant response with iron saturation improved to 19% and ferritin at 89 ng/dL   Patient also followed up with gynecology who was able to start the patient on progesterone only supplements   Patient has noted a significant decrease in the amount of menstrual blood and perimenstural/ menstrual symptoms        In November, the patient noted to have continued vaginal bleeding  Patient was taking oral iron at that time however iron studies demonstrate significant decrease  Patient was symptomatic with fatigue  Patient underwent 2 additional infusions of IV Feraheme in November 2018  Now patient returns for follow-up  Interval history:  Patient notes that since her last appointment, she had to IV iron infusions  Patient states that she feels well    She does have some lower abdominal pain however this is related to cramping associated with vaginal bleeding  Patient had been recommended for colonoscopy evaluation however secondary to insurance coverage issues, patient has not undergone this procedure  Review of Systems   Constitutional: Negative for appetite change, fatigue, fever and unexpected weight change  HENT: Negative for nosebleeds  Respiratory: Negative for cough, choking and shortness of breath  Negative hemoptysis  Cardiovascular: Negative for chest pain, palpitations and leg swelling  Gastrointestinal: Negative  Negative for abdominal distention, abdominal pain, anal bleeding, blood in stool, constipation, diarrhea, nausea and vomiting  Endocrine: Negative  Negative for cold intolerance  Genitourinary: Negative  Negative for hematuria, menstrual problem, vaginal bleeding, vaginal discharge and vaginal pain  Musculoskeletal: Negative  Negative for arthralgias, myalgias, neck pain and neck stiffness  Skin: Negative  Negative for color change, pallor and rash  Allergic/Immunologic: Negative  Negative for immunocompromised state  Neurological: Negative  Negative for weakness and headaches  Hematological: Negative for adenopathy  Does not bruise/bleed easily  All other systems reviewed and are negative        Patient Active Problem List   Diagnosis    Iron deficiency anemia due to chronic blood loss    Menorrhagia with regular cycle    Uterine fibroid    History of positive PPD    Encounter for screening colonoscopy    Abnormal uterine bleeding    Anemia    Blurry vision    Intraductal hyperplasia without atypia of right breast    Thyroid enlargement     Past Medical History:   Diagnosis Date    Anemia     Disease of thyroid gland     enlarged thyroid    Intraductal hyperplasia without atypia of right breast 02/2016    c florid and sclerosing adenosis, background dense stromal fibrosis    Iron deficiency anemia due to chronic blood loss     transfused 2015 9/17: ferritin 2   H/H 8 8/31 7%    Menorrhagia with regular cycle     Varicella     without complication     Past Surgical History:   Procedure Laterality Date    BREAST BIOPSY Right 02/2016    intraductal hyperplasia without atypia,fibroid and sclerosing adenosis, and columnar cell change in a bsckround of dense stromal fibrosis no malignancy identified    TUBAL LIGATION      US GUIDED BREAST BIOPSY RIGHT COMPLETE Right 2/19/2016     Family History   Problem Relation Age of Onset   Roxi Courser Breast cancer Mother         52's passed 78 yo c mets    Anemia Mother     Thyroid disease Mother         total thyroidectomy uncertain reason    Hyperlipidemia Father     Deep vein thrombosis Neg Hx     Mental illness Neg Hx     Hypertension Neg Hx     Coronary artery disease Neg Hx     Diabetes type II Neg Hx      Social History     Socioeconomic History    Marital status: /Civil Union     Spouse name: Not on file    Number of children: Not on file    Years of education: Not on file    Highest education level: Not on file   Occupational History    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   Social Needs    Financial resource strain: Not on file    Food insecurity:     Worry: Not on file     Inability: Not on file    Transportation needs:     Medical: Not on file     Non-medical: Not on file   Tobacco Use    Smoking status: Never Smoker    Smokeless tobacco: Never Used   Substance and Sexual Activity    Alcohol use: No     Comment: rare    Drug use: No    Sexual activity: Not on file   Lifestyle    Physical activity:     Days per week: Not on file     Minutes per session: Not on file    Stress: Not on file   Relationships    Social connections:     Talks on phone: Not on file     Gets together: Not on file     Attends [employer] service: Not on file     Active member of club or organization: Not on file     Attends meetings of clubs or organizations: Not on file     Relationship status: Not on file    Intimate partner violence:     Fear of current or ex partner: Not on file     Emotionally abused: Not on file     Physically abused: Not on file     Forced sexual activity: Not on file   Other Topics Concern    Not on file   Social History Narrative    ** Merged History Encounter **    Inadequate exercise    Confucianist                Current Outpatient Medications:     albuterol (VENTOLIN HFA) 90 mcg/act inhaler, Inhale 2 puffs every 4 (four) hours as needed for wheezing (or chest tightness), Disp: 18 g, Rfl: 0    benzonatate (TESSALON PERLES) 100 mg capsule, Take 1-2 capsules (100-200 mg total) by mouth 3 (three) times a day as needed for cough, Disp: 30 capsule, Rfl: 0    ferrous sulfate 324 (65 Fe) mg, Take 1 tablet (324 mg total) by mouth 2 (two) times a day before meals, Disp: 60 tablet, Rfl: 5    fluticasone (FLONASE) 50 mcg/act nasal spray, 2 sprays into each nostril daily, Disp: 16 g, Rfl: 0    ibuprofen (MOTRIN) 600 mg tablet, Take 1 tablet (600 mg total) by mouth every 6 (six) hours as needed (pain), Disp: 30 tablet, Rfl: 0    norethindrone (JENCYCLA) 0 35 MG tablet, Take 1 tablet by mouth daily, Disp: , Rfl:     No Known Allergies    Objective:  /72 (BP Location: Left arm, Patient Position: Sitting)   Pulse 86   Temp 98 °F (36 7 °C) (Tympanic)   Resp 17   Ht 5' (1 524 m)   Wt 67 6 kg (149 lb)   LMP 12/21/2018 (Approximate)   SpO2 98%   BMI 29 10 kg/m²    Physical Exam   Constitutional: She is oriented to person, place, and time  She appears well-developed and well-nourished  No distress  HENT:   Head: Normocephalic and atraumatic  Eyes: Pupils are equal, round, and reactive to light  No scleral icterus  Cardiovascular: Normal rate and regular rhythm  No murmur heard  Pulmonary/Chest: Effort normal  No respiratory distress  Musculoskeletal: She exhibits no edema     Neurological: She is alert and oriented to person, place, and time  Skin: Skin is warm  No rash noted  No pallor  Psychiatric: She has a normal mood and affect  Thought content normal        Result Review  Labs:  Appointment on 03/01/2019   Component Date Value Ref Range Status    WBC 03/01/2019 12 35* 4 31 - 10 16 Thousand/uL Final    RBC 03/01/2019 4 96  3 81 - 5 12 Million/uL Final    Hemoglobin 03/01/2019 14 6  11 5 - 15 4 g/dL Final    Hematocrit 03/01/2019 46 1  34 8 - 46 1 % Final    MCV 03/01/2019 93  82 - 98 fL Final    MCH 03/01/2019 29 4  26 8 - 34 3 pg Final    MCHC 03/01/2019 31 7  31 4 - 37 4 g/dL Final    RDW 03/01/2019 13 9  11 6 - 15 1 % Final    MPV 03/01/2019 11 5  8 9 - 12 7 fL Final    Platelets 25/39/1995 360  149 - 390 Thousands/uL Final    nRBC 03/01/2019 0  /100 WBCs Final    Neutrophils Relative 03/01/2019 64  43 - 75 % Final    Immat GRANS % 03/01/2019 0  0 - 2 % Final    Lymphocytes Relative 03/01/2019 27  14 - 44 % Final    Monocytes Relative 03/01/2019 6  4 - 12 % Final    Eosinophils Relative 03/01/2019 2  0 - 6 % Final    Basophils Relative 03/01/2019 1  0 - 1 % Final    Neutrophils Absolute 03/01/2019 7 87* 1 85 - 7 62 Thousands/µL Final    Immature Grans Absolute 03/01/2019 0 05  0 00 - 0 20 Thousand/uL Final    Lymphocytes Absolute 03/01/2019 3 33  0 60 - 4 47 Thousands/µL Final    Monocytes Absolute 03/01/2019 0 76  0 17 - 1 22 Thousand/µL Final    Eosinophils Absolute 03/01/2019 0 23  0 00 - 0 61 Thousand/µL Final    Basophils Absolute 03/01/2019 0 11* 0 00 - 0 10 Thousands/µL Final    Iron Saturation 03/01/2019 32  % Final    TIBC 03/01/2019 307  250 - 450 ug/dL Final    Iron 03/01/2019 99  50 - 170 ug/dL Final      Patients treated with metal-binding drugs (ie  Deferoxamine) may have depressed iron values   Ferritin 03/01/2019 67  8 - 388 ng/mL Final         Please note: This report has been generated by a voice recognition software system   Therefore there may be syntax, spelling, and/or grammatical errors  Please call if you have any questions

## 2019-03-25 ENCOUNTER — APPOINTMENT (OUTPATIENT)
Dept: LAB | Facility: CLINIC | Age: 54
End: 2019-03-25
Payer: COMMERCIAL

## 2019-03-25 ENCOUNTER — TELEPHONE (OUTPATIENT)
Dept: FAMILY MEDICINE CLINIC | Facility: CLINIC | Age: 54
End: 2019-03-25

## 2019-03-25 DIAGNOSIS — Z92.89 HISTORY OF POSITIVE PPD: Primary | ICD-10-CM

## 2019-03-25 DIAGNOSIS — Z92.89 HISTORY OF POSITIVE PPD: ICD-10-CM

## 2019-03-25 PROCEDURE — 36415 COLL VENOUS BLD VENIPUNCTURE: CPT

## 2019-03-25 PROCEDURE — 86480 TB TEST CELL IMMUN MEASURE: CPT

## 2019-03-27 DIAGNOSIS — Z92.89 HISTORY OF POSITIVE PPD: Primary | ICD-10-CM

## 2019-03-27 LAB
GAMMA INTERFERON BACKGROUND BLD IA-ACNC: 0.24 IU/ML
M TB IFN-G BLD-IMP: POSITIVE
M TB IFN-G CD4+ BCKGRND COR BLD-ACNC: 0.88 IU/ML
M TB IFN-G CD4+ BCKGRND COR BLD-ACNC: 1.05 IU/ML
MITOGEN IGNF BCKGRD COR BLD-ACNC: >10 IU/ML

## 2019-03-28 ENCOUNTER — HOSPITAL ENCOUNTER (OUTPATIENT)
Dept: RADIOLOGY | Facility: HOSPITAL | Age: 54
Discharge: HOME | End: 2019-03-28
Payer: COMMERCIAL

## 2019-03-28 DIAGNOSIS — Z92.89 HISTORY OF POSITIVE PPD: ICD-10-CM

## 2019-03-28 PROCEDURE — 71046 X-RAY EXAM CHEST 2 VIEWS: CPT

## 2019-04-01 ENCOUNTER — TELEPHONE (OUTPATIENT)
Dept: FAMILY MEDICINE CLINIC | Facility: CLINIC | Age: 54
End: 2019-04-01

## 2019-04-05 ENCOUNTER — TELEPHONE (OUTPATIENT)
Dept: OTHER | Facility: OTHER | Age: 54
End: 2019-04-05

## 2019-05-08 ENCOUNTER — TELEPHONE (OUTPATIENT)
Dept: GASTROENTEROLOGY | Facility: AMBULARY SURGERY CENTER | Age: 54
End: 2019-05-08

## 2019-05-21 ENCOUNTER — TELEPHONE (OUTPATIENT)
Dept: GASTROENTEROLOGY | Facility: MEDICAL CENTER | Age: 54
End: 2019-05-21

## 2019-05-21 ENCOUNTER — PREP FOR PROCEDURE (OUTPATIENT)
Dept: GASTROENTEROLOGY | Facility: MEDICAL CENTER | Age: 54
End: 2019-05-21

## 2019-05-21 DIAGNOSIS — Z12.11 ENCOUNTER FOR SCREENING COLONOSCOPY: Primary | ICD-10-CM

## 2019-05-31 ENCOUNTER — TELEPHONE (OUTPATIENT)
Dept: HEMATOLOGY ONCOLOGY | Facility: CLINIC | Age: 54
End: 2019-05-31

## 2019-06-03 ENCOUNTER — HOSPITAL ENCOUNTER (EMERGENCY)
Facility: HOSPITAL | Age: 54
Discharge: HOME | End: 2019-06-03
Attending: EMERGENCY MEDICINE | Admitting: EMERGENCY MEDICINE
Payer: COMMERCIAL

## 2019-06-03 VITALS
TEMPERATURE: 98.3 F | WEIGHT: 149.3 LBS | RESPIRATION RATE: 16 BRPM | SYSTOLIC BLOOD PRESSURE: 107 MMHG | OXYGEN SATURATION: 99 % | HEART RATE: 82 BPM | DIASTOLIC BLOOD PRESSURE: 67 MMHG | BODY MASS INDEX: 29.16 KG/M2

## 2019-06-03 DIAGNOSIS — N39.0 UTI (URINARY TRACT INFECTION): ICD-10-CM

## 2019-06-03 DIAGNOSIS — N93.8 DYSFUNCTIONAL UTERINE BLEEDING: Primary | ICD-10-CM

## 2019-06-03 LAB
ALBUMIN SERPL BCP-MCNC: 3.9 G/DL (ref 3–5.2)
ALP SERPL-CCNC: 102 U/L (ref 43–122)
ALT SERPL W P-5'-P-CCNC: 10 U/L (ref 9–52)
AMORPH URATE CRY URNS QL MICRO: ABNORMAL /HPF
ANION GAP SERPL CALCULATED.3IONS-SCNC: 10 MMOL/L (ref 5–14)
AST SERPL W P-5'-P-CCNC: 21 U/L (ref 14–36)
BACTERIA UR QL AUTO: ABNORMAL /HPF
BASOPHILS # BLD AUTO: 0.1 THOUSANDS/ΜL (ref 0–0.1)
BASOPHILS NFR BLD AUTO: 1 % (ref 0–1)
BILIRUB SERPL-MCNC: 0.1 MG/DL
BILIRUB UR QL STRIP: NEGATIVE
BUN SERPL-MCNC: 12 MG/DL (ref 5–25)
CALCIUM SERPL-MCNC: 9.6 MG/DL (ref 8.4–10.2)
CHLORIDE SERPL-SCNC: 106 MMOL/L (ref 97–108)
CLARITY UR: ABNORMAL
CO2 SERPL-SCNC: 24 MMOL/L (ref 22–30)
COLOR UR: ABNORMAL
CREAT SERPL-MCNC: 0.92 MG/DL (ref 0.6–1.2)
EOSINOPHIL # BLD AUTO: 0.3 THOUSAND/ΜL (ref 0–0.4)
EOSINOPHIL NFR BLD AUTO: 3 % (ref 0–6)
ERYTHROCYTE [DISTWIDTH] IN BLOOD BY AUTOMATED COUNT: 13.3 %
EXT PREG TEST URINE: NEGATIVE
GFR SERPL CREATININE-BSD FRML MDRD: 71 ML/MIN/1.73SQ M
GLUCOSE SERPL-MCNC: 102 MG/DL (ref 70–99)
GLUCOSE UR STRIP-MCNC: NEGATIVE MG/DL
HCT VFR BLD AUTO: 36.2 % (ref 36–46)
HGB BLD-MCNC: 12 G/DL (ref 12–16)
HGB UR QL STRIP.AUTO: 250
KETONES UR STRIP-MCNC: NEGATIVE MG/DL
LEUKOCYTE ESTERASE UR QL STRIP: 25
LIPASE SERPL-CCNC: 139 U/L (ref 23–300)
LYMPHOCYTES # BLD AUTO: 3.2 THOUSANDS/ΜL (ref 0.5–4)
LYMPHOCYTES NFR BLD AUTO: 25 % (ref 25–45)
MCH RBC QN AUTO: 29 PG (ref 26–34)
MCHC RBC AUTO-ENTMCNC: 33 G/DL (ref 31–36)
MCV RBC AUTO: 88 FL (ref 80–100)
MONOCYTES # BLD AUTO: 0.8 THOUSAND/ΜL (ref 0.2–0.9)
MONOCYTES NFR BLD AUTO: 6 % (ref 1–10)
NEUTROPHILS # BLD AUTO: 8.1 THOUSANDS/ΜL (ref 1.8–7.8)
NEUTS SEG NFR BLD AUTO: 65 % (ref 45–65)
NITRITE UR QL STRIP: NEGATIVE
NON-SQ EPI CELLS URNS QL MICRO: ABNORMAL /HPF
PH UR STRIP.AUTO: 6 [PH]
PLATELET # BLD AUTO: 309 THOUSANDS/UL (ref 150–450)
PMV BLD AUTO: 9.9 FL (ref 8.9–12.7)
POTASSIUM SERPL-SCNC: 3.7 MMOL/L (ref 3.6–5)
PROT SERPL-MCNC: 7.3 G/DL (ref 5.9–8.4)
PROT UR STRIP-MCNC: ABNORMAL MG/DL
RBC # BLD AUTO: 4.13 MILLION/UL (ref 4–5.2)
RBC #/AREA URNS AUTO: ABNORMAL /HPF
SODIUM SERPL-SCNC: 140 MMOL/L (ref 137–147)
SP GR UR STRIP.AUTO: 1.01 (ref 1–1.04)
UROBILINOGEN UA: NEGATIVE MG/DL
WBC # BLD AUTO: 12.5 THOUSAND/UL (ref 4.5–11)
WBC #/AREA URNS AUTO: ABNORMAL /HPF

## 2019-06-03 PROCEDURE — 96361 HYDRATE IV INFUSION ADD-ON: CPT

## 2019-06-03 PROCEDURE — 85025 COMPLETE CBC W/AUTO DIFF WBC: CPT | Performed by: PHYSICIAN ASSISTANT

## 2019-06-03 PROCEDURE — 81003 URINALYSIS AUTO W/O SCOPE: CPT | Performed by: PHYSICIAN ASSISTANT

## 2019-06-03 PROCEDURE — 87591 N.GONORRHOEAE DNA AMP PROB: CPT | Performed by: PHYSICIAN ASSISTANT

## 2019-06-03 PROCEDURE — 81001 URINALYSIS AUTO W/SCOPE: CPT | Performed by: PHYSICIAN ASSISTANT

## 2019-06-03 PROCEDURE — 80053 COMPREHEN METABOLIC PANEL: CPT | Performed by: PHYSICIAN ASSISTANT

## 2019-06-03 PROCEDURE — 99283 EMERGENCY DEPT VISIT LOW MDM: CPT | Performed by: PHYSICIAN ASSISTANT

## 2019-06-03 PROCEDURE — 99284 EMERGENCY DEPT VISIT MOD MDM: CPT

## 2019-06-03 PROCEDURE — 96360 HYDRATION IV INFUSION INIT: CPT

## 2019-06-03 PROCEDURE — 83690 ASSAY OF LIPASE: CPT | Performed by: PHYSICIAN ASSISTANT

## 2019-06-03 PROCEDURE — 81025 URINE PREGNANCY TEST: CPT | Performed by: PHYSICIAN ASSISTANT

## 2019-06-03 PROCEDURE — 87491 CHLMYD TRACH DNA AMP PROBE: CPT | Performed by: PHYSICIAN ASSISTANT

## 2019-06-03 PROCEDURE — 36415 COLL VENOUS BLD VENIPUNCTURE: CPT | Performed by: PHYSICIAN ASSISTANT

## 2019-06-03 RX ORDER — CEPHALEXIN 500 MG/1
500 CAPSULE ORAL EVERY 8 HOURS SCHEDULED
Qty: 30 CAPSULE | Refills: 0 | Status: SHIPPED | OUTPATIENT
Start: 2019-06-03 | End: 2019-06-13

## 2019-06-03 RX ORDER — SODIUM CHLORIDE 9 MG/ML
250 INJECTION, SOLUTION INTRAVENOUS CONTINUOUS
Status: DISCONTINUED | OUTPATIENT
Start: 2019-06-03 | End: 2019-06-03 | Stop reason: HOSPADM

## 2019-06-03 RX ORDER — IBUPROFEN 600 MG/1
600 TABLET ORAL EVERY 6 HOURS PRN
Qty: 30 TABLET | Refills: 0 | Status: SHIPPED | OUTPATIENT
Start: 2019-06-03 | End: 2019-11-21 | Stop reason: SDUPTHER

## 2019-06-03 RX ADMIN — SODIUM CHLORIDE 250 ML/HR: 9 INJECTION, SOLUTION INTRAVENOUS at 15:16

## 2019-06-04 ENCOUNTER — ANESTHESIA EVENT (OUTPATIENT)
Dept: GASTROENTEROLOGY | Facility: MEDICAL CENTER | Age: 54
End: 2019-06-04

## 2019-06-04 LAB
C TRACH DNA SPEC QL NAA+PROBE: NEGATIVE
N GONORRHOEA DNA SPEC QL NAA+PROBE: NEGATIVE

## 2019-06-10 ENCOUNTER — ANESTHESIA (OUTPATIENT)
Dept: GASTROENTEROLOGY | Facility: MEDICAL CENTER | Age: 54
End: 2019-06-10

## 2019-06-13 ENCOUNTER — APPOINTMENT (OUTPATIENT)
Dept: LAB | Facility: CLINIC | Age: 54
End: 2019-06-13
Payer: COMMERCIAL

## 2019-06-13 DIAGNOSIS — D50.0 IRON DEFICIENCY ANEMIA DUE TO CHRONIC BLOOD LOSS: ICD-10-CM

## 2019-06-13 LAB
BASOPHILS # BLD AUTO: 0.11 THOUSANDS/ΜL (ref 0–0.1)
BASOPHILS NFR BLD AUTO: 1 % (ref 0–1)
EOSINOPHIL # BLD AUTO: 0.27 THOUSAND/ΜL (ref 0–0.61)
EOSINOPHIL NFR BLD AUTO: 2 % (ref 0–6)
ERYTHROCYTE [DISTWIDTH] IN BLOOD BY AUTOMATED COUNT: 12.8 % (ref 11.6–15.1)
FERRITIN SERPL-MCNC: 5 NG/ML (ref 8–388)
HCT VFR BLD AUTO: 37.7 % (ref 34.8–46.1)
HGB BLD-MCNC: 12 G/DL (ref 11.5–15.4)
IMM GRANULOCYTES # BLD AUTO: 0.05 THOUSAND/UL (ref 0–0.2)
IMM GRANULOCYTES NFR BLD AUTO: 0 % (ref 0–2)
IRON SATN MFR SERPL: 8 %
IRON SERPL-MCNC: 31 UG/DL (ref 50–170)
LYMPHOCYTES # BLD AUTO: 3.33 THOUSANDS/ΜL (ref 0.6–4.47)
LYMPHOCYTES NFR BLD AUTO: 27 % (ref 14–44)
MCH RBC QN AUTO: 28.5 PG (ref 26.8–34.3)
MCHC RBC AUTO-ENTMCNC: 31.8 G/DL (ref 31.4–37.4)
MCV RBC AUTO: 90 FL (ref 82–98)
MONOCYTES # BLD AUTO: 0.66 THOUSAND/ΜL (ref 0.17–1.22)
MONOCYTES NFR BLD AUTO: 5 % (ref 4–12)
NEUTROPHILS # BLD AUTO: 8.13 THOUSANDS/ΜL (ref 1.85–7.62)
NEUTS SEG NFR BLD AUTO: 65 % (ref 43–75)
NRBC BLD AUTO-RTO: 0 /100 WBCS
PLATELET # BLD AUTO: 416 THOUSANDS/UL (ref 149–390)
PMV BLD AUTO: 11.4 FL (ref 8.9–12.7)
RBC # BLD AUTO: 4.21 MILLION/UL (ref 3.81–5.12)
TIBC SERPL-MCNC: 375 UG/DL (ref 250–450)
WBC # BLD AUTO: 12.55 THOUSAND/UL (ref 4.31–10.16)

## 2019-06-13 PROCEDURE — 83540 ASSAY OF IRON: CPT

## 2019-06-13 PROCEDURE — 82728 ASSAY OF FERRITIN: CPT

## 2019-06-13 PROCEDURE — 85025 COMPLETE CBC W/AUTO DIFF WBC: CPT

## 2019-06-13 PROCEDURE — 36415 COLL VENOUS BLD VENIPUNCTURE: CPT

## 2019-06-13 PROCEDURE — 83550 IRON BINDING TEST: CPT

## 2019-06-17 ENCOUNTER — OFFICE VISIT (OUTPATIENT)
Dept: HEMATOLOGY ONCOLOGY | Facility: CLINIC | Age: 54
End: 2019-06-17
Payer: COMMERCIAL

## 2019-06-17 VITALS
OXYGEN SATURATION: 97 % | HEART RATE: 87 BPM | RESPIRATION RATE: 16 BRPM | TEMPERATURE: 97.8 F | SYSTOLIC BLOOD PRESSURE: 122 MMHG | DIASTOLIC BLOOD PRESSURE: 68 MMHG | WEIGHT: 146.2 LBS | BODY MASS INDEX: 28.7 KG/M2 | HEIGHT: 60 IN

## 2019-06-17 DIAGNOSIS — D50.0 IRON DEFICIENCY ANEMIA DUE TO CHRONIC BLOOD LOSS: Primary | ICD-10-CM

## 2019-06-17 DIAGNOSIS — Z71.89 COUNSELING AND COORDINATION OF CARE: ICD-10-CM

## 2019-06-17 DIAGNOSIS — N93.9 ABNORMAL UTERINE BLEEDING: ICD-10-CM

## 2019-06-17 PROCEDURE — 99215 OFFICE O/P EST HI 40 MIN: CPT | Performed by: PHYSICIAN ASSISTANT

## 2019-06-17 RX ORDER — MEDROXYPROGESTERONE ACETATE 10 MG/1
10 TABLET ORAL DAILY
COMMUNITY
Start: 2019-06-05 | End: 2019-07-05

## 2019-06-29 ENCOUNTER — HOSPITAL ENCOUNTER (EMERGENCY)
Facility: HOSPITAL | Age: 54
Discharge: HOME | End: 2019-06-29
Attending: EMERGENCY MEDICINE
Payer: COMMERCIAL

## 2019-06-29 VITALS
HEART RATE: 100 BPM | WEIGHT: 148.37 LBS | RESPIRATION RATE: 18 BRPM | DIASTOLIC BLOOD PRESSURE: 83 MMHG | OXYGEN SATURATION: 100 % | TEMPERATURE: 99.8 F | BODY MASS INDEX: 28.98 KG/M2 | SYSTOLIC BLOOD PRESSURE: 135 MMHG

## 2019-06-29 DIAGNOSIS — J30.9 ALLERGIC RHINITIS: Primary | ICD-10-CM

## 2019-06-29 PROCEDURE — 99282 EMERGENCY DEPT VISIT SF MDM: CPT | Performed by: PHYSICIAN ASSISTANT

## 2019-06-29 PROCEDURE — 99283 EMERGENCY DEPT VISIT LOW MDM: CPT

## 2019-06-29 RX ORDER — FLUTICASONE PROPIONATE 50 MCG
1 SPRAY, SUSPENSION (ML) NASAL DAILY
Qty: 16 G | Refills: 0 | Status: SHIPPED | OUTPATIENT
Start: 2019-06-29 | End: 2020-01-16 | Stop reason: SDUPTHER

## 2019-06-29 RX ORDER — CETIRIZINE HYDROCHLORIDE 10 MG/1
10 TABLET ORAL DAILY
Qty: 15 TABLET | Refills: 0 | Status: SHIPPED | OUTPATIENT
Start: 2019-06-29 | End: 2019-07-15 | Stop reason: SDUPTHER

## 2019-06-29 RX ORDER — DIPHENHYDRAMINE HCL 25 MG
25 TABLET ORAL ONCE
Status: COMPLETED | OUTPATIENT
Start: 2019-06-29 | End: 2019-06-29

## 2019-06-29 RX ADMIN — DIPHENHYDRAMINE HCL 25 MG: 25 TABLET ORAL at 19:43

## 2019-06-30 NOTE — ED PROVIDER NOTES
History  Chief Complaint   Patient presents with    Cold Like Symptoms     patient has nasal congestion and watery eyes since yesterday     48 y/o F presents today for nasal congestion and watery eyes since yesterday  No fever/chills  No cough  No n/v/d  History provided by:  Patient  CHRIS   Presenting symptoms: congestion, rhinorrhea and sore throat    Presenting symptoms: no ear pain    Severity:  Mild  Onset quality:  Sudden  Duration:  1 day  Timing:  Constant  Progression:  Worsening  Chronicity:  New  Relieved by:  Nothing  Worsened by:  Nothing  Ineffective treatments:  None tried      Prior to Admission Medications   Prescriptions Last Dose Informant Patient Reported? Taking? albuterol (VENTOLIN HFA) 90 mcg/act inhaler  Self No No   Sig: Inhale 2 puffs every 4 (four) hours as needed for wheezing (or chest tightness)   benzonatate (TESSALON PERLES) 100 mg capsule Not Taking at Unknown time Self No No   Sig: Take 1-2 capsules (100-200 mg total) by mouth 3 (three) times a day as needed for cough   Patient not taking: Reported on 6/29/2019   ferrous sulfate 324 (65 Fe) mg 6/29/2019 at Unknown time Self No Yes   Sig: Take 1 tablet (324 mg total) by mouth 2 (two) times a day before meals   ibuprofen (MOTRIN) 600 mg tablet  Self No No   Sig: Take 1 tablet (600 mg total) by mouth every 6 (six) hours as needed for mild pain   medroxyPROGESTERone (PROVERA) 10 mg tablet 6/29/2019 at Unknown time  Yes Yes   Sig: Take 10 mg by mouth daily   norethindrone (JENCYCLA) 0 35 MG tablet Not Taking at Unknown time Self Yes No   Sig: Take 1 tablet by mouth daily      Facility-Administered Medications: None       Past Medical History:   Diagnosis Date    Anemia     Disease of thyroid gland     enlarged thyroid    Intraductal hyperplasia without atypia of right breast 02/2016    c florid and sclerosing adenosis, background dense stromal fibrosis    Iron deficiency anemia due to chronic blood loss     transfused 2015  9/17: ferritin 2  H/H 8 8/31 7%    Menorrhagia with regular cycle     Varicella     without complication       Past Surgical History:   Procedure Laterality Date    BREAST BIOPSY Right 02/2016    intraductal hyperplasia without atypia,fibroid and sclerosing adenosis, and columnar cell change in a bsckround of dense stromal fibrosis no malignancy identified    TUBAL LIGATION      US GUIDED BREAST BIOPSY RIGHT COMPLETE Right 2/19/2016    UTERINE FIBROID SURGERY         Family History   Problem Relation Age of Onset   Nohemi Lise Breast cancer Mother         52's passed 78 yo c mets    Anemia Mother     Thyroid disease Mother         total thyroidectomy uncertain reason    Hyperlipidemia Father     Deep vein thrombosis Neg Hx     Mental illness Neg Hx     Hypertension Neg Hx     Coronary artery disease Neg Hx     Diabetes type II Neg Hx      I have reviewed and agree with the history as documented  Social History     Tobacco Use    Smoking status: Never Smoker    Smokeless tobacco: Never Used   Substance Use Topics    Alcohol use: No     Comment: rare    Drug use: No        Review of Systems   Constitutional: Negative  HENT: Positive for congestion, rhinorrhea and sore throat  Negative for ear discharge, ear pain, facial swelling, hearing loss, mouth sores and sinus pressure  Eyes: Negative  Respiratory: Negative  Cardiovascular: Negative  Gastrointestinal: Negative  Endocrine: Negative  Genitourinary: Negative  Musculoskeletal: Negative  Skin: Negative  Allergic/Immunologic: Negative  Neurological: Negative  Hematological: Negative  Psychiatric/Behavioral: Negative  Physical Exam  Physical Exam   Constitutional: She is oriented to person, place, and time  She appears well-developed and well-nourished  HENT:   Head: Normocephalic and atraumatic     Right Ear: External ear normal    Left Ear: External ear normal    Mouth/Throat: Oropharynx is clear and moist  No oropharyngeal exudate  +rhinorrhea   Cardiovascular: Normal rate, regular rhythm and normal heart sounds  Pulmonary/Chest: Effort normal and breath sounds normal    Neurological: She is alert and oriented to person, place, and time  Skin: Skin is warm  Capillary refill takes less than 2 seconds  Psychiatric: She has a normal mood and affect  Her behavior is normal  Judgment and thought content normal        Vital Signs  ED Triage Vitals [06/29/19 1907]   Temperature Pulse Respirations Blood Pressure SpO2   99 8 °F (37 7 °C) 100 18 135/83 100 %      Temp Source Heart Rate Source Patient Position - Orthostatic VS BP Location FiO2 (%)   Tympanic Monitor Sitting Left arm --      Pain Score       8           Vitals:    06/29/19 1907   BP: 135/83   Pulse: 100   Patient Position - Orthostatic VS: Sitting         Visual Acuity      ED Medications  Medications   diphenhydrAMINE (BENADRYL) tablet 25 mg (25 mg Oral Given 6/29/19 1943)       Diagnostic Studies  Results Reviewed     None                 No orders to display              Procedures  Procedures       ED Course                               MDM  Number of Diagnoses or Management Options  Allergic rhinitis:   Diagnosis management comments: D/c home with claritin and flonase  Disposition  Final diagnoses: Allergic rhinitis     Time reflects when diagnosis was documented in both MDM as applicable and the Disposition within this note     Time User Action Codes Description Comment    6/29/2019  7:35 PM Oniel Gomez [J30 9] Allergic rhinitis       ED Disposition     ED Disposition Condition Date/Time Comment    Discharge Stable Sat Jun 29, 2019  7:35 PM Roberto Mancilla discharge to home/self care              Follow-up Information    None         Discharge Medication List as of 6/29/2019  7:36 PM      START taking these medications    Details   cetirizine (ZyrTEC) 10 mg tablet Take 1 tablet (10 mg total) by mouth daily, Starting Sat 6/29/2019, Print      fluticasone (FLONASE) 50 mcg/act nasal spray 1 spray into each nostril daily, Starting Sat 6/29/2019, Print         CONTINUE these medications which have NOT CHANGED    Details   ferrous sulfate 324 (65 Fe) mg Take 1 tablet (324 mg total) by mouth 2 (two) times a day before meals, Starting Mon 3/4/2019, Normal      medroxyPROGESTERone (PROVERA) 10 mg tablet Take 10 mg by mouth daily, Starting Wed 6/5/2019, Until Fri 7/5/2019, Historical Med      albuterol (VENTOLIN HFA) 90 mcg/act inhaler Inhale 2 puffs every 4 (four) hours as needed for wheezing (or chest tightness), Starting Thu 10/18/2018, Normal      benzonatate (TESSALON PERLES) 100 mg capsule Take 1-2 capsules (100-200 mg total) by mouth 3 (three) times a day as needed for cough, Starting Thu 10/18/2018, Normal      ibuprofen (MOTRIN) 600 mg tablet Take 1 tablet (600 mg total) by mouth every 6 (six) hours as needed for mild pain, Starting Mon 6/3/2019, Print      norethindrone (JENCYCLA) 0 35 MG tablet Take 1 tablet by mouth daily, Historical Med           No discharge procedures on file      ED Provider  Electronically Signed by           Sudhir Lackey PA-C  06/29/19 [id number]

## 2019-07-15 ENCOUNTER — TELEPHONE (OUTPATIENT)
Dept: HEMATOLOGY ONCOLOGY | Facility: CLINIC | Age: 54
End: 2019-07-15

## 2019-07-15 DIAGNOSIS — J30.9 ALLERGIC RHINITIS: ICD-10-CM

## 2019-07-15 RX ORDER — SODIUM CHLORIDE 9 MG/ML
20 INJECTION, SOLUTION INTRAVENOUS ONCE
Status: CANCELLED | OUTPATIENT
Start: 2019-07-17

## 2019-07-15 NOTE — TELEPHONE ENCOUNTER
Information was reviewed with the PA  The patient was instructed to get the infusion on 7/1/7/2019 at 10:00Am  The patient will only be getting one infusion due to insurance  I left a voice message for the patient to call back if there is any questions

## 2019-07-15 NOTE — TELEPHONE ENCOUNTER
Pt called to requesting a refill for her Zyrtec, Last Prescribed at [facility] for allergic rhinitis

## 2019-07-15 NOTE — TELEPHONE ENCOUNTER
Patient called stating that she don't feel well  She stated that over the weekend she believes she over worked her self and will possibly need a infusion   Best call back [phone]

## 2019-07-16 RX ORDER — CETIRIZINE HYDROCHLORIDE 10 MG/1
10 TABLET ORAL DAILY
Qty: 30 TABLET | Refills: 11 | Status: SHIPPED | OUTPATIENT
Start: 2019-07-16 | End: 2020-01-16 | Stop reason: SDUPTHER

## 2019-07-17 ENCOUNTER — HOSPITAL ENCOUNTER (OUTPATIENT)
Dept: INFUSION CENTER | Facility: HOSPITAL | Age: 54
Discharge: HOME | End: 2019-07-17
Attending: INTERNAL MEDICINE
Payer: COMMERCIAL

## 2019-07-17 VITALS
RESPIRATION RATE: 16 BRPM | DIASTOLIC BLOOD PRESSURE: 70 MMHG | HEART RATE: 80 BPM | TEMPERATURE: 98.7 F | SYSTOLIC BLOOD PRESSURE: 118 MMHG

## 2019-07-17 DIAGNOSIS — D50.0 IRON DEFICIENCY ANEMIA DUE TO CHRONIC BLOOD LOSS: Primary | ICD-10-CM

## 2019-07-17 LAB
BASOPHILS # BLD AUTO: 0.1 THOUSANDS/ΜL (ref 0–0.1)
BASOPHILS NFR BLD AUTO: 1 % (ref 0–1)
EOSINOPHIL # BLD AUTO: 0.27 THOUSAND/ΜL (ref 0–0.61)
EOSINOPHIL NFR BLD AUTO: 3 % (ref 0–6)
ERYTHROCYTE [DISTWIDTH] IN BLOOD BY AUTOMATED COUNT: 13.8 % (ref 11.6–15.1)
HCT VFR BLD AUTO: 37.7 % (ref 34.8–46.1)
HGB BLD-MCNC: 11.8 G/DL (ref 11.5–15.4)
IMM GRANULOCYTES # BLD AUTO: 0.03 THOUSAND/UL (ref 0–0.2)
IMM GRANULOCYTES NFR BLD AUTO: 0 % (ref 0–2)
LYMPHOCYTES # BLD AUTO: 2.42 THOUSANDS/ΜL (ref 0.6–4.47)
LYMPHOCYTES NFR BLD AUTO: 26 % (ref 14–44)
MCH RBC QN AUTO: 27.7 PG (ref 26.8–34.3)
MCHC RBC AUTO-ENTMCNC: 31.3 G/DL (ref 31.4–37.4)
MCV RBC AUTO: 89 FL (ref 82–98)
MONOCYTES # BLD AUTO: 0.62 THOUSAND/ΜL (ref 0.17–1.22)
MONOCYTES NFR BLD AUTO: 7 % (ref 4–12)
NEUTROPHILS # BLD AUTO: 5.88 THOUSANDS/ΜL (ref 1.85–7.62)
NEUTS SEG NFR BLD AUTO: 63 % (ref 43–75)
NRBC BLD AUTO-RTO: 0 /100 WBCS
PLATELET # BLD AUTO: 319 THOUSANDS/UL (ref 149–390)
PMV BLD AUTO: 10.9 FL (ref 8.9–12.7)
RBC # BLD AUTO: 4.26 MILLION/UL (ref 3.81–5.12)
WBC # BLD AUTO: 9.32 THOUSAND/UL (ref 4.31–10.16)

## 2019-07-17 PROCEDURE — 85025 COMPLETE CBC W/AUTO DIFF WBC: CPT | Performed by: INTERNAL MEDICINE

## 2019-07-17 PROCEDURE — 96365 THER/PROPH/DIAG IV INF INIT: CPT

## 2019-07-17 RX ORDER — SODIUM CHLORIDE 9 MG/ML
20 INJECTION, SOLUTION INTRAVENOUS ONCE
Status: COMPLETED | OUTPATIENT
Start: 2019-07-17 | End: 2019-07-17

## 2019-07-17 RX ORDER — SODIUM CHLORIDE 9 MG/ML
20 INJECTION, SOLUTION INTRAVENOUS ONCE
Status: CANCELLED | OUTPATIENT
Start: 2019-07-24

## 2019-07-17 RX ADMIN — FERUMOXYTOL 510 MG: 510 INJECTION INTRAVENOUS at 08:24

## 2019-07-17 RX ADMIN — SODIUM CHLORIDE 20 ML/HR: 0.9 INJECTION, SOLUTION INTRAVENOUS at 08:20

## 2019-07-17 NOTE — PROGRESS NOTES
Pt  Tolerated Fereheme without adverse event,  Pt  Explained her Insurance will term on 7/20/10  RN spoke with pharmacistAnatoliy who verbalized ok to given subsequent doses of Fereheme 3-8 days  Rn notified office of Dr Mickey Cardenas and spoke with Mary Gonzalez to given [place] on Saturday; orders to be changed in [place] after today completed  Future appointment scheduled for 7/20/19 in [facility] for 0930  Appointment ok'd by Jordan Salguero RN/Manager  AVS provided

## 2019-07-17 NOTE — PROGRESS NOTES
Pt  Verbalizes fatigue; states she has been to [facility] infusion in the past for iron  Fereheme ordered for infusion today

## 2019-07-20 ENCOUNTER — HOSPITAL ENCOUNTER (OUTPATIENT)
Dept: INFUSION CENTER | Facility: HOSPITAL | Age: 54
Discharge: HOME | End: 2019-07-20
Attending: INTERNAL MEDICINE
Payer: COMMERCIAL

## 2019-07-20 VITALS
DIASTOLIC BLOOD PRESSURE: 69 MMHG | RESPIRATION RATE: 16 BRPM | SYSTOLIC BLOOD PRESSURE: 116 MMHG | TEMPERATURE: 98 F | HEART RATE: 90 BPM

## 2019-07-20 DIAGNOSIS — D50.0 IRON DEFICIENCY ANEMIA DUE TO CHRONIC BLOOD LOSS: Primary | ICD-10-CM

## 2019-07-20 PROCEDURE — 96365 THER/PROPH/DIAG IV INF INIT: CPT

## 2019-07-20 RX ORDER — SODIUM CHLORIDE 9 MG/ML
20 INJECTION, SOLUTION INTRAVENOUS ONCE
Status: CANCELLED | OUTPATIENT
Start: 2019-07-24

## 2019-07-20 RX ORDER — SODIUM CHLORIDE 9 MG/ML
20 INJECTION, SOLUTION INTRAVENOUS ONCE
Status: COMPLETED | OUTPATIENT
Start: 2019-07-20 | End: 2019-07-20

## 2019-07-20 RX ADMIN — FERUMOXYTOL 510 MG: 510 INJECTION INTRAVENOUS at 10:15

## 2019-07-20 RX ADMIN — SODIUM CHLORIDE 20 ML/HR: 0.9 INJECTION, SOLUTION INTRAVENOUS at 10:10

## 2019-07-20 NOTE — PLAN OF CARE
Problem: Potential for Falls  Goal: Patient will remain free of falls  Description  INTERVENTIONS:  - Assess patient frequently for physical needs  -  Identify cognitive and physical deficits and behaviors that affect risk of falls    -  [place] fall precautions as indicated by assessment   - Educate patient/family on patient safety including physical limitations  - Instruct patient to call for assistance with activity based on assessment  - Modify environment to reduce risk of injury  - Consider OT/PT consult to assist with strengthening/mobility  Outcome: Progressing

## 2019-08-04 ENCOUNTER — HOSPITAL ENCOUNTER (EMERGENCY)
Facility: HOSPITAL | Age: 54
Discharge: HOME | End: 2019-08-04
Attending: EMERGENCY MEDICINE | Admitting: EMERGENCY MEDICINE

## 2019-08-04 VITALS
BODY MASS INDEX: 28.76 KG/M2 | WEIGHT: 147.27 LBS | RESPIRATION RATE: 17 BRPM | OXYGEN SATURATION: 98 % | DIASTOLIC BLOOD PRESSURE: 84 MMHG | TEMPERATURE: 98.8 F | SYSTOLIC BLOOD PRESSURE: 136 MMHG | HEART RATE: 89 BPM

## 2019-08-04 DIAGNOSIS — T24.201A PARTIAL THICKNESS BURN OF RIGHT LOWER EXTREMITY, INITIAL ENCOUNTER: Primary | ICD-10-CM

## 2019-08-04 PROCEDURE — 90715 TDAP VACCINE 7 YRS/> IM: CPT | Performed by: EMERGENCY MEDICINE

## 2019-08-04 PROCEDURE — 99283 EMERGENCY DEPT VISIT LOW MDM: CPT | Performed by: EMERGENCY MEDICINE

## 2019-08-04 PROCEDURE — 99283 EMERGENCY DEPT VISIT LOW MDM: CPT

## 2019-08-04 PROCEDURE — 90471 IMMUNIZATION ADMIN: CPT

## 2019-08-04 RX ORDER — GINSENG 100 MG
4 CAPSULE ORAL ONCE
Status: COMPLETED | OUTPATIENT
Start: 2019-08-04 | End: 2019-08-04

## 2019-08-04 RX ORDER — IBUPROFEN 600 MG/1
600 TABLET ORAL ONCE
Status: COMPLETED | OUTPATIENT
Start: 2019-08-04 | End: 2019-08-04

## 2019-08-04 RX ORDER — OXYCODONE HYDROCHLORIDE 5 MG/1
5 TABLET ORAL EVERY 4 HOURS PRN
Qty: 12 TABLET | Refills: 0 | Status: SHIPPED | OUTPATIENT
Start: 2019-08-04 | End: 2019-08-14

## 2019-08-04 RX ORDER — OXYCODONE HYDROCHLORIDE 5 MG/1
5 TABLET ORAL ONCE
Status: COMPLETED | OUTPATIENT
Start: 2019-08-04 | End: 2019-08-04

## 2019-08-04 RX ORDER — IBUPROFEN 600 MG/1
600 TABLET ORAL EVERY 6 HOURS PRN
Qty: 30 TABLET | Refills: 0 | Status: SHIPPED | OUTPATIENT
Start: 2019-08-04 | End: 2019-11-21

## 2019-08-04 RX ADMIN — TETANUS TOXOID, REDUCED DIPHTHERIA TOXOID AND ACELLULAR PERTUSSIS VACCINE, ADSORBED 0.5 ML: 5; 2.5; 8; 8; 2.5 SUSPENSION INTRAMUSCULAR at 16:55

## 2019-08-04 RX ADMIN — OXYCODONE HYDROCHLORIDE 5 MG: 5 TABLET ORAL at 16:27

## 2019-08-04 RX ADMIN — IBUPROFEN 600 MG: 600 TABLET ORAL at 16:28

## 2019-08-04 RX ADMIN — BACITRACIN 4 SMALL APPLICATION: 500 OINTMENT TOPICAL at 16:54

## 2019-08-04 NOTE — ED PROVIDER NOTES
History  Chief Complaint   Patient presents with    Burn     hot ramen noodles spilted onto right thigh about 1 5 hours ago, noted red and blistering to upper thigh     27-year-old female presents for evaluation of burn to right lateral thigh that occurred 2 hours ago  No pain medications prior to arrival   Patient states she spilled a hot bowl of noodles onto her bare leg  Denies any other injuries  Prior to Admission Medications   Prescriptions Last Dose Informant Patient Reported? Taking? albuterol (VENTOLIN HFA) 90 mcg/act inhaler  Self No No   Sig: Inhale 2 puffs every 4 (four) hours as needed for wheezing (or chest tightness)   benzonatate (TESSALON PERLES) 100 mg capsule  Self No No   Sig: Take 1-2 capsules (100-200 mg total) by mouth 3 (three) times a day as needed for cough   Patient not taking: Reported on 2019   cetirizine (ZyrTEC) 10 mg tablet   No No   Sig: Take 1 tablet (10 mg total) by mouth daily   ferrous sulfate 324 (65 Fe) mg  Self No No   Sig: Take 1 tablet (324 mg total) by mouth 2 (two) times a day before meals   fluticasone (FLONASE) 50 mcg/act nasal spray   No No   Si spray into each nostril daily   ibuprofen (MOTRIN) 600 mg tablet  Self No No   Sig: Take 1 tablet (600 mg total) by mouth every 6 (six) hours as needed for mild pain   norethindrone (JENCYCLA) 0 35 MG tablet  Self Yes No   Sig: Take 1 tablet by mouth daily      Facility-Administered Medications: None       Past Medical History:   Diagnosis Date    Anemia     Disease of thyroid gland     enlarged thyroid    Intraductal hyperplasia without atypia of right breast 2016    c florid and sclerosing adenosis, background dense stromal fibrosis    Iron deficiency anemia due to chronic blood loss     transfused : ferritin 2   H/H 8  7%    Menorrhagia with regular cycle     Varicella     without complication       Past Surgical History:   Procedure Laterality Date    BREAST BIOPSY Right 2016 intraductal hyperplasia without atypia,fibroid and sclerosing adenosis, and columnar cell change in a bsckround of dense stromal fibrosis no malignancy identified    TUBAL LIGATION      US GUIDED BREAST BIOPSY RIGHT COMPLETE Right 2/19/2016    UTERINE FIBROID SURGERY         Family History   Problem Relation Age of Onset   [facility] Breast cancer Mother         52's passed 76 yo c mets    Anemia Mother     Thyroid disease Mother         total thyroidectomy uncertain reason    Hyperlipidemia Father     Deep vein thrombosis Neg Hx     Mental illness Neg Hx     Hypertension Neg Hx     Coronary artery disease Neg Hx     Diabetes type II Neg Hx      I have reviewed and agree with the history as documented  Social History     Tobacco Use    Smoking status: Never Smoker    Smokeless tobacco: Never Used   Substance Use Topics    Alcohol use: No     Comment: rare    Drug use: No        Review of Systems   Skin: Positive for color change and wound  Right leg burn       Physical Exam  Physical Exam   Musculoskeletal: Normal range of motion  She exhibits tenderness  Neurological: She is alert  No sensory deficit  She exhibits normal muscle tone  Skin: Skin is warm  Capillary refill takes less than 2 seconds  Second-degree partial-thickness burn with blistering to right lateral thigh    Picture below             Vital Signs  ED Triage Vitals [08/04/19 1559]   Temperature Pulse Respirations Blood Pressure SpO2   98 8 °F (37 1 °C) (!) 109 18 145/81 97 %      Temp Source Heart Rate Source Patient Position - Orthostatic VS BP Location FiO2 (%)   Tympanic Monitor Lying Left arm --      Pain Score       Worst Possible Pain           Vitals:    08/04/19 1559 08/04/19 1730   BP: 145/81 136/84   Pulse: (!) 109 89   Patient Position - Orthostatic VS: Lying Lying         Visual Acuity      ED Medications  Medications   oxyCODONE (ROXICODONE) IR tablet 5 mg (5 mg Oral Given 8/4/19 1627)   ibuprofen (MOTRIN) tablet 600 mg (600 mg Oral Given 8/4/19 1628)   bacitracin topical ointment 4 small application (4 small application Topical Given 8/4/19 1654)   tetanus-diphtheria-acellular pertussis (BOOSTRIX) IM injection 0 5 mL (0 5 mL Intramuscular Given 8/4/19 1655)       Diagnostic Studies  Results Reviewed     None                 No orders to display              Procedures  Procedures       ED Course                               MDM  Number of Diagnoses or Management Options  Partial thickness burn of right lower extremity, initial encounter:   Diagnosis management comments: 13-year-old female presenting with deep partial-thickness burns  Pain control, local wound care with Xeroform and bacitracin  Follow up with [facility] burn center tomorrow morning  Return precautions provided  Update tetanus  Disposition  Final diagnoses:   Partial thickness burn of right lower extremity, initial encounter     Time reflects when diagnosis was documented in both MDM as applicable and the Disposition within this note     Time User Action Codes Description Comment    8/4/2019  5:18  [facility] [Y50 418H] Partial thickness burn of right lower extremity, initial encounter       ED Disposition     ED Disposition Condition Date/Time Comment    Discharge Stable Sun Aug 4, 2019  5:18 PM Harshil Cht discharge to home/self care              Follow-up Information     Follow up With Specialties Details Why [address]  In 1 day Call at 8:00AM for same day appointment Bryanna Gomes [address] [place] [address] Emergency Department Emergency Medicine  If symptoms worsen [address] [address] [phone]          Discharge Medication List as of 8/4/2019  5:19 PM      START taking these medications    Details   !! ibuprofen (MOTRIN) 600 mg tablet Take 1 tablet (600 mg total) by mouth every 6 (six) hours as needed for mild pain or moderate pain, Starting Sun 8/4/2019, Print      oxyCODONE (ROXICODONE) 5 mg immediate release tablet Take 1 tablet (5 mg total) by mouth every 4 (four) hours as needed for moderate pain for up to 10 daysMax Daily Amount: 30 mg, Starting Sun 8/4/2019, Until Wed 8/14/2019, Print       !! - Potential duplicate medications found  Please discuss with provider  CONTINUE these medications which have NOT CHANGED    Details   albuterol (VENTOLIN HFA) 90 mcg/act inhaler Inhale 2 puffs every 4 (four) hours as needed for wheezing (or chest tightness), Starting Thu 10/18/2018, Normal      benzonatate (TESSALON PERLES) 100 mg capsule Take 1-2 capsules (100-200 mg total) by mouth 3 (three) times a day as needed for cough, Starting Thu 10/18/2018, Normal      cetirizine (ZyrTEC) 10 mg tablet Take 1 tablet (10 mg total) by mouth daily, Starting Tue 7/16/2019, Normal      ferrous sulfate 324 (65 Fe) mg Take 1 tablet (324 mg total) by mouth 2 (two) times a day before meals, Starting Mon 3/4/2019, Normal      fluticasone (FLONASE) 50 mcg/act nasal spray 1 spray into each nostril daily, Starting Sat 6/29/2019, Print      !! ibuprofen (MOTRIN) 600 mg tablet Take 1 tablet (600 mg total) by mouth every 6 (six) hours as needed for mild pain, Starting Mon 6/3/2019, Print      norethindrone (JENCYCLA) 0 35 MG tablet Take 1 tablet by mouth daily, Historical Med       !! - Potential duplicate medications found  Please discuss with provider  No discharge procedures on file      ED Provider  Electronically Signed by           Marilyn Gooden DO  08/04/19 0006

## 2019-09-05 ENCOUNTER — TELEPHONE (OUTPATIENT)
Dept: HEMATOLOGY ONCOLOGY | Facility: CLINIC | Age: 54
End: 2019-09-05

## 2019-09-05 NOTE — TELEPHONE ENCOUNTER
Patient states she is feeling ok and will call back for an appointment when she acquires insurance from her employer

## 2019-09-08 PROBLEM — T31.0 BURN (ANY DEGREE) INVOLVING LESS THAN 10% OF BODY SURFACE: Status: ACTIVE | Noted: 2019-08-06

## 2019-09-08 PROBLEM — Z80.3 FAMILY HISTORY OF BREAST CANCER: Status: ACTIVE | Noted: 2019-05-23

## 2019-09-08 PROBLEM — T24.211A PARTIAL THICKNESS BURN OF RIGHT THIGH: Status: ACTIVE | Noted: 2019-08-06

## 2019-11-20 ENCOUNTER — TELEPHONE (OUTPATIENT)
Dept: HEMATOLOGY ONCOLOGY | Facility: CLINIC | Age: 54
End: 2019-11-20

## 2019-11-20 ENCOUNTER — APPOINTMENT (OUTPATIENT)
Dept: LAB | Facility: CLINIC | Age: 54
End: 2019-11-20
Payer: COMMERCIAL

## 2019-11-20 DIAGNOSIS — D50.0 IRON DEFICIENCY ANEMIA DUE TO CHRONIC BLOOD LOSS: ICD-10-CM

## 2019-11-20 DIAGNOSIS — Z92.89 HISTORY OF POSITIVE PPD: ICD-10-CM

## 2019-11-20 LAB
BASOPHILS # BLD AUTO: 0.11 THOUSANDS/ΜL (ref 0–0.1)
BASOPHILS NFR BLD AUTO: 1 % (ref 0–1)
EOSINOPHIL # BLD AUTO: 0.2 THOUSAND/ΜL (ref 0–0.61)
EOSINOPHIL NFR BLD AUTO: 1 % (ref 0–6)
ERYTHROCYTE [DISTWIDTH] IN BLOOD BY AUTOMATED COUNT: 13.2 % (ref 11.6–15.1)
FERRITIN SERPL-MCNC: 177 NG/ML (ref 8–388)
HCT VFR BLD AUTO: 44.2 % (ref 34.8–46.1)
HGB BLD-MCNC: 14.2 G/DL (ref 11.5–15.4)
IMM GRANULOCYTES # BLD AUTO: 0.08 THOUSAND/UL (ref 0–0.2)
IMM GRANULOCYTES NFR BLD AUTO: 1 % (ref 0–2)
IRON SATN MFR SERPL: 18 %
IRON SERPL-MCNC: 49 UG/DL (ref 50–170)
LYMPHOCYTES # BLD AUTO: 3.04 THOUSANDS/ΜL (ref 0.6–4.47)
LYMPHOCYTES NFR BLD AUTO: 20 % (ref 14–44)
MCH RBC QN AUTO: 29.7 PG (ref 26.8–34.3)
MCHC RBC AUTO-ENTMCNC: 32.1 G/DL (ref 31.4–37.4)
MCV RBC AUTO: 93 FL (ref 82–98)
MONOCYTES # BLD AUTO: 0.75 THOUSAND/ΜL (ref 0.17–1.22)
MONOCYTES NFR BLD AUTO: 5 % (ref 4–12)
NEUTROPHILS # BLD AUTO: 11.4 THOUSANDS/ΜL (ref 1.85–7.62)
NEUTS SEG NFR BLD AUTO: 72 % (ref 43–75)
NRBC BLD AUTO-RTO: 0 /100 WBCS
PLATELET # BLD AUTO: 313 THOUSANDS/UL (ref 149–390)
PMV BLD AUTO: 11.2 FL (ref 8.9–12.7)
RBC # BLD AUTO: 4.78 MILLION/UL (ref 3.81–5.12)
TIBC SERPL-MCNC: 265 UG/DL (ref 250–450)
WBC # BLD AUTO: 15.58 THOUSAND/UL (ref 4.31–10.16)

## 2019-11-20 PROCEDURE — 83550 IRON BINDING TEST: CPT

## 2019-11-20 PROCEDURE — 82728 ASSAY OF FERRITIN: CPT

## 2019-11-20 PROCEDURE — 85025 COMPLETE CBC W/AUTO DIFF WBC: CPT

## 2019-11-20 PROCEDURE — 83540 ASSAY OF IRON: CPT

## 2019-11-20 PROCEDURE — 36415 COLL VENOUS BLD VENIPUNCTURE: CPT

## 2019-11-20 PROCEDURE — 86480 TB TEST CELL IMMUN MEASURE: CPT

## 2019-11-20 NOTE — TELEPHONE ENCOUNTER
Patient called to say she did lab work, but lab told her the results may not be back before her apt  Patient will call in before she leaves for apt

## 2019-11-21 ENCOUNTER — OFFICE VISIT (OUTPATIENT)
Dept: HEMATOLOGY ONCOLOGY | Facility: CLINIC | Age: 54
End: 2019-11-21
Payer: COMMERCIAL

## 2019-11-21 VITALS
WEIGHT: 147 LBS | SYSTOLIC BLOOD PRESSURE: 118 MMHG | RESPIRATION RATE: 16 BRPM | HEART RATE: 92 BPM | TEMPERATURE: 96.8 F | BODY MASS INDEX: 28.86 KG/M2 | OXYGEN SATURATION: 97 % | DIASTOLIC BLOOD PRESSURE: 78 MMHG | HEIGHT: 60 IN

## 2019-11-21 DIAGNOSIS — N93.8 DYSFUNCTIONAL UTERINE BLEEDING: ICD-10-CM

## 2019-11-21 DIAGNOSIS — D50.0 IRON DEFICIENCY ANEMIA DUE TO CHRONIC BLOOD LOSS: Primary | ICD-10-CM

## 2019-11-21 LAB
GAMMA INTERFERON BACKGROUND BLD IA-ACNC: 0.13 IU/ML
M TB IFN-G BLD-IMP: POSITIVE
M TB IFN-G CD4+ BCKGRND COR BLD-ACNC: 0.41 IU/ML
M TB IFN-G CD4+ BCKGRND COR BLD-ACNC: 0.56 IU/ML
MITOGEN IGNF BCKGRD COR BLD-ACNC: >10 IU/ML

## 2019-11-21 PROCEDURE — 99214 OFFICE O/P EST MOD 30 MIN: CPT | Performed by: PHYSICIAN ASSISTANT

## 2019-11-21 RX ORDER — ACETAMINOPHEN 500 MG
1000 TABLET ORAL EVERY 8 HOURS PRN
COMMUNITY
Start: 2019-08-13 | End: 2022-04-20

## 2019-11-21 RX ORDER — IBUPROFEN 600 MG/1
600 TABLET ORAL EVERY 6 HOURS PRN
Qty: 30 TABLET | Refills: 0 | Status: SHIPPED | OUTPATIENT
Start: 2019-11-21 | End: 2020-01-16 | Stop reason: SDUPTHER

## 2019-11-21 RX ORDER — FERROUS SULFATE TAB EC 324 MG (65 MG FE EQUIVALENT) 324 (65 FE) MG
324 TABLET DELAYED RESPONSE ORAL
Qty: 60 TABLET | Refills: 5 | Status: SHIPPED | OUTPATIENT
Start: 2019-11-21 | End: 2020-01-16 | Stop reason: SDUPTHER

## 2019-11-21 NOTE — PROGRESS NOTES
Medical Oncology/Hematology Outpatient Follow Up Note  Harshil Sanderson, 49 y  o , 1965      Date:  11/21/19    Primary Hematology/Oncology Provider:  Dr Una Tavarez      Presenting Diagnosis/Chief Complaint:   Chief Complaint   Patient presents with    Follow-up     Follow up   Follow-up iron deficiency anemia    HPI/Hematologic History:   No history exists  History of iron deficiency anemia:  seen initially in consultation in 5/2018 for iron deficiency anemia due to heavy menses  February 2018 the patient underwent an endometrial biopsy which was negative for malignancy, however,a CBC in 5/2018 showed a continued decrease in hemoglobin with laboratory studies consistent with iron deficiency  She subsequently received 2 doses of IV Feraheme with an appropriate response  Since that time she has had better control of menses under the care of her gynecology provider with progesterone only supplements  Since that time, her iron levels and hemoglobin have been maintained with oral iron supplementation  She preferred to avoid IV iron supplementation and therefore she is monitored closely by Hematology on oral supplementation with planned for as needed parenteral iron  Current Oncologic/Hematologic Treatment:  Ferrous sulfate 324mg, 1 tablet twice daily in divided doses    Interval history:  11/21/19:   Today, patient reports she continues to feel overall very well  She does continue with some mild menstrual bleeding and is planning to follow up with her GYN provider this week for re-evaluation of fibroids which six months ago,  had several removed  She denies fever, chills , rash, anorexia, weight loss  No pulmonary or cardiac symptoms  No GI or urinary complaints  Remainder of a 12 point review of systems is negative    Review of Systems   All other systems reviewed and are negative       Allergies:   [unfilled]    Current Medications:     Current Outpatient Medications:     acetaminophen (TYLENOL) 500 mg tablet, Take 1,000 mg by mouth every 8 (eight) hours as needed, Disp: , Rfl:     albuterol (VENTOLIN HFA) 90 mcg/act inhaler, Inhale 2 puffs every 4 (four) hours as needed for wheezing (or chest tightness), Disp: 18 g, Rfl: 0    ferrous sulfate 324 (65 Fe) mg, Take 1 tablet (324 mg total) by mouth 2 (two) times a day before meals, Disp: 60 tablet, Rfl: 5    ibuprofen (MOTRIN) 600 mg tablet, Take 1 tablet (600 mg total) by mouth every 6 (six) hours as needed for mild pain, Disp: 30 tablet, Rfl: 0    benzonatate (TESSALON PERLES) 100 mg capsule, Take 1-2 capsules (100-200 mg total) by mouth 3 (three) times a day as needed for cough (Patient not taking: Reported on 6/29/2019), Disp: 30 capsule, Rfl: 0    cetirizine (ZyrTEC) 10 mg tablet, Take 1 tablet (10 mg total) by mouth daily (Patient not taking: Reported on 11/21/2019), Disp: 30 tablet, Rfl: 11    fluticasone (FLONASE) 50 mcg/act nasal spray, 1 spray into each nostril daily (Patient not taking: Reported on 11/21/2019), Disp: 16 g, Rfl: 0    norethindrone (JENCYCLA) 0 35 MG tablet, Take 1 tablet by mouth daily, Disp: , Rfl:       Vital Signs:  Vitals:    11/21/19 0900   BP: 118/78   Pulse: 92   Resp: 16   Temp: (!) 96 8 °F (36 °C)   SpO2: 97%       Physical Exam:  Physical Exam   Constitutional: She is oriented to person, place, and time  No distress  HENT:   Mouth/Throat: No oropharyngeal exudate  Eyes: No scleral icterus  Neck: Normal range of motion  Cardiovascular: Normal rate and regular rhythm  Pulmonary/Chest: Effort normal    Abdominal: There is no tenderness  Musculoskeletal: Normal range of motion  She exhibits no edema or tenderness  Neurological: She is alert and oriented to person, place, and time  Skin: Skin is warm  No rash noted  No pallor  Vitals reviewed  Imaging Studies:  No results found for this or any previous visit        Laboratory Studies:  Lab Results   Component Value Date    WBC 15 58 (H) 11/20/2019 HGB 14 2 11/20/2019    HCT 44 2 11/20/2019    MCV 93 11/20/2019     11/20/2019     Lab Results   Component Value Date    K 3 7 06/03/2019     06/03/2019    CO2 24 06/03/2019    BUN 12 06/03/2019    CREATININE 0 92 06/03/2019    GLUF 84 09/06/2017    CALCIUM 9 6 06/03/2019    AST 21 06/03/2019    ALT 10 06/03/2019    ALKPHOS 102 06/03/2019    EGFR 71 06/03/2019     Pertinent laboratory and imaging studies reviewed  ASSESSMENT/PLAN:  Gabriel Sanderson is a 49 y o   who presents for continued evaluation and management of     1  Iron deficiency anemia due to chronic blood loss  2  Dysfunctional uterine bleeding  Laboratory studies all improved from monitoring iron deficiency anemia since last evaluation in 6/2019 as follows:   Hemoglobin of 14/MCV 93 (12 0/MCV90)   Ferritin 177 (5)   Serum iron 49 (31)   Iron saturations 18% (8%)    She understands continue to follow with her GYN provider for menstrual blood loss since she is currently within normal limits for hemoglobin as well as iron studies  Recommend continue the current regimen with oral ferrous sulfate 324 mg twice daily in 2 divided doses  I have asked her to take 1 of her iron supplements at the same time as a vitamin-C supplement to increase  Absorption  We will avoid any parental iron if possible, she understands if cannot maintain her iron levels on oral iron we may need consider future IV repletion  Plan to see her back in 3 months time with Fay Butler with repeat CBC and iron studies to determine the course of her maintenance iron therapy  - ferrous sulfate 324 (65 Fe) mg; Take 1 tablet (324 mg total) by mouth 2 (two) times a day before meals  Dispense: 60 tablet; Refill: 5      Harshil Sanderson will return in 3 months time for continued management  Harshil Sanderson verbalized understanding of the plan and was in agreement and will contact us in the interim if there are questions or concerns      Barrier(s) to care identified  None  The patient is  able to self care  Please note: This report has been generated by a voice recognition software system  Therefore there may be syntax, spelling, and/or grammatical errors

## 2019-11-22 ENCOUNTER — TELEPHONE (OUTPATIENT)
Dept: FAMILY MEDICINE CLINIC | Facility: CLINIC | Age: 54
End: 2019-11-22

## 2019-11-22 DIAGNOSIS — Z92.89 HISTORY OF POSITIVE PPD: Primary | ICD-10-CM

## 2019-11-22 NOTE — TELEPHONE ENCOUNTER
----- Message from Suzanna Bhatti PA-C sent at 11/21/2019  6:11 PM EST -----  I do not recall ordering another QuantiFERON TB gold test   Please clarify with the patient if she is aware someone ordering this test for her  It was positive again  She had a normal chest x-ray after her previous positive result  Please see if this was ordered by someone for her job

## 2019-11-22 NOTE — TELEPHONE ENCOUNTER
Spoke with pt this morning, I found this order that was placed on 03/27/2019, Pt remember only doing her chest x-ray  Since Quantiferon TB gold order was pending and she went to get her other labs done yesterday they did it  Pt would to now what she should do, does she needs medication, or get another X-RAY, please advise            Orders Only  3/27/2019  [facility]   Geovany Michel, RN   Family Medicine   History of positive PPD   Dx    Orders Placed         Quantiferon TB Gold Plus       XR chest pa & lateral      All Encounter Results     Medication Changes        None      Medication List       Visit Diagnoses         History of positive PPD      Problem List    Additional Documentation     Vitals:    LMP 12/21/2018 (Approximate)

## 2019-11-22 NOTE — TELEPHONE ENCOUNTER
Notes recorded by Radha Owens RN on 11/21/2019 at 7:27 PM EST  Pt states she went to get blood work done yesterday and they said there was a tb test ordered for her and asked if she wanted it completed and she said ok   Pt states last time it was positive but negative when repeated for work  Gay Moser would like to know what she should do now?

## 2019-11-22 NOTE — TELEPHONE ENCOUNTER
Please ask her to follow-up with infectious disease for their opinion on having another positive result  When she had it done earlier this year, she repeated it and the repeat Quantiferon was negative  She also had a normal chest x-ray at that time

## 2019-12-02 ENCOUNTER — TELEPHONE (OUTPATIENT)
Dept: INFECTIOUS DISEASES | Facility: CLINIC | Age: 54
End: 2019-12-02

## 2019-12-02 NOTE — TELEPHONE ENCOUNTER
Pt called to schedule a new patient appt for latent tb  Let her know she will need a repeat chest x-ray done before we can schedule her  Pt said she will reach out to her pcp  She let me know that she actually was already treated for latent tb about 7 years ago and was on treatment for 10 months  Pt was not sure why they tested her again and why she would need treatment again  Per Dr Burk pt will most like will not need to be treated again if she was already given treatment  Pt will contact pcp to let them know this and if any questions pcp can give our office a call

## 2019-12-02 NOTE — TELEPHONE ENCOUNTER
Pt called today to let you know that she call infection diseases department to schedule an appointment, they told her that she needs to get a chest X-ray first and then she will be able to see them    Also pt is requesting a new script for Colonoscopy she did cancel previous referral since she was changing insurance

## 2019-12-03 DIAGNOSIS — Z92.89 HISTORY OF POSITIVE PPD: ICD-10-CM

## 2019-12-03 DIAGNOSIS — Z12.11 ENCOUNTER FOR SCREENING COLONOSCOPY: Primary | ICD-10-CM

## 2019-12-04 ENCOUNTER — TELEPHONE (OUTPATIENT)
Dept: FAMILY MEDICINE CLINIC | Facility: CLINIC | Age: 54
End: 2019-12-04

## 2019-12-04 ENCOUNTER — TELEPHONE (OUTPATIENT)
Dept: INFECTIOUS DISEASES | Facility: CLINIC | Age: 54
End: 2019-12-04

## 2019-12-04 NOTE — TELEPHONE ENCOUNTER
This patient was referred to our office for positive quant gold by Jia Strauss PA-C  Pt has had positive quant gold in the past and was treated by the [facility]  If there are questions that remain regarding positive quant gold, they should be addressed by [facility]  Spoke to Tyrel at PCP office to make her aware  Pt does not need an appt here in ID office

## 2019-12-04 NOTE — TELEPHONE ENCOUNTER
Infectious disease does not need to see her for positive Quantiferon gold as she was seen at [facility] in past and treated by them  If you have any questions, they ask you to check with the [facility]

## 2019-12-09 ENCOUNTER — TELEPHONE (OUTPATIENT)
Dept: GASTROENTEROLOGY | Facility: AMBULARY SURGERY CENTER | Age: 54
End: 2019-12-09

## 2019-12-09 ENCOUNTER — HOSPITAL ENCOUNTER (OUTPATIENT)
Dept: RADIOLOGY | Facility: HOSPITAL | Age: 54
Discharge: HOME | End: 2019-12-09
Payer: COMMERCIAL

## 2019-12-09 DIAGNOSIS — Z92.89 HISTORY OF POSITIVE PPD: ICD-10-CM

## 2019-12-09 PROCEDURE — 71046 X-RAY EXAM CHEST 2 VIEWS: CPT

## 2019-12-09 NOTE — TELEPHONE ENCOUNTER
Bar Tam - please let patient know that there is nothing else she needs to do since she was already treated - the Quantiferon can remain positive after treatment

## 2019-12-09 NOTE — TELEPHONE ENCOUNTER
Patients GI provider:  Dr Geo Synder    Number to return call: ([phone]    Reason for call: Pt is calling to r/s her colonoscopy  Scheduled procedure/appointment date if applicable: Apt/procedure   n/a

## 2019-12-11 ENCOUNTER — PREP FOR PROCEDURE (OUTPATIENT)
Dept: GASTROENTEROLOGY | Facility: MEDICAL CENTER | Age: 54
End: 2019-12-11

## 2019-12-11 DIAGNOSIS — Z12.11 ENCOUNTER FOR SCREENING COLONOSCOPY: Primary | ICD-10-CM

## 2019-12-11 NOTE — TELEPHONE ENCOUNTER
Pt is scheduled with dr Lalo Rose on 1/10/20 for colon, pt has golytely from canceled colon  I mailed out new instructions to pt home  Patient is aware she will need a  to and from   She will get a call the day before with an exact time for arrival

## 2020-01-02 ENCOUNTER — ANESTHESIA EVENT (OUTPATIENT)
Dept: GASTROENTEROLOGY | Facility: MEDICAL CENTER | Age: 55
End: 2020-01-02

## 2020-01-07 NOTE — TELEPHONE ENCOUNTER
Pt called to reschedule procedure with dr Braswell Public moved pt to 2/5/20 at [facility]

## 2020-01-10 ENCOUNTER — ANESTHESIA (OUTPATIENT)
Dept: GASTROENTEROLOGY | Facility: MEDICAL CENTER | Age: 55
End: 2020-01-10

## 2020-01-13 NOTE — PROGRESS NOTES
ADULT ANNUAL PHYSICAL  [facility] [facility]    NAME: Harshil Sanderson  AGE: 48 y o  SEX: female  : 1965     DATE: 2020     Assessment and Plan:     Problem List Items Addressed This Visit        Endocrine    Thyroid enlargement     Patient had normal thyroid function studies a little over a year ago  She never went for her thyroid ultrasound  New order was provided today  Relevant Orders    US thyroid       Respiratory    Allergic rhinitis     Intermittent  She was given refill on cetirizine 10 mg to use daily as needed as well as Flonase to use 2 sprays daily as needed  Will continue to monitor  Relevant Medications    ibuprofen (MOTRIN) 600 mg tablet    cetirizine (ZyrTEC) 10 mg tablet    fluticasone (FLONASE) 50 mcg/act nasal spray       Other    Iron deficiency anemia due to chronic blood loss     Patient has lab slip to recheck  She will continue with the iron twice daily  She has needed infusions in the past but her last labs showed a normal hemoglobin of 14 2 a few months ago  She will continue to follow with Hematology  Relevant Medications    ferrous sulfate 324 (65 Fe) mg    Blurry vision     Referral provided for ophthalmology for further evaluation  Relevant Orders    Ambulatory referral to Ophthalmology    Overweight (BMI 25 0-29  9)     Patient was encouraged to increase her intake of fruits and vegetables  She was also encouraged to exercise on a regular basis  Reassess at next visit           Relevant Orders    Comprehensive metabolic panel    Lipid Panel with Direct LDL reflex      Other Visit Diagnoses     Annual physical exam    -  Primary    Dysfunctional uterine bleeding        Relevant Medications    ibuprofen (MOTRIN) 600 mg tablet    Encounter for screening mammogram for breast cancer        Relevant Orders    Mammo screening bilateral w 3d & cad    Encounter for immunization Relevant Orders    influenza vaccine, 2608-9698, quadrivalent, recombinant, PF, 0 5 mL, for patients 18 yr+ (FLUBLOK) (Completed)    Viral upper respiratory tract infection        Relevant Medications    benzonatate (TESSALON PERLES) 100 mg capsule    albuterol (VENTOLIN HFA) 90 mcg/act inhaler    Iron deficiency anemia, unspecified iron deficiency anemia type        Relevant Medications    ferrous sulfate 324 (65 Fe) mg    Skin lesion of left ear        Relevant Orders    Ambulatory referral to Dermatology    Diabetes mellitus screening        Relevant Orders    Comprehensive metabolic panel    Lipid screening        Relevant Orders    Lipid Panel with Direct LDL reflex          Immunizations and preventive care screenings were discussed with patient today  Appropriate education was printed on patient's after visit summary  Counseling:  · Exercise: the importance of regular exercise/physical activity was discussed  Recommend exercise 3-5 times per week for at least 30 minutes  BMI Counseling: Body mass index is 27 66 kg/m²  The BMI is above normal  Nutrition recommendations include encouraging healthy choices of fruits and vegetables  Exercise recommendations include moderate physical activity 150 minutes/week  Return in about 1 year (around 1/16/2021) for Annual physical      Chief Complaint:     No chief complaint on file  History of Present Illness:     Adult Annual Physical   Patient here for a comprehensive physical exam  The patient reports problems - as below      Patient has history of thyroid enlargement noted on exam - ultrasound was never done    Had trouble with GEOVANNA - last hemoglobin was 14 2 about 2 months ago - is taking iron twice daily - is not also on OCP for menorrhagia with regular cycles - notes that she had iron infusions in the past - most recent was last year    She notes that she has a growth on the left ear - it is getting larger - present for months - no bleeding - painful only if moves it     Notes trouble with focusing vision to read the last few months    Diet and Physical Activity  · Diet/Nutrition: well balanced diet and limited fruits/vegetables  · Exercise: no formal exercise  Depression Screening  PHQ-9 Depression Screening    PHQ-9:    Frequency of the following problems over the past two weeks:       Little interest or pleasure in doing things:  0 - not at all  Feeling down, depressed, or hopeless:  0 - not at all  PHQ-2 Score:  0       General Health  · Sleep: sleeps well and gets 7-8 hours of sleep on average  · Hearing: normal - bilateral   · Vision: vision problems: blurry as above  · Dental: no dental visits for >1 year  /GYN Health  · Patient is: perimenopausal  · Sees GYN     Review of Systems:     Review of Systems   Constitutional: Negative for chills and fever  HENT: Negative for rhinorrhea and sore throat  Eyes: Negative for visual disturbance  Respiratory: Negative for cough, shortness of breath and wheezing  Cardiovascular: Negative for chest pain, palpitations and leg swelling  Gastrointestinal: Negative for abdominal pain, constipation, diarrhea, nausea and vomiting  Endocrine: Negative for polydipsia and polyuria  Genitourinary: Negative for dysuria and frequency  Musculoskeletal: Negative for arthralgias and myalgias  Skin: Negative for rash  Lesion on left ear as in HPI   Neurological: Negative for dizziness and headaches  Hematological: Does not bruise/bleed easily  Psychiatric/Behavioral: Negative for dysphoric mood  The patient is not nervous/anxious  Past Medical History:     Past Medical History:   Diagnosis Date    Anemia     Disease of thyroid gland     enlarged thyroid    Intraductal hyperplasia without atypia of right breast 02/2016    c florid and sclerosing adenosis, background dense stromal fibrosis    Iron deficiency anemia due to chronic blood loss     transfused 2015 9/17: ferritin 2   H/H 8 8/31 7%    Menorrhagia with regular cycle     Varicella     without complication      Past Surgical History:     Past Surgical History:   Procedure Laterality Date    BREAST BIOPSY Right 02/2016    intraductal hyperplasia without atypia,fibroid and sclerosing adenosis, and columnar cell change in a bsckround of dense stromal fibrosis no malignancy identified    TUBAL LIGATION      US GUIDED BREAST BIOPSY RIGHT COMPLETE Right 2/19/2016    UTERINE FIBROID SURGERY        Social History:     Social History     Socioeconomic History    Marital status: /Civil Union     Spouse name: None    Number of children: None    Years of education: None    Highest education level: None   Occupational History    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   Social Needs    Financial resource strain: None    Food insecurity:     Worry: None     Inability: None    Transportation needs:     Medical: None     Non-medical: None   Tobacco Use    Smoking status: Never Smoker    Smokeless tobacco: Never Used   Substance and Sexual Activity    Alcohol use: No     Comment: rare    Drug use: No    Sexual activity: Yes     Partners: Male   Lifestyle    Physical activity:     Days per week: None     Minutes per session: None    Stress: None   Relationships    Social connections:     Talks on phone: None     Gets together: None     Attends Rastafarian service: None     Active member of club or organization: None     Attends meetings of clubs or organizations: None     Relationship status: None    Intimate partner violence:     Fear of current or ex partner: None     Emotionally abused: None     Physically abused: None     Forced sexual activity: None   Other Topics Concern    None   Social History Narrative    ** Merged History Encounter **    Inadequate exercise    Religion               Family History:     Family History   Problem Relation Age of Onset   Germán Gaston Breast cancer Mother         52's passed 76 yo c mets    Anemia Mother     Thyroid disease Mother         total thyroidectomy uncertain reason    Hyperlipidemia Father     No Known Problems Brother     ADD / ADHD Son     Deep vein thrombosis Neg Hx     Mental illness Neg Hx     Hypertension Neg Hx     Coronary artery disease Neg Hx     Diabetes type II Neg Hx       Current Medications:     Current Outpatient Medications   Medication Sig Dispense Refill    acetaminophen (TYLENOL) 500 mg tablet Take 1,000 mg by mouth every 8 (eight) hours as needed      albuterol (VENTOLIN HFA) 90 mcg/act inhaler Inhale 2 puffs every 4 (four) hours as needed for wheezing (or chest tightness) 18 g 0    cetirizine (ZyrTEC) 10 mg tablet Take 1 tablet (10 mg total) by mouth daily as needed for allergies 30 tablet 5    ferrous sulfate 324 (65 Fe) mg Take 1 tablet (324 mg total) by mouth 2 (two) times a day before meals 60 tablet 5    fluticasone (FLONASE) 50 mcg/act nasal spray 1 spray into each nostril daily as needed for allergies 16 g 3    ibuprofen (MOTRIN) 600 mg tablet Take 1 tablet (600 mg total) by mouth every 6 (six) hours as needed for mild pain 30 tablet 0    benzonatate (TESSALON PERLES) 100 mg capsule Take 1-2 capsules (100-200 mg total) by mouth 3 (three) times a day as needed for cough 30 capsule 0     No current facility-administered medications for this visit  Allergies:     No Known Allergies   Physical Exam:     /78 (BP Location: Left arm, Patient Position: Sitting, Cuff Size: Standard)   Pulse 88   Temp 98 3 °F (36 8 °C) (Tympanic)   Ht 5' 0 25" (1 53 m)   Wt 64 8 kg (142 lb 12 8 oz)   SpO2 94%   BMI 27 66 kg/m²     Physical Exam   Constitutional: She appears well-developed and well-nourished  HENT:   Head: Normocephalic and atraumatic     Right Ear: Hearing, tympanic membrane, external ear and ear canal normal    Left Ear: Hearing, tympanic membrane, external ear and ear canal normal    Nose: Nose normal    Mouth/Throat: Oropharynx is clear and moist and mucous membranes are normal    Eyes: Pupils are equal, round, and reactive to light  Conjunctivae and lids are normal    Neck: Trachea normal and normal range of motion  Neck supple  Carotid bruit is not present  No thyroid mass and no thyromegaly present  Cardiovascular: Normal rate, regular rhythm, S1 normal, S2 normal, normal heart sounds and intact distal pulses  No murmur heard  Pulses:       Radial pulses are 2+ on the right side, and 2+ on the left side  Posterior tibial pulses are 2+ on the right side, and 2+ on the left side  Pulmonary/Chest: Effort normal and breath sounds normal  She has no decreased breath sounds  She has no wheezes  She has no rhonchi  She has no rales  Abdominal: Soft  Normal appearance and bowel sounds are normal  She exhibits no distension and no mass  There is no hepatosplenomegaly  There is no tenderness  No hernia  Musculoskeletal: Normal range of motion  She exhibits no edema  Lymphadenopathy:     She has no cervical adenopathy  Neurological: She is alert  She has normal strength  No sensory deficit (light touch sensation intact and equal in UE and LE bilaterally)  Skin: Skin is warm and dry  No rash noted  Psychiatric: She has a normal mood and affect  Her behavior is normal    Vitals reviewed        Shana Perry PA-C  [facility]

## 2020-01-16 ENCOUNTER — OFFICE VISIT (OUTPATIENT)
Dept: FAMILY MEDICINE CLINIC | Facility: CLINIC | Age: 55
End: 2020-01-16
Payer: COMMERCIAL

## 2020-01-16 VITALS
BODY MASS INDEX: 28.03 KG/M2 | OXYGEN SATURATION: 94 % | HEART RATE: 88 BPM | HEIGHT: 60 IN | SYSTOLIC BLOOD PRESSURE: 100 MMHG | WEIGHT: 142.8 LBS | DIASTOLIC BLOOD PRESSURE: 78 MMHG | TEMPERATURE: 98.3 F

## 2020-01-16 DIAGNOSIS — E04.9 THYROID ENLARGEMENT: ICD-10-CM

## 2020-01-16 DIAGNOSIS — J06.9 VIRAL UPPER RESPIRATORY TRACT INFECTION: ICD-10-CM

## 2020-01-16 DIAGNOSIS — D50.0 IRON DEFICIENCY ANEMIA DUE TO CHRONIC BLOOD LOSS: ICD-10-CM

## 2020-01-16 DIAGNOSIS — Z13.1 DIABETES MELLITUS SCREENING: ICD-10-CM

## 2020-01-16 DIAGNOSIS — J30.9 ALLERGIC RHINITIS: ICD-10-CM

## 2020-01-16 DIAGNOSIS — Z23 ENCOUNTER FOR IMMUNIZATION: ICD-10-CM

## 2020-01-16 DIAGNOSIS — N93.8 DYSFUNCTIONAL UTERINE BLEEDING: ICD-10-CM

## 2020-01-16 DIAGNOSIS — E66.3 OVERWEIGHT (BMI 25.0-29.9): ICD-10-CM

## 2020-01-16 DIAGNOSIS — H53.8 BLURRY VISION: ICD-10-CM

## 2020-01-16 DIAGNOSIS — Z12.31 ENCOUNTER FOR SCREENING MAMMOGRAM FOR BREAST CANCER: ICD-10-CM

## 2020-01-16 DIAGNOSIS — Z00.00 ANNUAL PHYSICAL EXAM: Primary | ICD-10-CM

## 2020-01-16 DIAGNOSIS — D50.9 IRON DEFICIENCY ANEMIA, UNSPECIFIED IRON DEFICIENCY ANEMIA TYPE: ICD-10-CM

## 2020-01-16 DIAGNOSIS — Z13.220 LIPID SCREENING: ICD-10-CM

## 2020-01-16 DIAGNOSIS — H61.92 SKIN LESION OF LEFT EAR: ICD-10-CM

## 2020-01-16 PROCEDURE — 90682 RIV4 VACC RECOMBINANT DNA IM: CPT | Performed by: PHYSICIAN ASSISTANT

## 2020-01-16 PROCEDURE — 90471 IMMUNIZATION ADMIN: CPT | Performed by: PHYSICIAN ASSISTANT

## 2020-01-16 PROCEDURE — 99396 PREV VISIT EST AGE 40-64: CPT | Performed by: PHYSICIAN ASSISTANT

## 2020-01-16 RX ORDER — IBUPROFEN 600 MG/1
600 TABLET ORAL EVERY 6 HOURS PRN
Qty: 30 TABLET | Refills: 0 | Status: SHIPPED | OUTPATIENT
Start: 2020-01-16 | End: 2020-04-20 | Stop reason: SDUPTHER

## 2020-01-16 RX ORDER — CETIRIZINE HYDROCHLORIDE 10 MG/1
10 TABLET ORAL DAILY PRN
Qty: 30 TABLET | Refills: 5 | Status: SHIPPED | OUTPATIENT
Start: 2020-01-16 | End: 2021-06-23 | Stop reason: ALTCHOICE

## 2020-01-16 RX ORDER — BENZONATATE 100 MG/1
100-200 CAPSULE ORAL 3 TIMES DAILY PRN
Qty: 30 CAPSULE | Refills: 0 | Status: SHIPPED | OUTPATIENT
Start: 2020-01-16 | End: 2020-09-29 | Stop reason: ALTCHOICE

## 2020-01-16 RX ORDER — ALBUTEROL SULFATE 90 UG/1
2 AEROSOL, METERED RESPIRATORY (INHALATION) EVERY 4 HOURS PRN
Qty: 18 G | Refills: 0 | Status: SHIPPED | OUTPATIENT
Start: 2020-01-16 | End: 2021-06-23 | Stop reason: ALTCHOICE

## 2020-01-16 RX ORDER — FLUTICASONE PROPIONATE 50 MCG
1 SPRAY, SUSPENSION (ML) NASAL DAILY PRN
Qty: 16 G | Refills: 3 | Status: SHIPPED | OUTPATIENT
Start: 2020-01-16 | End: 2021-06-23 | Stop reason: ALTCHOICE

## 2020-01-16 RX ORDER — FERROUS SULFATE TAB EC 324 MG (65 MG FE EQUIVALENT) 324 (65 FE) MG
324 TABLET DELAYED RESPONSE ORAL
Qty: 60 TABLET | Refills: 5 | Status: SHIPPED | OUTPATIENT
Start: 2020-01-16 | End: 2020-08-31 | Stop reason: SDUPTHER

## 2020-01-16 NOTE — ASSESSMENT & PLAN NOTE
Patient has lab slip to recheck  She will continue with the iron twice daily  She has needed infusions in the past but her last labs showed a normal hemoglobin of 14 2 a few months ago  She will continue to follow with Hematology

## 2020-01-16 NOTE — ASSESSMENT & PLAN NOTE
Intermittent  She was given refill on cetirizine 10 mg to use daily as needed as well as Flonase to use 2 sprays daily as needed  Will continue to monitor

## 2020-01-16 NOTE — PATIENT INSTRUCTIONS

## 2020-01-16 NOTE — LETTER
January 16, 2020     Patient: Bárbara Sanderson   YOB: 1965   Date of Visit: 1/16/2020       To Whom it May Concern:    Harshil Sanderson is under my professional care  She was seen in my office on 1/16/2020  She has previously tested positive for tuberculosis but was treated for this  She also recently had a normal chest x-ray (no evidence of active TB) done on 12/9/19  If you have any questions or concerns, please don't hesitate to call           Sincerely,          Hawa Montesinos PA-C        CC: No Recipients

## 2020-01-16 NOTE — ASSESSMENT & PLAN NOTE
Patient was encouraged to increase her intake of fruits and vegetables  She was also encouraged to exercise on a regular basis  Reassess at next visit

## 2020-01-16 NOTE — ASSESSMENT & PLAN NOTE
Patient had normal thyroid function studies a little over a year ago  She never went for her thyroid ultrasound  New order was provided today

## 2020-01-18 ENCOUNTER — HOSPITAL ENCOUNTER (OUTPATIENT)
Dept: MAMMOGRAPHY | Facility: CLINIC | Age: 55
Discharge: HOME | End: 2020-01-18
Payer: COMMERCIAL

## 2020-01-18 VITALS — WEIGHT: 142 LBS | BODY MASS INDEX: 27.88 KG/M2 | HEIGHT: 60 IN

## 2020-01-18 DIAGNOSIS — Z12.31 ENCOUNTER FOR SCREENING MAMMOGRAM FOR BREAST CANCER: ICD-10-CM

## 2020-01-18 PROCEDURE — 77067 SCR MAMMO BI INCL CAD: CPT

## 2020-01-18 PROCEDURE — 77063 BREAST TOMOSYNTHESIS BI: CPT

## 2020-01-20 ENCOUNTER — HOSPITAL ENCOUNTER (OUTPATIENT)
Dept: ULTRASOUND IMAGING | Facility: HOSPITAL | Age: 55
Discharge: HOME | End: 2020-01-20
Payer: COMMERCIAL

## 2020-01-20 DIAGNOSIS — E04.9 THYROID ENLARGEMENT: ICD-10-CM

## 2020-01-20 PROCEDURE — 76536 US EXAM OF HEAD AND NECK: CPT

## 2020-01-30 ENCOUNTER — APPOINTMENT (OUTPATIENT)
Dept: LAB | Facility: CLINIC | Age: 55
End: 2020-01-30
Payer: COMMERCIAL

## 2020-01-30 DIAGNOSIS — Z13.220 LIPID SCREENING: ICD-10-CM

## 2020-01-30 DIAGNOSIS — D50.0 IRON DEFICIENCY ANEMIA DUE TO CHRONIC BLOOD LOSS: ICD-10-CM

## 2020-01-30 DIAGNOSIS — E66.3 OVERWEIGHT (BMI 25.0-29.9): ICD-10-CM

## 2020-01-30 LAB
ALBUMIN SERPL BCP-MCNC: 3.6 G/DL (ref 3.5–5)
ALP SERPL-CCNC: 123 U/L (ref 46–116)
ALT SERPL W P-5'-P-CCNC: 25 U/L (ref 12–78)
ANION GAP SERPL CALCULATED.3IONS-SCNC: 6 MMOL/L (ref 4–13)
AST SERPL W P-5'-P-CCNC: 14 U/L (ref 5–45)
BASOPHILS # BLD AUTO: 0.1 THOUSANDS/ΜL (ref 0–0.1)
BASOPHILS NFR BLD AUTO: 1 % (ref 0–1)
BILIRUB SERPL-MCNC: 0.36 MG/DL (ref 0.2–1)
BUN SERPL-MCNC: 9 MG/DL (ref 5–25)
CALCIUM SERPL-MCNC: 10 MG/DL (ref 8.3–10.1)
CHLORIDE SERPL-SCNC: 109 MMOL/L (ref 100–108)
CHOLEST SERPL-MCNC: 204 MG/DL (ref 50–200)
CO2 SERPL-SCNC: 25 MMOL/L (ref 21–32)
CREAT SERPL-MCNC: 0.8 MG/DL (ref 0.6–1.3)
EOSINOPHIL # BLD AUTO: 0.2 THOUSAND/ΜL (ref 0–0.61)
EOSINOPHIL NFR BLD AUTO: 2 % (ref 0–6)
ERYTHROCYTE [DISTWIDTH] IN BLOOD BY AUTOMATED COUNT: 12.8 % (ref 11.6–15.1)
FERRITIN SERPL-MCNC: 175 NG/ML (ref 8–388)
GFR SERPL CREATININE-BSD FRML MDRD: 84 ML/MIN/1.73SQ M
GLUCOSE P FAST SERPL-MCNC: 92 MG/DL (ref 65–99)
HCT VFR BLD AUTO: 46.1 % (ref 34.8–46.1)
HDLC SERPL-MCNC: 43 MG/DL
HGB BLD-MCNC: 14.6 G/DL (ref 11.5–15.4)
IMM GRANULOCYTES # BLD AUTO: 0.04 THOUSAND/UL (ref 0–0.2)
IMM GRANULOCYTES NFR BLD AUTO: 0 % (ref 0–2)
IRON SATN MFR SERPL: 34 %
IRON SERPL-MCNC: 94 UG/DL (ref 50–170)
LDLC SERPL CALC-MCNC: 127 MG/DL (ref 0–100)
LYMPHOCYTES # BLD AUTO: 2.94 THOUSANDS/ΜL (ref 0.6–4.47)
LYMPHOCYTES NFR BLD AUTO: 27 % (ref 14–44)
MCH RBC QN AUTO: 29.1 PG (ref 26.8–34.3)
MCHC RBC AUTO-ENTMCNC: 31.7 G/DL (ref 31.4–37.4)
MCV RBC AUTO: 92 FL (ref 82–98)
MONOCYTES # BLD AUTO: 0.52 THOUSAND/ΜL (ref 0.17–1.22)
MONOCYTES NFR BLD AUTO: 5 % (ref 4–12)
NEUTROPHILS # BLD AUTO: 7.21 THOUSANDS/ΜL (ref 1.85–7.62)
NEUTS SEG NFR BLD AUTO: 65 % (ref 43–75)
NRBC BLD AUTO-RTO: 0 /100 WBCS
PLATELET # BLD AUTO: 289 THOUSANDS/UL (ref 149–390)
PMV BLD AUTO: 11.4 FL (ref 8.9–12.7)
POTASSIUM SERPL-SCNC: 3.6 MMOL/L (ref 3.5–5.3)
PROT SERPL-MCNC: 7.8 G/DL (ref 6.4–8.2)
RBC # BLD AUTO: 5.02 MILLION/UL (ref 3.81–5.12)
SODIUM SERPL-SCNC: 140 MMOL/L (ref 136–145)
TIBC SERPL-MCNC: 274 UG/DL (ref 250–450)
TRIGL SERPL-MCNC: 171 MG/DL
WBC # BLD AUTO: 11.01 THOUSAND/UL (ref 4.31–10.16)

## 2020-01-30 PROCEDURE — 36415 COLL VENOUS BLD VENIPUNCTURE: CPT | Performed by: PHYSICIAN ASSISTANT

## 2020-01-30 PROCEDURE — 82728 ASSAY OF FERRITIN: CPT

## 2020-01-30 PROCEDURE — 83540 ASSAY OF IRON: CPT

## 2020-01-30 PROCEDURE — 80053 COMPREHEN METABOLIC PANEL: CPT | Performed by: PHYSICIAN ASSISTANT

## 2020-01-30 PROCEDURE — 80061 LIPID PANEL: CPT

## 2020-01-30 PROCEDURE — 83550 IRON BINDING TEST: CPT

## 2020-01-30 PROCEDURE — 85025 COMPLETE CBC W/AUTO DIFF WBC: CPT

## 2020-02-05 ENCOUNTER — HOSPITAL ENCOUNTER (OUTPATIENT)
Dept: GASTROENTEROLOGY | Facility: MEDICAL CENTER | Age: 55
Setting detail: OUTPATIENT SURGERY
Discharge: HOME | End: 2020-02-05
Attending: INTERNAL MEDICINE | Admitting: INTERNAL MEDICINE
Payer: COMMERCIAL

## 2020-02-05 VITALS
DIASTOLIC BLOOD PRESSURE: 50 MMHG | HEIGHT: 60 IN | SYSTOLIC BLOOD PRESSURE: 96 MMHG | RESPIRATION RATE: 20 BRPM | WEIGHT: 142 LBS | HEART RATE: 71 BPM | BODY MASS INDEX: 27.88 KG/M2 | TEMPERATURE: 98.4 F | OXYGEN SATURATION: 98 %

## 2020-02-05 DIAGNOSIS — Z12.11 ENCOUNTER FOR SCREENING COLONOSCOPY: ICD-10-CM

## 2020-02-05 LAB
EXT PREGNANCY TEST URINE: NEGATIVE
EXT. CONTROL: NORMAL

## 2020-02-05 PROCEDURE — 81025 URINE PREGNANCY TEST: CPT | Performed by: ANESTHESIOLOGY

## 2020-02-05 PROCEDURE — G0121 COLON CA SCRN NOT HI RSK IND: HCPCS | Performed by: INTERNAL MEDICINE

## 2020-02-05 RX ORDER — ONDANSETRON 2 MG/ML
4 INJECTION INTRAMUSCULAR; INTRAVENOUS EVERY 6 HOURS PRN
Status: DISCONTINUED | OUTPATIENT
Start: 2020-02-05 | End: 2020-02-09 | Stop reason: HOSPADM

## 2020-02-05 RX ORDER — PROPOFOL 10 MG/ML
INJECTION, EMULSION INTRAVENOUS AS NEEDED
Status: DISCONTINUED | OUTPATIENT
Start: 2020-02-05 | End: 2020-02-05 | Stop reason: SURG

## 2020-02-05 RX ORDER — SODIUM CHLORIDE 9 MG/ML
125 INJECTION, SOLUTION INTRAVENOUS CONTINUOUS
Status: DISCONTINUED | OUTPATIENT
Start: 2020-02-05 | End: 2020-02-09 | Stop reason: HOSPADM

## 2020-02-05 RX ADMIN — SODIUM CHLORIDE 125 ML/HR: 0.9 INJECTION, SOLUTION INTRAVENOUS at 07:19

## 2020-02-05 RX ADMIN — PROPOFOL 100 MG: 10 INJECTION, EMULSION INTRAVENOUS at 07:26

## 2020-02-05 RX ADMIN — PROPOFOL 50 MG: 10 INJECTION, EMULSION INTRAVENOUS at 07:29

## 2020-02-05 RX ADMIN — PROPOFOL 50 MG: 10 INJECTION, EMULSION INTRAVENOUS at 07:36

## 2020-02-05 RX ADMIN — PROPOFOL 50 MG: 10 INJECTION, EMULSION INTRAVENOUS at 07:32

## 2020-02-05 NOTE — DISCHARGE INSTRUCTIONS
Colonoscopy   WHAT YOU NEED TO KNOW:   A colonoscopy is a procedure to examine the inside of your colon (intestine) with a scope  Polyps or tissue growths may have been removed during your colonoscopy  It is normal to feel bloated and to have some abdominal discomfort  You should be passing gas  If you have hemorrhoids or you had polyps removed, you may have a small amount of bleeding  DISCHARGE INSTRUCTIONS:   Seek care immediately if:   · You have a large amount of bright red blood in your bowel movements  · Your abdomen is hard and firm and you have severe pain  · You have sudden trouble breathing  Contact your healthcare provider if:   · You develop a rash or hives  · You have a fever within 24 hours of your procedure  · You have not had a bowel movement for 3 days after your procedure  · You have questions or concerns about your condition or care  Activity:   · Do not lift, strain, or run  for 3 days after your procedure  · Rest after your procedure  You have been given medicine to relax you  Do not  drive or make important decisions until the day after your procedure  Return to your normal activity as directed  · Relieve gas and discomfort from bloating  by lying on your right side with a heating pad on your abdomen  You may need to take short walks to help the gas move out  Eat small meals until bloating is relieved  If you had polyps removed: For 7 days after your procedure:  · Do not  take aspirin  · Do not  go on long car rides  Help prevent constipation:   · Eat a variety of healthy foods  Healthy foods include fruit, vegetables, whole-grain breads, low-fat dairy products, beans, lean meat, and fish  Ask if you need to be on a special diet  Your healthcare provider may recommend that you eat high-fiber foods such as cooked beans  Fiber helps you have regular bowel movements  · Drink liquids as directed    Adults should drink between 9 and 13 eight-ounce cups of liquid every day  Ask what amount is best for you  For most people, good liquids to drink are water, juice, and milk  · Exercise as directed  Talk to your healthcare provider about the best exercise plan for you  Exercise can help prevent constipation, decrease your blood pressure and improve your health  Follow up with your healthcare provider as directed:  Write down your questions so you remember to ask them during your visits  © 2017 [employer] Information is for End User's use only and may not be sold, redistributed or otherwise used for commercial purposes  All illustrations and images included in CareNotes® are the copyrighted property of [employer]  or [employer]  The above information is an  only  It is not intended as medical advice for individual conditions or treatments  Talk to your doctor, nurse or pharmacist before following any medical regimen to see if it is safe and effective for you

## 2020-02-05 NOTE — ANESTHESIA POSTPROCEDURE EVALUATION
Post-Op Assessment Note    CV Status:  Stable    Pain management: adequate     Mental Status:  Alert and awake   Hydration Status:  Euvolemic   PONV Controlled:  Controlled   Airway Patency:  Patent   Post Op Vitals Reviewed: Yes      Staff: Anesthesiologist           BP 96/50 (02/05/20 0757)    Temp      Pulse 71 (02/05/20 0757)   Resp 20 (02/05/20 0757)    SpO2 98 % (02/05/20 0757)

## 2020-02-05 NOTE — ANESTHESIA PREPROCEDURE EVALUATION
Review of Systems/Medical History          Cardiovascular  Negative cardio ROS    Pulmonary  Negative pulmonary ROS        GI/Hepatic  Negative GI/hepatic ROS               Endo/Other  History of thyroid disease (enlarged thyroid) ,      GYN       Hematology  Anemia (history of anemia, last Hb 14) ,     Musculoskeletal  Negative musculoskeletal ROS        Neurology  Negative neurology ROS      Psychology   Negative psychology ROS              Physical Exam    Airway    Mallampati score: I  TM Distance: >3 FB  Neck ROM: full     Dental   upper dentures,     Cardiovascular  Comment: Negative ROS, Cardiovascular exam normal    Pulmonary  Pulmonary exam normal     Other Findings        Anesthesia Plan  ASA Score- 2     Anesthesia Type- IV sedation with anesthesia with ASA Monitors  Additional Monitors:   Airway Plan:         Plan Factors-    Induction- intravenous  Postoperative Plan-     Informed Consent- Anesthetic plan and risks discussed with patient

## 2020-02-05 NOTE — H&P
History and Physical - [facility]  Maryellen Cht 48 y o  female MRN: [id number]                  HPI: Gilmar Mcdonnell is a 48y o  year old female who presents for colonoscopy for colon cancer screening  She had iron deficiency anemia a few years ago but this has resolved and was likely due to menorrhagia  REVIEW OF SYSTEMS: Per the HPI, and otherwise unremarkable  Historical Information   Past Medical History:   Diagnosis Date    Anemia     Disease of thyroid gland     enlarged thyroid    Intraductal hyperplasia without atypia of right breast 02/2016    c florid and sclerosing adenosis, background dense stromal fibrosis    Iron deficiency anemia due to chronic blood loss     transfused 2015 9/17: ferritin 2   H/H 8 8/31 7%    Menorrhagia with regular cycle     Varicella     without complication     Past Surgical History:   Procedure Laterality Date    BREAST BIOPSY Right 02/2016    intraductal hyperplasia without atypia,fibroid and sclerosing adenosis, and columnar cell change in a bsckround of dense stromal fibrosis no malignancy identified    TUBAL LIGATION      US GUIDED BREAST BIOPSY RIGHT COMPLETE Right 2/19/2016    UTERINE FIBROID SURGERY       Social History   Social History     Substance and Sexual Activity   Alcohol Use No    Comment: rare     Social History     Substance and Sexual Activity   Drug Use No     Social History     Tobacco Use   Smoking Status Never Smoker   Smokeless Tobacco Never Used     Family History   Problem Relation Age of Onset   Margaret Seller Breast cancer Mother         52's passed 76 yo c mets    Anemia Mother     Thyroid disease Mother         total thyroidectomy uncertain reason    Hyperlipidemia Father     No Known Problems Brother     ADD / ADHD Son     No Known Problems Daughter     No Known Problems Maternal Grandmother     No Known Problems Maternal Grandfather     No Known Problems Paternal Grandmother     No Known Problems Paternal Grandfather     No Known Problems Daughter     No Known Problems Paternal Aunt     No Known Problems Paternal Aunt     No Known Problems Paternal Aunt     No Known Problems Paternal Aunt     No Known Problems Paternal Aunt     Deep vein thrombosis Neg Hx     Mental illness Neg Hx     Hypertension Neg Hx     Coronary artery disease Neg Hx     Diabetes type II Neg Hx        Meds/Allergies       (Not in a hospital admission)    No Known Allergies    Objective     Ht 5' (1 524 m)   Wt 64 4 kg (142 lb)   LMP 02/02/2020 (Approximate)   BMI 27 73 kg/m²       PHYSICAL EXAM    Gen: NAD  CV: RRR  CHEST: Clear  ABD: soft, NT/ND  EXT: no edema      ASSESSMENT/PLAN:  This is a 48y o  year old female here for colonoscopy, and she is stable and optimized for her procedure

## 2020-02-12 ENCOUNTER — OFFICE VISIT (OUTPATIENT)
Dept: DERMATOLOGY | Facility: CLINIC | Age: 55
End: 2020-02-12
Payer: COMMERCIAL

## 2020-02-12 VITALS — TEMPERATURE: 98.4 F | BODY MASS INDEX: 27.75 KG/M2 | WEIGHT: 147 LBS | HEIGHT: 61 IN

## 2020-02-12 DIAGNOSIS — L82.1 DERMATOSIS PAPULOSA NIGRA: ICD-10-CM

## 2020-02-12 DIAGNOSIS — L81.4 LENTIGO: ICD-10-CM

## 2020-02-12 DIAGNOSIS — L85.3 XEROSIS CUTIS: ICD-10-CM

## 2020-02-12 DIAGNOSIS — B07.9 VERRUCA VULGARIS: Primary | ICD-10-CM

## 2020-02-12 DIAGNOSIS — D22.9 MULTIPLE BENIGN MELANOCYTIC NEVI: ICD-10-CM

## 2020-02-12 PROCEDURE — 99204 OFFICE O/P NEW MOD 45 MIN: CPT | Performed by: DERMATOLOGY

## 2020-02-12 NOTE — PATIENT INSTRUCTIONS
VERRUCA VULGARIS ("Common Warts")    Assessment and Plan:  Based on a thorough discussion of this condition and the management approach to it (including a comprehensive discussion of the known risks, side effects and potential benefits of treatment), the patient (family) agrees to implement the following specific plan:   Dicussed this is caused by a virus    Discussed removing surgically    Will schedule for removal after daughter wedding    Verruca Vulgaris  A verruca is a common growth of the skin caused by infection by human papilloma virus (HPV)  There are many strains of the virus that cause different types of warts on the body  The virus infects the most superficial layers of the skin, causing increased production of skin cells and thickening  Warts can be spread through direct contact with infected skin and may spread to other parts of the body if scratched or picked  A verruca is more commonly called a "wart " Warts are particularly common in school-aged children but can arise at any age  Patients who have a history of eczema are especially prone due to impaired skin barrier  Those taking immunosuppressive drugs or with HIV infections may experience prolonged symptoms despite treatment  Warts generally have a rough surface with a tiny black dot sometimes observed in the middle of each scaly spot  They can range in size from a small bump to large scaly growths  Common warts are often found on the backs of fingers or toes, around the nails, and on the knees  Plantar warts can grow inwardly on the soles of the feet causing pain  There are many possible ways to treat warts and sometimes several different treatments are needed to get the warts to go away completely  There is no single perfect treatment for warts, and successful treatment can take many months  In-office treatments usually require multiple visits, and include:  1) Cryotherapy   a cold spray with liquid nitrogen will destroy the infected cells but may lead to discomfort and blistering  It may also leave a permanent white lucero or scar  2) Electrosurgery (curettage and cautery) can be used for large resistant warts which involves shaving the growth down and burning the base  It is performed under local anesthesia and may leave a permanent scar    3) Candida (yeast) antigen injections  These are extracts of the common yeast (Candida) that cannot cause an infection  The medication is injected into/under the wart  It is thought to stimulate the immune system to recognize the wart virus and attack it  Multiple injections are often needed about one month apart  There are also several at-home wart treatments:    1) Soak the warts in warm water for 5 minutes every night followed by gentle filing with a nail file or pumice stone  2) Topical salicylic acid or similar compounds work by removing the dead surface skin cells  a  Apply the medicine directly to the wart, wait for it to dry completely, then cover with duct tape overnight   b  Repeat until the wart is gone, which can take 2-4 months  c  Do not use on the face or groin area   d  If the wart paint makes the skin sore, stop treatment until the discomfort has settled, then recommence as above   e  Take care to keep the chemical off normal skin  3) Podophyllin is a cytotoxic agent used in some products and must not be used in pregnancy or women considering pregnancy  4) Some prescription medications include   a  Topical retinoids (adapalene, tretinoin, tazarotene), 5-fluorouracil (Efudex) or imiquimod (Aldara) creams are sometimes used to treat flat warts or warts on the face and other sensitive anatomical areas  They are usually applied directly to the warts once a day for 2-4 months and can be irritating  These treatments should only be used as directed by your health care provider    b  Systemic treatment with oral cimetidine (Tagamet) may help boost the immune system against the wart virus in patients, some of the time  Initiation of cimetidine therapy should ONLY be done under the supervision of your health care provider, who can discuss possible side effects and drug-to-drug interactions of this specific treatment  2  MELANOCYTIC NEVI ("Moles")      Assessment and Plan:  Based on a thorough discussion of this condition and the management approach to it (including a comprehensive discussion of the known risks, side effects and potential benefits of treatment), the patient (family) agrees to implement the following specific plan:   Discussed sun protection, monitor for any changes     Melanocytic Nevi  Melanocytic nevi ("moles") are tan or brown, raised or flat areas of the skin which have an increased number of melanocytes  Melanocytes are the cells in our body which make pigment and account for skin color  Some moles are present at birth (I e , "congenital nevi"), while others come up later in life (i e , "acquired nevi")  The sun can stimulate the body to make more moles  Sunburns are not the only thing that triggers more moles  Chronic sun exposure can do it too  Clinically distinguishing a healthy mole from melanoma may be difficult, even for experienced dermatologists  The "ABCDE's" of moles have been suggested as a means of helping to alert a person to a suspicious mole and the possible increased risk of melanoma  The suggestions for raising alert are as follows:    Asymmetry: Healthy moles tend to be symmetric, while melanomas are often asymmetric  Asymmetry means if you draw a line through the mole, the two halves do not match in color, size, shape, or surface texture  Asymmetry can be a result of rapid enlargement of a mole, the development of a raised area on a previously flat lesion, scaling, ulceration, bleeding or scabbing within the mole    Any mole that starts to demonstrate "asymmetry" should be examined promptly by a board certified dermatologist  Border: Healthy moles tend to have discrete, even borders  The border of a melanoma often blends into the normal skin and does not sharply delineate the mole from normal skin  Any mole that starts to demonstrate "uneven borders" should be examined promptly by a board certified dermatologist      Color: Healthy moles tend to be one color throughout  Melanomas tend to be made up of different colors ranging from dark black, blue, white, or red  Any mole that demonstrates a color change should be examined promptly by a board certified dermatologist      Diameter: Healthy moles tend to be smaller than 0 6 cm in size; an exception are "congenital nevi" that can be larger  Melanomas tend to grow and can often be greater than 0 6 cm (1/4 of an inch, or the size of a pencil eraser)  This is only a guideline, and many normal moles may be larger than 0 6 cm without being unhealthy  Any mole that starts to change in size (small to bigger or bigger to smaller) should be examined promptly by a board certified dermatologist      Evolving: Healthy moles tend to "stay the same "  Melanomas may often show signs of change or evolution such as a change in size, shape, color, or elevation  Any mole that starts to itch, bleed, crust, burn, hurt, or ulcerate or demonstrate a change or evolution should be examined promptly by a board certified dermatologist       Dysplastic Nevi  Dysplastic moles are moles that fit the ABCDE rules of melanoma but are not identified as melanomas when examined under the microscope  They may indicate an increased risk of melanoma in that person  If there is a family history of melanoma, most experts agree that the person may be at an increased risk for developing a melanoma  Experts still do not agree on what dysplastic moles mean in patients without a personal or family history of melanoma    Dysplastic moles are usually larger than common moles and have different colors within it with irregular borders  The appearance can be very similar to a melanoma  Biopsies of dysplastic moles may show abnormalities which are different from a regular mole  Melanoma  Malignant melanoma is a type of skin cancer that can be deadly if it spreads throughout the body  The incidence of melanoma in the [place] is growing faster than any other cancer  Melanoma usually grows near the surface of the skin for a period of time, and then begins to grow deeper into the skin  Once it grows deeper into the skin, the risk of spread to other organs greatly increases  Therefore, early detection and removal of a malignant melanoma may result in a better chance at a complete cure; removal after the tumor has spread may not be as effective, leading to worse clinical outcomes such as death  The true rate of nevus transformation into a melanoma is unknown  It has been estimated that the lifetime risk for any acquired melanocytic nevus on any 21year-old individual transforming into melanoma by age [de-identified] is 0 03% (1 in 3,164) for men and 0 009% (1 in 10,800) for women  The appearance of a "new mole" remains one of the most reliable methods for identifying a malignant melanoma  Occasionally, melanomas appear as rapidly growing, blue-black, dome-shaped bumps within a previous mole or previous area of normal skin  Other times, melanomas are suspected when a mole suddenly appears or changes  Itching, burning, or pain in a pigmented lesion should increase suspicion, but most patients with early melanoma have no skin discomfort whatsoever  Melanoma can occur anywhere on the skin, including areas that are difficult for self-examination  Many melanomas are first noticed by other family members  Suspicious-looking moles may be removed for microscopic examination  You may be able to prevent death from melanoma by doing two simple things:    1   Try to avoid unnecessary sun exposure and protect your skin when it is exposed to the sun   People who live near the equator, people who have intermittent exposures to large amounts of sun, and people who have had sunburns in childhood or adolescence have an increased risk for melanoma  Sun sense and vigilant sun protection may be keys to helping to prevent melanoma  We recommend wearing UPF-rated sun protective clothing and sunglasses whenever possible and applying a moisturizer-sunscreen combination product (SPF 50+) such as Neutrogena Daily Defense to sun exposed areas of skin at least three times a day  2  Have your moles regularly examined by a board certified dermatologist AND by yourself or a family member/friend at home  We recommend that you have your moles examined at least once a year by a board certified dermatologist   Use your birthday as an annual reminder to have your "Birthday Suit" (I e , your skin) examined; it is a nice birthday gift to yourself to know that your skin is healthy appearing! Additionally, at-home self examinations may be helpful for detecting a possible melanoma  Use the ABCDEs we discussed and check your moles once a month at home  3  LENTIGO      Assessment and Plan:  Based on a thorough discussion of this condition and the management approach to it (including a comprehensive discussion of the known risks, side effects and potential benefits of treatment), the patient (family) agrees to implement the following specific plan:   Recommend SPF 48 + for sun protection    What is a lentigo? A lentigo is a pigmented flat or slightly raised lesion with a clearly defined edge  Unlike an ephelis (freckle), it does not fade in the winter months  There are several kinds of lentigo  The name lentigo originally referred to its appearance resembling a small lentil  The plural of lentigo is lentigines, although lentigos is also in common use  Who gets lentigines? Lentigines can affect males and females of all ages and races   Solar lentigines are especially prevalent in fair skinned adults  Lentigines associated with syndromes are present at birth or arise during childhood  What causes lentigines? Common forms of lentigo are due to exposure to ultraviolet radiation:   Sun damage including sunburn    Indoor tanning    Phototherapy, especially photochemotherapy (PUVA)    Ionizing radiation, eg radiation therapy, can also cause lentigines  Several familial syndromes associated with widespread lentigines originate from mutations in Stephen-MAP kinase, mTOR signaling and PTEN pathways  What are the clinical features of lentigines? Lentigines have been classified into several different types depending on what they look like, where they appear on the body, causative factors, and whether they are associated to other diseases or conditions  Lentigines may be solitary or more often, multiple  Most lentigines are smaller than 5 mm in diameter      Lentigo simplex   A precursor to junctional naevus    Arises during childhood and early adult life    Found on trunk and limbs    Small brown round or oval macule or thin plaque    Jagged or smooth edge    May have a dry surface    May disappear in time  Solar lentigo   A precursor to seborrhoeic keratosis    Found on chronically sun exposed sites such as hands, face, lower legs    May also follow sunburn to shoulders    Yellow, light or dark brown regular or irregular macule or thin plaque    May have a dry surface    Often has moth-eaten outline    Can slowly enlarge to several centimeters in diameter    May disappear, often through the process known as lichenoid keratosis    When atypical in appearance, may be difficult to distinguish from melanoma in situ  Ink spot lentigo   Also known as reticulated lentigo    Few in number compared to solar lentigines    Follows sunburn in very fair skinned individuals    Dark brown to black irregular ink spot-like macule  PUVA lentigo   Similar to ink spot lentigo but follows photochemotherapy (PUVA)    Location anywhere exposed to PUVA  Tanning bed lentigo   Similar to ink spot lentigo but follows indoor tanning    Location anywhere exposed to tanning bed  Radiation lentigo   Occurs in site of irradiation (accidental or therapeutic)    Associated with late-stage radiation dermatitis: epidermal atrophy, subcutaneous fibrosis, keratosis, telangiectasias  Melanotic macule   Mucosal surfaces or adjacent glabrous skin eg lip, vulva, penis, anus    Light to dark brown    Also called mucosal melanosis  Generalised lentigines   Found on any exposed or covered site from early childhood    Small macules may merge to form larger patches    Not associated with a syndrome    Also called lentigines profusa, multiple lentigines  Agminated lentigines   Naevoid eruption of lentigos confined to a single segmental area    Sharp demarcation in midline    May have associated neurological and developmental abnormalities  Patterned lentigines   Inherited tendency to lentigines on face, lips, buttocks, palms, soles    Recognised mainly in people of  ethnicity  Centrofacial neurodysraphic lentiginosis  Associated with mental retardation  Lentiginosis syndromes   Syndromes include LEOPARD/Quincy, Peutz-Jeghers, Laugier-Hunziker, Moynahan, Xeroderma pigmentosum, myxoma syndromes (JORGE, NAME, Watkins), Ruvalcaba-Myhre-Abernathy, Bannayan-Zonnana syndrome, Cowden disease (multiple hamartoma syndrome )    Inheritance is autosomal dominant; sporadic cases common    Widespread lentigines present at birth or arise in early childhood    Associated with neural, endocrine, and mesenchymal tumors    How is the diagnosis made? Lentigines are usually diagnosed clinically by their typical appearance   Concern regarding possibility of melanoma may lead to:   Dermatoscopy    Diagnostic excision biopsy    Histopathology of a lentigo shows:   Thickened epidermis    An increased number of melanocytes along the basal layer of epidermis    Unlike junctional melanocytic naevus, there are no nests of melanocytes    Increased melanin pigment within the keratinocytes    Additional features depending on type of lentigo    In contrast, an ephelis (freckle) shows sun-induced increased melanin within the keratinocytes, without an increase in number of cells  What is the treatment for lentigines? Most lentigines are left alone  Attempts to lighten them may not be successful  The following approaches are used:   SPF 50+ broad-spectrum sunscreen    Hydroquinone bleaching cream    Alpha hydroxy acids    Vitamin C    Retinoids    Azelaic acid    Chemical peels  Individual lesions can be permanently removed using:   Cryotherapy    Intense pulsed light    Pigment lasers    How can lentigines be prevented? Lentigines associated with exposure ultraviolet radiation can be prevented by very careful sun protection  Clothing is more successful at preventing new lentigines than are sunscreens  What is the outlook for lentigines? Lentigines usually persist  They may increase in number with age and sun exposure  Some in sun-protected sites may fade and disappear  4  DERMATOSIS PAPULOSA NIGRA    What is dermatosis papulosa nigra? Dermatosis papulosa nigra describes the presence of multiple, small, 1-5 mm diameter, smooth, firm, black or dark brown papules on face and neck  Who gets dermatosis papulosa nigra? Dermatosis papulosa nigra is common in people with skin of color, with Whiting skin phototype 4, 5 or 6  It affects up to 35% of American blacks  There is a lower frequency in  Americans with a fairer complexion  Dermatosis papulosa nigra also occurs among dark-skinned Asians and Polynesians, but the exact frequency is unknown  Females are more frequently affected than males  Dermatosis papulosa nigra usually begins in adolescence   The incidence, number and size of lesions increases with age  What is the cause of dermatosis papulosa nigra? The papules of dermatosis papulosa nigra are identical to small seborrhoeic keratoses  Dermatosis papulosa nigra is likely to be genetically determined with 40-50% of patients having a family history  It is believed to be due to a naevoid developmental defect of the hair follicle  What are the clinical features of dermatosis papulosa nigra? Dark coloured papules arise mainly on the cheeks and forehead but they may also be found on the neck, upper back and chest  Scaling, crusting and ulceration do not occur  The papules are symptomless but may be regarded as unsightly  How is dermatosis papulosa nigra diagnosed? No tests are needed as dermatosis papulosa nigra is diagnosed clinically  If there is any doubt a skin biopsy can be taken  Histology shows a seborrhoeic keratosis with markedly increased pigmentation of the basal layer of the epidermis  How is dermatosis papulosa nigra treated? Dermatosis papulosa nigra lesions are generally best left untreated  Complications of locally destructive treatment can include increased and decreased pigmentation, scarring and keloid formation  Treatment choices include curettage, freezing with liquid nitrogen (cryotherapy) and electrodessication followed by curettage  Nd:YAG laser has also recently been reported to achieve excellent cosmetic results  Treatment is kept superficial to minimise the risk of complications  5  XEROSIS ("DRY SKIN")    Physical Exam:   Anatomic Location Affected:  Generalized    Morphological Description:  Dry skin   Pertinent Positives:   Pertinent Negatives:     Additional History of Present Condition:  Patient used lotion     Assessment and Plan:  Based on a thorough discussion of this condition and the management approach to it (including a comprehensive discussion of the known risks, side effects and potential benefits of treatment), the patient (family) agrees to implement the following specific plan:  Use moisturizer like Eucerin,Cerave or Aveeno Cream 3 times a day for the dry skin           Dry skin refers to skin that feels dry to touch  Dry skin has a dull surface with a rough, scaly quality  The skin is less pliable and cracked  When dryness is severe, the skin may become inflamed and fissured  Although any body site can be dry, dry skin tends to affect the shins more than any other site  Dry skin is lacking moisture in the outer horny cell layer (stratum corneum) and this results in cracks in the skin surface  Dry skin is also called xerosis, xeroderma or asteatosis (lack of fat)  It can affect males and females of all ages  There is some racial variability in water and lipid content of the skin   Dry skin that starts in early childhood may be one of about 20 types of ichthyosis (fish-scale skin)  There is often a family history of dry skin   Dry skin is commonly seen in people with atopic dermatitis   Nearly everyone > 60 years has dry skin  Dry skin that begins later may be seen in people with certain diseases and conditions   Postmenopausal women   Hypothyroidism   Chronic renal disease    Malnutrition and weight loss    Subclinical dermatitis    Treatment with certain drugs such as oral retinoids, diuretics and epidermal growth factor receptor inhibitors    People exposed to a dry environment may experience dry skin   Low humidity: in desert climates or cool, windy conditions    Excessive air conditioning    Direct heat from a fire or fan heater    Excessive bathing    Contact with soap, detergents and solvents    Inappropriate topical agents such as alcohol    Frictional irritation from rough clothing or abrasives    Dry skin is due to abnormalities in the integrity of the barrier function of the stratum corneum, which is made up of corneocytes   There is an overall reduction in the lipids in the stratum corneum   Ratio of ceramides, cholesterol and free fatty acids may be normal or altered   There may be a reduction in the proliferation of keratinocytes   Keratinocyte subtypes change in dry skin with a decrease in keratins K1, K10 and increase in K5, K14     Involucrin (a protein) may be expressed early, increasing cell stiffness   The result is retention of corneocytes and reduced water-holding capacity  The inherited forms of ichthyosis are due to loss of function mutations in various genes (listed in parentheses below)  The clinical features of ichthyosis depend on the specific type of ichthyosis:   Ichthyosis vulgaris (FLG)   Recessive X-linked ichthyosis (STS)    Autosomal recessive congenital ichthyosis (ABCA12, TGM1, ALOXE3)    Keratinopathic ichthyoses (KRT1, KRT10, KRT2)    Acquired ichthyosis may be due to:   Metabolic factors: thyroid deficiency    Illness: lymphoma, internal malignancy, sarcoidosis, HIV infection    Drugs: nicotinic acid, kava, protein kinase inhibitors (eg EGFR inhibitors), hydroxyurea  Complications of dry skin:  Dry areas of skin may become itchy, indicating a form of eczema/dermatitis has developed   Atopic eczema -- especially in people with ichthyosis vulgaris    Eczema craquelé -- especially in elderly people  Also called asteatotic eczema    A dry form of nummular dermatitis/discoid eczema -- especially in people that wash their skin excessively  When the dry skin of an elderly person is itchy without a visible rash, it is sometimes called winter itch, 7th age itch, senile pruritus or chronic pruritus of the elderly      Other complications of dry skin may include:   Skin infection when bacteria or viruses penetrate a break in the skin surface    Overheating, especially in some forms of ichthyosis    Food allergy, eg, to peanuts, has been associated with filaggrin mutations    Contact allergy, eg, to nickel, has also been correlated with barrier function defects  How is the type of dry skin diagnosed? The type of dry skin is diagnosed by careful history and examination  In children:   Family history    Age of onset    Appearance at birth, if known    Distribution of dry skin    Other features, eg eczema, abnormal nails, hair, dentition, sight, hearing  In adults:   Medical history    Medications and topical preparations    Bathing frequency and use of soap    Evaluation of environmental factors that may contribute to dry skin  What is the treatment for dry skin? The mainstay of treatment of dry skin and ichthyosis is moisturisers/emollients  They should be applied liberally and often enough to:   Reduce itch    Improve the barrier function    Prevent entry of irritants, bacteria    Reduce transepidermal water loss  When considering which emollient is most suitable, consider:   Severity of the dryness    Tolerance    Personal preference    Cost and availability  Emollients generally work best if applied to damp skin, if pH is below 7 (acidic), and if containing humectants such as urea or propylene glycol  Additional treatments include:   Topical steroid if itchy or there is dermatitis -- choose an emollient base    Topical calcineurin inhibitors if topical steroids are unsuitable  How can dry skin be prevented? Eliminate aggravating factors:   Reduce the frequency of bathing   A humidifier in winter and air conditioner in summer    Compare having a short shower with a prolonged soak in a bath   Use lukewarm, not hot, water   Replace standard soap with a substitute such as a synthetic detergent cleanser, water-miscible emollient, bath oil, anti-pruritic tar oil, colloidal oatmeal etc     Apply an emollient liberally and often, particularly shortly after bathing, and when itchy  The drier the skin, the thicker this should be, especially on the hands  What is the outlook for dry skin?   A tendency to dry skin may persist life-long, or it may improve once contributing factors are controlled

## 2020-02-12 NOTE — PROGRESS NOTES
[address] [facility] Note     Patient Name: Tru Sanderson  Encounter Date: 02 12 20    Today's Chief Concerns:   Concern #1:  Nevi Left Ear    Past Medical History:  Have you ever had or currently have any of the following medical conditions or treatments? · HIV/AIDS: No  · Hepatitis B: No  · Hepatitis C: No   · Diabetes: No  · Tuberculosis: No  · Biologic Therapy/Chemotherapy: No  · Organ or Bone Marrow Transplantation: No  · Radiation Treatment: No  · Cancer (If Yes, which types)- No      Have you ever had any of the following skin conditions? · Melanoma? (If Yes, please provide more detail)- No  · Basal Cell Carcinoma: No  · Squamous Cell Carcinoma: No  · Sebaceous Cell Carcinoma: No  · Merkel Cell Carcinoma: No  · Angiosarcoma: No  · Blistering Sunburns: No  · Eczema: No  · Psoriasis: No    Social History:    What is your current Smoking Status? 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 Reading    Family history:  Do any of your "first degree relatives" (parent, brother, sister, or child) have any of the following conditions? · Melanoma? (If Yes, which relatives?) No  · Eczema: No  · Asthma: No  · Hay Fever/Seasonal Allergies: No  · Psoriasis: No  · Arthritis: No  · Thyroid Problems: YES  · Lupus/Connective Tissue Disease: No  · Diabetes: No  · Stroke: No  · Blood Clots: No  · IBD/Crohn's/Ulcerative Colitis: No  · Vitiligo: No  · Scarring/Keloids: No  · Severe Acne: No  · Pancreatic Cancer: No  · Other known Skin Condition? If Yes, what condition and which relatives?   No    Current Medications:    Current Outpatient Medications:     acetaminophen (TYLENOL) 500 mg tablet, Take 1,000 mg by mouth every 8 (eight) hours as needed, Disp: , Rfl:     cetirizine (ZyrTEC) 10 mg tablet, Take 1 tablet (10 mg total) by mouth daily as needed for allergies, Disp: 30 tablet, Rfl: 5    ibuprofen (MOTRIN) 600 mg tablet, Take 1 tablet (600 mg total) by mouth every 6 (six) hours as needed for mild pain, Disp: 30 tablet, Rfl: 0    albuterol (VENTOLIN HFA) 90 mcg/act inhaler, Inhale 2 puffs every 4 (four) hours as needed for wheezing (or chest tightness) (Patient not taking: Reported on 2/12/2020), Disp: 18 g, Rfl: 0    benzonatate (TESSALON PERLES) 100 mg capsule, Take 1-2 capsules (100-200 mg total) by mouth 3 (three) times a day as needed for cough (Patient not taking: Reported on 2/12/2020), Disp: 30 capsule, Rfl: 0    ferrous sulfate 324 (65 Fe) mg, Take 1 tablet (324 mg total) by mouth 2 (two) times a day before meals (Patient not taking: Reported on 2/12/2020), Disp: 60 tablet, Rfl: 5    fluticasone (FLONASE) 50 mcg/act nasal spray, 1 spray into each nostril daily as needed for allergies (Patient not taking: Reported on 2/12/2020), Disp: 16 g, Rfl: 3    Specific Alerts:    Have you been seen by a [facility]atologist in the last 3 years? No    Are you pregnant or planning to become pregnant? N/A    Are you currently or planning to be nursing or breast feeding? N/A    No Known Allergies    May we call your Preferred Phone number to discuss your specific medical information? YES    May we leave a detailed message that includes your specific medical information? YES    Have you traveled outside of the [facility] in the past 3 months? No    Do you currently have a pacemaker or defibrillator? No    Do you have any artificial heart valves, joints, plates, screws, rods, stents, pins, etc? No   - If Yes, were any placed within the last 2 years? Do you require any medications prior to a surgical procedure? No   - If Yes, for which procedure? na   - If Yes, what medications to you require? na    Are you taking any medications that cause you to bleed more easily ("blood thinners") No    Have you ever experienced a rapid heartbeat with epinephrine? No    Have you ever been treated with "gold" (gold sodium thiomalate) therapy? No    [facility] can help with wrinkles, "laugh lines," facial volume loss, "double chin," "love handles," age spots, and more  Are you interested in learning today about some of the skin enhancement procedures that we offer? (If Yes, please provide more detail) No    Review of Systems:  Have you recently had or currently have any of the following? · Fever or chills: No  · Night Sweats: No  · Headaches: No  · Weight Gain: No  · Weight Loss: No  · Blurry Vision: YES  · Nausea: No  · Vomiting: No  · Diarrhea: No  · Blood in Stool: No  · Abdominal Pain: No  · Itchy Skin: No  · Painful Joints: No  · Swollen Joints: No  · Muscle Pain: No  · Irregular Mole: No  · Sun Burn: No  · Dry Skin: No  · Skin Color Changes: No  · Scar or Keloid: No  · Cold Sores/Fever Blisters: No  · Bacterial Infections/MRSA: No  · Anxiety: No  · Depression: No  · Suicidal or Homicidal Thoughts: No      PHYSICAL EXAM:      Was a chaperone (Derm Clinical Assistant) present for the entirety of the Physical Exam? YES    Did the Dermatology Team specifically ask and  the patient on the importance of a Full Skin Exam to be sure that nothing is missed clinically?  YES    Did the patient request or accept a Full Skin Exam?  YES    Did the patient specifically refuse to have the areas "under-the-bra" examined by the Dermatologist? No    Did the patient specifically refuse to have the areas "under-the-underwear" examined by the Dermatologist? No      CONSTITUTIONAL:   Vitals:    02/12/20 1400   Temp: 98 4 °F (36 9 °C)   TempSrc: Tympanic   Weight: 66 7 kg (147 lb)   Height: 5' 1" (1 549 m)       PSYCH: Normal mood and affect  EYES: Normal conjunctiva  ENT: Normal lips and oral mucosa  CARDIOVASCULAR: No edema  RESPIRATORY: Normal respirations  HEME/LYMPH/IMMUNO:  No regional lymphadenopathy except as noted below in ASSESSMENT AND PLAN BY DIAGNOSIS    FULL ORGAN SYSTEM SKIN EXAM (SKIN)  Hair, Scalp, Ears, Face Normal except as noted below in Assessment   Neck, Cervical Chain Nodes Normal except as noted below in Assessment   Right Arm/Hand/Fingers Normal except as noted below in Assessment   Left Arm/Hand/Fingers Normal except as noted below in Assessment   Chest/Breasts/Axillae Viewed areas Normal except as noted below in Assessment   Abdomen, Umbilicus Normal except as noted below in Assessment   Back/Spine Normal except as noted below in Assessment   Groin/Genitalia/Buttocks Viewed areas Normal except as noted below in Assessment   Right Leg, Foot, Toes Normal except as noted below in Assessment   Left Leg, Foot, Toes Normal except as noted below in Assessment        ASSESSMENT AND PLAN BY DIAGNOSIS:    History of Present Condition:     Duration:  How long has this been an issue for you?    o  3 months   Location Affected:  Where on the body is this affecting you? o  Left Ear   Quality:  Is there any bleeding, pain, itch, burning/irritation, or redness associated with the skin lesion?    o  Irritated    Severity:  Describe any bleeding, pain, itch, burning/irritation, or redness on a scale of 1 to 10 (with 10 being the worst)  o  4   Timing:  Does this condition seem to be there pretty constantly or do you notice it more at specific times throughout the day?    o  Denies   Context:  Have you ever noticed that this condition seems to be associated with specific activities you do?    o  Denies   Modifying Factors:    o Anything that seems to make the condition worse?    -  Hair Washing  o What have you tried to do to make the condition better? -  Vix    Associated Signs and Symptoms:  Does this skin lesion seem to be associated with any of the following: Irritated       1   VERRUCA VULGARIS ("Common Warts")    Physical Exam:   Anatomic Location Affected:  Left conchal bowl   Morphological Description:  Papillomatous skin colored, verrucous papule       Additional History of Present Condition:  Present for 3 months Assessment and Plan:  Based on a thorough discussion of this condition and the management approach to it (including a comprehensive discussion of the known risks, side effects and potential benefits of treatment), the patient (family) agrees to implement the following specific plan:   Dicussed this is caused by a virus    Discussed removing with biopsy   Will schedule for removal after daughters wedding      2  MELANOCYTIC NEVI ("Moles")    Physical Exam:   Anatomic Location Affected:   Mostly on sun-exposed areas of the face, trunk and extremities    Morphological Description:  Scattered, 1-4mm round to ovoid, symmetrical-appearing, even bordered, skin colored to dark brown macules/papules, mostly in sun-exposed areas   Left lower eyelid margin with brown linear macule    Additional History of Present Condition:  Present for years    Assessment and Plan:  Based on a thorough discussion of this condition and the management approach to it (including a comprehensive discussion of the known risks, side effects and potential benefits of treatment), the patient (family) agrees to implement the following specific plan:   Discussed sun protection,   Monitor for changes  Benign, reassured   Re evaluate left lower eyelid at follow up    1      3  LENTIGO    Physical Exam:   Anatomic Location Affected: Face   Morphological Description:  Viva Colton Macules      Additional History of Present Condition:  Present for years    Assessment and Plan:  Based on a thorough discussion of this condition and the management approach to it (including a comprehensive discussion of the known risks, side effects and potential benefits of treatment), the patient (family) agrees to implement the following specific plan:  Monitor for changes  Benign, reassured           4   DERMATOSIS PAPULOSA NIGRA    Physical Exam:   Anatomic Location Affected:  Infraorbital, neck   Morphological Description:  Katie Chaves stuck on papules       Additional History of Present Condition:  Present for years     Monitor for changes   Benign, reassured      5   XEROSIS ("DRY SKIN")    Physical Exam:   Anatomic Location Affected:  Generalized    Morphological Description:  xerosis    Additional History of Present Condition:  Patient used lotion     Assessment and Plan:  Based on a thorough discussion of this condition and the management approach to it (including a comprehensive discussion of the known risks, side effects and potential benefits of treatment), the patient (family) agrees to implement the following specific plan:  Use moisturizer like Eucerin,Cerave or Aveeno Cream 3 times a day for the dry skin       Scribe Attestation    I,:   Suzanne Tu am acting as a scribe while in the presence of the attending physician :        I,:   Jarad Castellanos MD personally performed the services described in this documentation    as scribed in my presence :

## 2020-02-18 ENCOUNTER — OFFICE VISIT (OUTPATIENT)
Dept: HEMATOLOGY ONCOLOGY | Facility: CLINIC | Age: 55
End: 2020-02-18
Payer: COMMERCIAL

## 2020-02-18 VITALS
WEIGHT: 144.6 LBS | RESPIRATION RATE: 18 BRPM | TEMPERATURE: 97.6 F | DIASTOLIC BLOOD PRESSURE: 84 MMHG | HEART RATE: 97 BPM | OXYGEN SATURATION: 97 % | SYSTOLIC BLOOD PRESSURE: 122 MMHG | HEIGHT: 61 IN | BODY MASS INDEX: 27.3 KG/M2

## 2020-02-18 DIAGNOSIS — D50.0 IRON DEFICIENCY ANEMIA DUE TO CHRONIC BLOOD LOSS: Primary | ICD-10-CM

## 2020-02-18 PROCEDURE — 99214 OFFICE O/P EST MOD 30 MIN: CPT | Performed by: PHYSICIAN ASSISTANT

## 2020-02-18 PROCEDURE — 3008F BODY MASS INDEX DOCD: CPT | Performed by: PHYSICIAN ASSISTANT

## 2020-02-18 PROCEDURE — 1036F TOBACCO NON-USER: CPT | Performed by: PHYSICIAN ASSISTANT

## 2020-02-18 NOTE — PROGRESS NOTES
[facility] [place]  [id number]   [address]  [facility] [id number] [phone]  Progress Note  Harshil Sanderson, 1965, [id number]  2/18/2020    Assessment/Plan:  1  Iron deficiency anemia due to chronic blood loss  Patient reports compliance with oral iron and iron saturation and iron studies have returned favorable  Moreover, the patient has entered perimenopause and is menstruating infrequently  Patient was advised to continue on oral regiment but at a decreased dose  Patient was advised to continue on vitamin-C as recommended at her last visit  Regimen:  Iron 65 mg (elemental) once daily with vitamin C 500 mg  If patient's iron studies remained robust in the future, reducing dose to every other day will be advised  Additionally, may be discharged back to primary care provider for further observation     - CBC and differential; Future  - Iron Panel (Includes Ferritin, Iron Sat%, Iron, and TIBC); Future    The patient is scheduled for follow-up in approximately 6 months  Patient voiced agreement and understanding to the above  Patient knows to call the Hematology/Oncology office with any questions and concerns regarding the above  Goals and Barriers:    Current Goal:   Prolong Survival from Cancer  Barriers: None  Patient's Capacity to Self Care:  Patient able to self care   -------------------------------------------------------------------------------------------------------    Chief Complaint   Patient presents with    Follow-up       History of present illness:    This is a 48y o  year old female with long standing iron deficiency who has been taking oral iron supplements for several months/years prescribed by gynecologist secondary to heavy menstruation who presents to the Hematology office for evaluation of microcytic anemia      Patient notes that earlier this year she underwent an endometrial biopsy-2/22/18-negative for malignancy   Patient notes that she has been on a progesterone only pill, and has achieved later periods without clots  [address] evaluation was completed in May which demonstrated a hemoglobin decreased compared to that in February however RDW and MCV showed progressive signs of iron deficiency   Patient was referred to Hematology in May 2018 to discuss IV iron supplementation      Patient underwent IV Feraheme infusion x2 doses in May 2018   Patient tolerated doses without significant side effect   Patient has appreciated a significant response with iron saturation improved to 19% and ferritin at 89 ng/dL   Patient also followed up with gynecology who was able to start the patient on progesterone only supplements   Patient has noted a significant decrease in the amount of menstrual blood and perimenstural/ menstrual symptoms        In November, the patient noted to have continued vaginal bleeding   Patient was taking oral iron at that time however iron studies demonstrate significant decrease   Patient was symptomatic with fatigue   Patient underwent 2 additional infusions of IV Feraheme in November 2018      In March 2019 patient's blood work demonstrated an iron saturation of 32% with iron stores in the 60s  Patient underwent fibroidectomy in April 2019  Patient had some mild bleeding in May 2019 which ended up being controlled by oral Provera  In June 2019, patient's hemoglobin dropped to 12 grams/deciliter, from 14 grams/deciliter iron saturation is now at 8% and iron stores are at 5  Patient is asymptomatic  But notes noncompliance over the past several months with oral iron supplementation  Patient was restarted on oral supplementation instilled return in 3 months  In November 2019 patient reports compliance, an increase in iron studies with saturation 18% and iron stores in the 100 gram/deciliter range      In February 2020 patient's iron studies demonstrated iron saturation of 34%, ferritin at 175 in hemoglobin within normal limits  Interval history:  Patient is feeling very well  No symptoms of iron deficiency  Patient denies increase in menstrual bleeding and in fact states that it is becoming more irregular and less frequent  Review of Systems   Constitutional: Negative for appetite change, fatigue, fever and unexpected weight change  HENT: Negative for nosebleeds  Respiratory: Negative for cough, choking and shortness of breath  Negative hemoptysis  Cardiovascular: Negative for chest pain, palpitations and leg swelling  Gastrointestinal: Negative  Negative for abdominal distention, abdominal pain, anal bleeding, blood in stool, constipation, diarrhea, nausea and vomiting  Endocrine: Negative  Negative for cold intolerance  Genitourinary: Negative  Negative for hematuria, menstrual problem, vaginal bleeding, vaginal discharge and vaginal pain  Musculoskeletal: Negative  Negative for arthralgias, myalgias, neck pain and neck stiffness  Skin: Negative  Negative for color change, pallor and rash  Allergic/Immunologic: Negative  Negative for immunocompromised state  Neurological: Negative  Negative for weakness and headaches  Hematological: Negative for adenopathy  Does not bruise/bleed easily  All other systems reviewed and are negative        Current Outpatient Medications:     acetaminophen (TYLENOL) 500 mg tablet, Take 1,000 mg by mouth every 8 (eight) hours as needed, Disp: , Rfl:     cetirizine (ZyrTEC) 10 mg tablet, Take 1 tablet (10 mg total) by mouth daily as needed for allergies, Disp: 30 tablet, Rfl: 5    ibuprofen (MOTRIN) 600 mg tablet, Take 1 tablet (600 mg total) by mouth every 6 (six) hours as needed for mild pain, Disp: 30 tablet, Rfl: 0    albuterol (VENTOLIN HFA) 90 mcg/act inhaler, Inhale 2 puffs every 4 (four) hours as needed for wheezing (or chest tightness) (Patient not taking: Reported on 2/12/2020), Disp: 18 g, Rfl: 0   benzonatate (TESSALON PERLES) 100 mg capsule, Take 1-2 capsules (100-200 mg total) by mouth 3 (three) times a day as needed for cough (Patient not taking: Reported on 2/12/2020), Disp: 30 capsule, Rfl: 0    ferrous sulfate 324 (65 Fe) mg, Take 1 tablet (324 mg total) by mouth 2 (two) times a day before meals (Patient not taking: Reported on 2/12/2020), Disp: 60 tablet, Rfl: 5    fluticasone (FLONASE) 50 mcg/act nasal spray, 1 spray into each nostril daily as needed for allergies (Patient not taking: Reported on 2/12/2020), Disp: 16 g, Rfl: 3    No Known Allergies    Objective:   /84 (BP Location: Left arm, Patient Position: Sitting, Cuff Size: Adult)   Pulse 97   Temp 97 6 °F (36 4 °C)   Resp 18   Ht 5' 1" (1 549 m)   Wt 65 6 kg (144 lb 9 6 oz)   LMP 02/02/2020 (Approximate)   SpO2 97%   BMI 27 32 kg/m²   Wt Readings from Last 6 Encounters:   02/18/20 65 6 kg (144 lb 9 6 oz)   02/12/20 66 7 kg (147 lb)   02/05/20 64 4 kg (142 lb)   01/18/20 64 4 kg (142 lb)   01/16/20 64 8 kg (142 lb 12 8 oz)   11/21/19 66 7 kg (147 lb)       Physical Exam   Constitutional: She is oriented to person, place, and time  She appears well-developed and well-nourished  No distress  HENT:   Head: Normocephalic and atraumatic  Eyes: Pupils are equal, round, and reactive to light  No scleral icterus  Cardiovascular: Normal rate and regular rhythm  No murmur heard  Pulmonary/Chest: Effort normal  No respiratory distress  Musculoskeletal: She exhibits no edema  Neurological: She is alert and oriented to person, place, and time  Skin: Skin is warm  No rash noted  No pallor  Psychiatric: She has a normal mood and affect   Thought content normal        Pertinent Laboratory Results and Imaging Review:  Hospital Outpatient Visit on 02/05/2020   Component Date Value Ref Range Status    EXT Preg Test, Ur 02/05/2020 Negative  Negative Final    Control 02/05/2020 Valid  Valid Final   Appointment on 01/30/2020 Component Date Value Ref Range Status    WBC 01/30/2020 11 01* 4 31 - 10 16 Thousand/uL Final    RBC 01/30/2020 5 02  3 81 - 5 12 Million/uL Final    Hemoglobin 01/30/2020 14 6  11 5 - 15 4 g/dL Final    Hematocrit 01/30/2020 46 1  34 8 - 46 1 % Final    MCV 01/30/2020 92  82 - 98 fL Final    MCH 01/30/2020 29 1  26 8 - 34 3 pg Final    MCHC 01/30/2020 31 7  31 4 - 37 4 g/dL Final    RDW 01/30/2020 12 8  11 6 - 15 1 % Final    MPV 01/30/2020 11 4  8 9 - 12 7 fL Final    Platelets 42/01/4645 289  149 - 390 Thousands/uL Final    nRBC 01/30/2020 0  /100 WBCs Final    Neutrophils Relative 01/30/2020 65  43 - 75 % Final    Immat GRANS % 01/30/2020 0  0 - 2 % Final    Lymphocytes Relative 01/30/2020 27  14 - 44 % Final    Monocytes Relative 01/30/2020 5  4 - 12 % Final    Eosinophils Relative 01/30/2020 2  0 - 6 % Final    Basophils Relative 01/30/2020 1  0 - 1 % Final    Neutrophils Absolute 01/30/2020 7 21  1 85 - 7 62 Thousands/µL Final    Immature Grans Absolute 01/30/2020 0 04  0 00 - 0 20 Thousand/uL Final    Lymphocytes Absolute 01/30/2020 2 94  0 60 - 4 47 Thousands/µL Final    Monocytes Absolute 01/30/2020 0 52  0 17 - 1 22 Thousand/µL Final    Eosinophils Absolute 01/30/2020 0 20  0 00 - 0 61 Thousand/µL Final    Basophils Absolute 01/30/2020 0 10  0 00 - 0 10 Thousands/µL Final    Cholesterol 01/30/2020 204* 50 - 200 mg/dL Final      Cholesterol:       Desirable         <200 mg/dl       Borderline         200-239 mg/dl       High              >239           Triglycerides 01/30/2020 171* <=150 mg/dL Final      Triglyceride:     Normal          <150 mg/dl     Borderline High 150-199 mg/dl     High            200-499 mg/dl        Very High       >499 mg/dl    Specimen collection should occur prior to N-Acetylcysteine or Metamizole administration due to the potential for falsely depressed results      HDL, Direct 01/30/2020 43  >=40 mg/dL Final      HDL Cholesterol:       Low     <41 mg/dL  Specimen collection should occur prior to Metamizole administration due to the potential for falsley depressed results   LDL Calculated 01/30/2020 127* 0 - 100 mg/dL Final      LDL Cholesterol:     Optimal           <100 mg/dl     Near Optimal      100-129 mg/dl     Above Optimal       Borderline High 130-159 mg/dl       High            160-189 mg/dl       Very High       >189 mg/dl         This screening LDL is a calculated result  It does not have the accuracy of the Direct Measured LDL in the monitoring of patients with hyperlipidemia and/or statin therapy  Direct Measure LDL (ZBQ708) must be ordered separately in these patients   Iron Saturation 01/30/2020 34  % Final    TIBC 01/30/2020 274  250 - 450 ug/dL Final    Iron 01/30/2020 94  50 - 170 ug/dL Final      Patients treated with metal-binding drugs (ie  Deferoxamine) may have depressed iron values   Ferritin 01/30/2020 175  8 - 388 ng/mL Final         The following historical data was reviewed  Past Medical History:   Diagnosis Date    Allergic     Anemia     Disease of thyroid gland     enlarged thyroid    Intraductal hyperplasia without atypia of right breast 02/2016    c florid and sclerosing adenosis, background dense stromal fibrosis    Iron deficiency anemia due to chronic blood loss     transfused 2015 9/17: ferritin 2   H/H 8 8/31 7%    Menorrhagia with regular cycle     Varicella     without complication       Past Surgical History:   Procedure Laterality Date    BREAST BIOPSY Right 02/2016    intraductal hyperplasia without atypia,fibroid and sclerosing adenosis, and columnar cell change in a bsckround of dense stromal fibrosis no malignancy identified    TUBAL LIGATION      US GUIDED BREAST BIOPSY RIGHT COMPLETE Right 2/19/2016    UTERINE FIBROID SURGERY         Social History     Socioeconomic History    Marital status: /Civil Union     Spouse name: None    Number of children: None    Years of education: None    Highest education level: None   Occupational History    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   Social Needs    Financial resource strain: None    Food insecurity:     Worry: None     Inability: None    Transportation needs:     Medical: None     Non-medical: None   Tobacco Use    Smoking status: Never Smoker    Smokeless tobacco: Never Used   Substance and Sexual Activity    Alcohol use: Yes     Frequency: Monthly or less     Drinks per session: 1 or 2     Binge frequency: Never     Comment: rare    Drug use: No    Sexual activity: Yes     Partners: Male   Lifestyle    Physical activity:     Days per week: None     Minutes per session: None    Stress: None   Relationships    Social connections:     Talks on phone: None     Gets together: None     Attends [employer] service: None     Active member of club or organization: None     Attends meetings of clubs or organizations: None     Relationship status: None    Intimate partner violence:     Fear of current or ex partner: None     Emotionally abused: None     Physically abused: None     Forced sexual activity: None   Other Topics Concern    None   Social History Narrative    ** Merged History Encounter **    Inadequate exercise    [place]                Family History   Problem Relation Age of Onset    Breast cancer Mother         52's passed 78 yo c mets    Anemia Mother     Thyroid disease Mother         total thyroidectomy uncertain reason    Cancer Mother     Hyperlipidemia Father     No Known Problems Brother     ADD / ADHD Son     No Known Problems Daughter     No Known Problems Maternal Grandmother     No Known Problems Maternal Grandfather     No Known Problems Paternal Grandmother     No Known Problems Paternal Grandfather     No Known Problems Daughter     No Known Problems Paternal Aunt     No Known Problems Paternal Aunt     No Known Problems Paternal Aunt     No Known Problems Paternal Aunt     No Known Problems Paternal Aunt     Deep vein thrombosis Neg Hx     Mental illness Neg Hx     Hypertension Neg Hx     Coronary artery disease Neg Hx     Diabetes type II Neg Hx        Please note: This report has been generated by a voice recognition software system  Therefore there may be syntax, spelling, and/or grammatical errors  Please call if you have any questions

## 2020-02-26 ENCOUNTER — OFFICE VISIT (OUTPATIENT)
Dept: DERMATOLOGY | Facility: CLINIC | Age: 55
End: 2020-02-26
Payer: COMMERCIAL

## 2020-02-26 VITALS — WEIGHT: 143 LBS | BODY MASS INDEX: 28.07 KG/M2 | TEMPERATURE: 98.1 F | HEIGHT: 60 IN

## 2020-02-26 DIAGNOSIS — D48.5 NEOPLASM OF UNCERTAIN BEHAVIOR OF SKIN: Primary | ICD-10-CM

## 2020-02-26 PROCEDURE — 11102 TANGNTL BX SKIN SINGLE LES: CPT | Performed by: DERMATOLOGY

## 2020-02-26 PROCEDURE — 88305 TISSUE EXAM BY PATHOLOGIST: CPT | Performed by: STUDENT IN AN ORGANIZED HEALTH CARE EDUCATION/TRAINING PROGRAM

## 2020-02-26 PROCEDURE — 99213 OFFICE O/P EST LOW 20 MIN: CPT | Performed by: DERMATOLOGY

## 2020-02-26 NOTE — PATIENT INSTRUCTIONS
FOLLOW UP: VERRUCA VULGARIS     Assessment and Plan:  Based on a thorough discussion of this condition and the management approach to it (including a comprehensive discussion of the known risks, side effects and potential benefits of treatment), the patient (family) agrees to implement the following specific plan:   Patient wants to proceed with a skin shave biopsy today  Consent signed   Apply Vaseline and a band aid until it heals   We will call patient with pathology results  INFORMED CONSENT DISCUSSION AND POST-OPERATIVE INSTRUCTIONS FOR PATIENT    I   RATIONALE FOR PROCEDURE  I understand that a skin biopsy allows the Dermatologist to test a lesion or rash under the microscope to obtain a diagnosis  It usually involves numbing the area with numbing medication and removing a small piece of skin; sometimes the area will be closed with sutures  In this specific procedure, sutures are not usually needed  If any sutures are placed, then they are usually need to be removed in 2 weeks or less  I understand that my Dermatologist recommends that a skin "shave" biopsy be performed today  A local anesthetic, similar to the kind that a dentist uses when filling a cavity, will be injected with a very small needle into the skin area to be sampled  The injected skin and tissue underneath "will go to sleep and become numb so no pain should be felt afterwards  An instrument shaped like a tiny "razor blade" (shave biopsy instrument) will be used to cut a small piece of tissue and skin from the area so that a sample of tissue can be taken and examined more closely under the microscope  A slight amount of bleeding will occur, but it will be stopped with direct pressure and a pressure bandage and any other appropriate methods  I understands that a scar will form where the wound was created  Surgical ointment will be applied to help protect the wound  Sutures are not usually needed      II   RISKS AND POTENTIAL COMPLICATIONS   I understand the risks and potential complications of a skin biopsy include but are not limited to the following:   Bleeding   Infection   Pain   Scar/keloid   Skin discoloration   Incomplete Removal   Recurrence   Nerve Damage/Numbness/Loss of Function   Allergic Reaction to Anesthesia   Biopsies are diagnostic procedures and based on findings additional treatment or evaluation may be required   Loss or destruction of specimen resulting in no additional findings    My Dermatologist has explained to me the nature of the condition, the nature of the procedure, and the benefits to be reasonably expected compared with alternative approaches  My Dermatologist has discussed the likelihood of major risks or complications of this procedure including the specific risks listed above, such as bleeding, infection, and scarring/keloid  I understand that a scar is expected after this procedure  I understand that my physician cannot predict if the scar will form a "keloid," which extends beyond the borders of the wound that is created  A keloid is a thick, painful, and bumpy scar  A keloid can be difficult to treat, as it does not always respond well to therapy, which includes injecting cortisone directly into the keloid every few weeks  While this usually reduces the pain and size of the scar, it does not eliminate it  I understand that photographs may be taken before and after the procedure  These will be maintained as part of the medical providers confidential records and may not be made available to me  I further authorize the medical provider to use the photographs for teaching purposes or to illustrate scientific papers, books, or lectures if in his/her judgment, medical research, education, or science may benefit from its use  I have had an opportunity to fully inquire about the risks and benefits of this procedure and its alternatives     I have been given ample time and opportunity to ask questions and to seek a second opinion if I wished to do so  I acknowledge that there have specifically been no guarantees as to the cosmetic results from the procedure  I am aware that with any procedure there is always the possibility of an unexpected complication  III  POST-PROCEDURAL CARE (WHAT YOU WILL NEED TO DO "AFTER THE BIOPSY" TO OPTIMIZE HEALING)     Keep the area clean and dry  Try NOT to remove the bandage or get it wet for the first 24 hours   Gently clean the area and apply surgical ointment (such as Vaseline petrolatum ointment, which is available "over the counter" and not a prescription) to the biopsy site for up to 2 weeks straight  This acts to protect the wound from the outside world   Sutures are not usually placed in this procedure  If any sutures were placed, return for suture removal as instructed (generally 1 week for the face, 2 weeks for the body)   Take Acetaminophen (Tylenol) for discomfort, if no contraindications  Ibuprofen or aspirin could make bleeding worse   Call our office immediately for signs of infection: fever, chills, increased redness, warmth, tenderness, discomfort/pain, or pus or foul smell coming from the wound  WHAT TO DO IF THERE IS ANY BLEEDING? If a small amount of bleeding is noticed, place a clean cloth over the area and apply firm pressure for ten minutes  Check the wound after 10 minutes of direct pressure  If bleeding persists, try one more time for an additional 10 minutes of direct pressure on the area  If the bleeding becomes heavier or does not stop after the second attempt, or if you have any other questions about this procedure, then please call [facility] by calling [phone] (SKIN)  I hereby acknowledge that I have reviewed and verified the site with my Dermatologist and have requested and authorized my Dermatologist to proceed with the procedure

## 2020-02-26 NOTE — PROGRESS NOTES
[facility] Follow Up Note      Patient Name: Nikolai Sanderson  Encounter Date: 2/26/2020    Today's Chief Concerns:   Concern #1:  Follow up for wart       Current Medications:    Current Outpatient Medications:     acetaminophen (TYLENOL) 500 mg tablet, Take 1,000 mg by mouth every 8 (eight) hours as needed, Disp: , Rfl:     albuterol (VENTOLIN HFA) 90 mcg/act inhaler, Inhale 2 puffs every 4 (four) hours as needed for wheezing (or chest tightness) (Patient not taking: Reported on 2/12/2020), Disp: 18 g, Rfl: 0    benzonatate (TESSALON PERLES) 100 mg capsule, Take 1-2 capsules (100-200 mg total) by mouth 3 (three) times a day as needed for cough (Patient not taking: Reported on 2/12/2020), Disp: 30 capsule, Rfl: 0    cetirizine (ZyrTEC) 10 mg tablet, Take 1 tablet (10 mg total) by mouth daily as needed for allergies, Disp: 30 tablet, Rfl: 5    ferrous sulfate 324 (65 Fe) mg, Take 1 tablet (324 mg total) by mouth 2 (two) times a day before meals (Patient not taking: Reported on 2/12/2020), Disp: 60 tablet, Rfl: 5    fluticasone (FLONASE) 50 mcg/act nasal spray, 1 spray into each nostril daily as needed for allergies (Patient not taking: Reported on 2/12/2020), Disp: 16 g, Rfl: 3    ibuprofen (MOTRIN) 600 mg tablet, Take 1 tablet (600 mg total) by mouth every 6 (six) hours as needed for mild pain, Disp: 30 tablet, Rfl: 0    CONSTITUTIONAL:   Vitals:    02/26/20 1135   Temp: 98 1 °F (36 7 °C)   TempSrc: Tympanic   Weight: 64 9 kg (143 lb)   Height: 5' (1 524 m)           Specific Alerts:    Have you been seen by a [facility] Dermatologist in the last 3 years? YES    Are you pregnant or planning to become pregnant? No    Are you currently or planning to be nursing or breast feeding? No    No Known Allergies    May we call your Preferred Phone number to discuss your specific medical information? YES    May we leave a detailed message that includes your specific medical information? YES    Do you currently have a pacemaker or defibrillator? No    Do you have any artificial heart valves, joints, plates, screws, rods, stents, pins, etc? No   - If Yes, were any placed within the last 2 years? Are you taking any medications that cause you to bleed more easily ("blood thinners") No    Have you ever experienced a rapid heartbeat with epinephrine? No    Have you ever been treated with "gold" (gold sodium thiomalate) therapy? No    Review of Systems:  Have you recently had or currently have any of the following? · Fever or chills: No  · Night Sweats: No  · Headaches: No  · Weight Gain: No  · Weight Loss: No  · Blurry Vision: No  · Nausea: No  · Vomiting: No  · Diarrhea: No  · Blood in Stool: No  · Abdominal Pain: No  · Itchy Skin: No  · Painful Joints: No  · Swollen Joints: No  · Muscle Pain: No  · Irregular Mole: No  · Sun Burn: No  · Dry Skin: No  · Skin Color Changes: No  · Scar or Keloid: No  · Cold Sores/Fever Blisters: No  · Bacterial Infections/MRSA: No  · Anxiety: No  · Depression: No  · Suicidal or Homicidal Thoughts: No    PSYCH: Normal mood and affect  EYES: Normal conjunctiva  ENT: Normal lips and oral mucosa  CARDIOVASCULAR: No edema  RESPIRATORY: Normal respirations      ORGAN SYSTEM SKIN EXAM (SKIN)  Ears, Face Normal except as noted below in Assessment       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     Physical Exam:   Anatomic Location Affected:  Left conchal bowl   Morphological Description:  0 7 x 0 4 cm Papillomatous skin colored verrucous papule       Additional History of Present Condition:     Previous Treatment: No treatment   3 months, irritated lesion on ear      Assessment and Plan:  Based on a thorough discussion of this condition and the management approach to it (including a comprehensive discussion of the known risks, side effects and potential benefits of treatment), the patient (family) agrees to implement the following specific plan:   Patient wants to proceed with a skin shave biopsy today  Consent signed   Apply Vaseline and  A band aid until it heals   We will call patient with pathology results  PROCEDURE SHAVE BIOPSY NOTE:     Performing Physician: Parveen Rodriguez    Anatomic Location; Clinical Description with size (cm); Pre-Op Diagnosis:   o Left conchal bowl; 0 7 cm x 0 4 cm verrucous, filiform papule, verruca versus squamous cell carcinoma   Post-op diagnosis: Same      Local anesthesia: 1% xylocaine with epi       Topical anesthesia: None     Hemostasis: Aluminum chloride       After obtaining informed consent  at which time there was a discussion about the purpose of biopsy  and low risks of infection and bleeding  The area was prepped and draped in the usual fashion  Anesthesia was obtained with 1% lidocaine with epinephrine  A shave biopsy to an appropriate sampling depth was obtained with a sterile blade (such as a 15-blade or DermaBlade)  The resulting wound was covered with surgical ointment and bandaged appropriately  The patient tolerated the procedure well without complications and was without signs of functional compromise  Specimen has been sent for review by Dermatopathology  Standard post-procedure care has been explained and has been included in written form within the patient's copy of Informed Consent  INFORMED CONSENT DISCUSSION AND POST-OPERATIVE INSTRUCTIONS FOR PATIENT    I   RATIONALE FOR PROCEDURE  I understand that a skin biopsy allows the Dermatologist to test a lesion or rash under the microscope to obtain a diagnosis  It usually involves numbing the area with numbing medication and removing a small piece of skin; sometimes the area will be closed with sutures  In this specific procedure, sutures are not usually needed  If any sutures are placed, then they are usually need to be removed in 2 weeks or less  I understand that my Dermatologist recommends that a skin "shave" biopsy be performed today    A local anesthetic, similar to the kind that a dentist uses when filling a cavity, will be injected with a very small needle into the skin area to be sampled  The injected skin and tissue underneath "will go to sleep and become numb so no pain should be felt afterwards  An instrument shaped like a tiny "razor blade" (shave biopsy instrument) will be used to cut a small piece of tissue and skin from the area so that a sample of tissue can be taken and examined more closely under the microscope  A slight amount of bleeding will occur, but it will be stopped with direct pressure and a pressure bandage and any other appropriate methods  I understands that a scar will form where the wound was created  Surgical ointment will be applied to help protect the wound  Sutures are not usually needed  II   RISKS AND POTENTIAL COMPLICATIONS   I understand the risks and potential complications of a skin biopsy include but are not limited to the following:   Bleeding   Infection   Pain   Scar/keloid   Skin discoloration   Incomplete Removal   Recurrence   Nerve Damage/Numbness/Loss of Function   Allergic Reaction to Anesthesia   Biopsies are diagnostic procedures and based on findings additional treatment or evaluation may be required   Loss or destruction of specimen resulting in no additional findings    My Dermatologist has explained to me the nature of the condition, the nature of the procedure, and the benefits to be reasonably expected compared with alternative approaches  My Dermatologist has discussed the likelihood of major risks or complications of this procedure including the specific risks listed above, such as bleeding, infection, and scarring/keloid  I understand that a scar is expected after this procedure  I understand that my physician cannot predict if the scar will form a "keloid," which extends beyond the borders of the wound that is created  A keloid is a thick, painful, and bumpy scar    A keloid can be difficult to treat, as it does not always respond well to therapy, which includes injecting cortisone directly into the keloid every few weeks  While this usually reduces the pain and size of the scar, it does not eliminate it  I understand that photographs may be taken before and after the procedure  These will be maintained as part of the medical providers confidential records and may not be made available to me  I further authorize the medical provider to use the photographs for teaching purposes or to illustrate scientific papers, books, or lectures if in his/her judgment, medical research, education, or science may benefit from its use  I have had an opportunity to fully inquire about the risks and benefits of this procedure and its alternatives  I have been given ample time and opportunity to ask questions and to seek a second opinion if I wished to do so  I acknowledge that there have specifically been no guarantees as to the cosmetic results from the procedure  I am aware that with any procedure there is always the possibility of an unexpected complication  III  POST-PROCEDURAL CARE (WHAT YOU WILL NEED TO DO "AFTER THE BIOPSY" TO OPTIMIZE HEALING)     Keep the area clean and dry  Try NOT to remove the bandage or get it wet for the first 24 hours   Gently clean the area and apply surgical ointment (such as Vaseline petrolatum ointment, which is available "over the counter" and not a prescription) to the biopsy site for up to 2 weeks straight  This acts to protect the wound from the outside world   Sutures are not usually placed in this procedure  If any sutures were placed, return for suture removal as instructed (generally 1 week for the face, 2 weeks for the body)   Take Acetaminophen (Tylenol) for discomfort, if no contraindications  Ibuprofen or aspirin could make bleeding worse       Call our office immediately for signs of infection: fever, chills, increased redness, warmth, tenderness, discomfort/pain, or pus or foul smell coming from the wound  WHAT TO DO IF THERE IS ANY BLEEDING? If a small amount of bleeding is noticed, place a clean cloth over the area and apply firm pressure for ten minutes  Check the wound after 10 minutes of direct pressure  If bleeding persists, try one more time for an additional 10 minutes of direct pressure on the area  If the bleeding becomes heavier or does not stop after the second attempt, or if you have any other questions about this procedure, then please call your [facility] Dermatologist by calling [phone] (SKIN)  I hereby acknowledge that I have reviewed and verified the site with my Dermatologist and have requested and authorized my Dermatologist to proceed with the procedure          Scribe Attestation    I,:   Gerhard Pena am acting as a scribe while in the presence of the attending physician :        I,:   Bryan Stephens MD personally performed the services described in this documentation    as scribed in my presence :

## 2020-02-28 ENCOUNTER — TELEPHONE (OUTPATIENT)
Dept: DERMATOLOGY | Facility: CLINIC | Age: 55
End: 2020-02-28

## 2020-02-28 NOTE — TELEPHONE ENCOUNTER
Follow Up Call     Pt saw: Teagan Choudhary Were you prescribed any medications:No    o If YES: did you pick them up at the pharmacy? no     Did we do a biopsy? Yes   o If YES: how does the biopsy site look? Looks like its healing     Would you recommend your family and friends to [facility]?  Yes     How satisfied were you with your visit on a scale of 1-10: 10

## 2020-03-03 NOTE — RESULT ENCOUNTER NOTE
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  Electronically signed by Michael Hernández MD on 3/2/2020 at 12:57 PM  Additional Information   All controls performed with the immunohistochemical stains reported above reacted appropriately    These tests were developed and their performance characteristics determined by [facility]  [facility] or [address] [place] [address]  They may not be cleared or approved by the [place]  Food and Drug Administration  The FDA has determined that such clearance or approval is not necessary  These tests are used for clinical purposes  They should not be regarded as investigational or for research  This laboratory has been approved by [place] 88, designated as a high-complexity laboratory and is qualified to perform these tests  Gross Description   A  The specimen is received in formalin, labeled with the patient's name and hospital number, and is designated "left conchal bowl"  The specimen consists of 1 tan keratotic non hair-bearing skin papule which measures 0 8 x 0 5 x 0 4 cm  The margin of resection is inked green, and the surface tips are inked red  Entirely submitted  One cassette, bisected, on a sponge      Note: The estimated total formalin fixation time based upon information provided by the submitting clinician and the standard processing schedule is under 72 hours  MSequino     Clinical Information   A  Left conchal bowl; Skin; Shave biopsy; 48year old female with a 0 7 cm x 0 4 cm verrucous, filiform papule, differential diagnosis: Verruca versus squamous cell carcinoma  ATTN: Dr Saurav Berry

## 2020-04-20 ENCOUNTER — TELEMEDICINE (OUTPATIENT)
Dept: FAMILY MEDICINE CLINIC | Facility: CLINIC | Age: 55
End: 2020-04-20
Payer: COMMERCIAL

## 2020-04-20 DIAGNOSIS — N93.8 DYSFUNCTIONAL UTERINE BLEEDING: ICD-10-CM

## 2020-04-20 DIAGNOSIS — T31.0 BURN (ANY DEGREE) INVOLVING LESS THAN 10% OF BODY SURFACE: Primary | ICD-10-CM

## 2020-04-20 DIAGNOSIS — T23.029A BURN INJURY OF SKIN OF FINGER: ICD-10-CM

## 2020-04-20 PROCEDURE — 99214 OFFICE O/P EST MOD 30 MIN: CPT | Performed by: PHYSICIAN ASSISTANT

## 2020-04-20 RX ORDER — IBUPROFEN 600 MG/1
600 TABLET ORAL EVERY 6 HOURS PRN
Qty: 30 TABLET | Refills: 0 | Status: SHIPPED | OUTPATIENT
Start: 2020-04-20 | End: 2020-09-29 | Stop reason: ALTCHOICE

## 2020-06-05 ENCOUNTER — OFFICE VISIT (OUTPATIENT)
Dept: FAMILY MEDICINE CLINIC | Facility: CLINIC | Age: 55
End: 2020-06-05
Payer: COMMERCIAL

## 2020-06-05 VITALS
HEIGHT: 60 IN | SYSTOLIC BLOOD PRESSURE: 126 MMHG | TEMPERATURE: 98.1 F | HEART RATE: 88 BPM | OXYGEN SATURATION: 97 % | DIASTOLIC BLOOD PRESSURE: 86 MMHG | BODY MASS INDEX: 29.72 KG/M2 | WEIGHT: 151.38 LBS

## 2020-06-05 DIAGNOSIS — R51.9 NONINTRACTABLE HEADACHE, UNSPECIFIED CHRONICITY PATTERN, UNSPECIFIED HEADACHE TYPE: ICD-10-CM

## 2020-06-05 DIAGNOSIS — M62.838 TRAPEZIUS MUSCLE SPASM: Primary | ICD-10-CM

## 2020-06-05 PROCEDURE — 3008F BODY MASS INDEX DOCD: CPT | Performed by: PHYSICIAN ASSISTANT

## 2020-06-05 PROCEDURE — 99213 OFFICE O/P EST LOW 20 MIN: CPT | Performed by: PHYSICIAN ASSISTANT

## 2020-06-05 PROCEDURE — 1036F TOBACCO NON-USER: CPT | Performed by: PHYSICIAN ASSISTANT

## 2020-06-05 RX ORDER — METHOCARBAMOL 500 MG/1
500 TABLET, FILM COATED ORAL 3 TIMES DAILY PRN
Qty: 30 TABLET | Refills: 0 | Status: SHIPPED | OUTPATIENT
Start: 2020-06-05 | End: 2021-03-19 | Stop reason: ALTCHOICE

## 2020-06-05 RX ORDER — NAPROXEN 500 MG/1
500 TABLET ORAL 2 TIMES DAILY PRN
Qty: 30 TABLET | Refills: 0 | Status: SHIPPED | OUTPATIENT
Start: 2020-06-05 | End: 2020-09-29

## 2020-06-29 ENCOUNTER — TELEPHONE (OUTPATIENT)
Dept: HEMATOLOGY ONCOLOGY | Facility: CLINIC | Age: 55
End: 2020-06-29

## 2020-08-28 ENCOUNTER — APPOINTMENT (OUTPATIENT)
Dept: LAB | Facility: CLINIC | Age: 55
End: 2020-08-28
Payer: COMMERCIAL

## 2020-08-28 DIAGNOSIS — D50.0 IRON DEFICIENCY ANEMIA DUE TO CHRONIC BLOOD LOSS: ICD-10-CM

## 2020-08-28 LAB
BASOPHILS # BLD AUTO: 0.08 THOUSANDS/ΜL (ref 0–0.1)
BASOPHILS NFR BLD AUTO: 1 % (ref 0–1)
EOSINOPHIL # BLD AUTO: 0.2 THOUSAND/ΜL (ref 0–0.61)
EOSINOPHIL NFR BLD AUTO: 2 % (ref 0–6)
ERYTHROCYTE [DISTWIDTH] IN BLOOD BY AUTOMATED COUNT: 12.8 % (ref 11.6–15.1)
FERRITIN SERPL-MCNC: 124 NG/ML (ref 8–388)
HCT VFR BLD AUTO: 45 % (ref 34.8–46.1)
HGB BLD-MCNC: 15 G/DL (ref 11.5–15.4)
IMM GRANULOCYTES # BLD AUTO: 0.03 THOUSAND/UL (ref 0–0.2)
IMM GRANULOCYTES NFR BLD AUTO: 0 % (ref 0–2)
IRON SATN MFR SERPL: 15 %
IRON SERPL-MCNC: 44 UG/DL (ref 50–170)
LYMPHOCYTES # BLD AUTO: 4.33 THOUSANDS/ΜL (ref 0.6–4.47)
LYMPHOCYTES NFR BLD AUTO: 34 % (ref 14–44)
MCH RBC QN AUTO: 30.1 PG (ref 26.8–34.3)
MCHC RBC AUTO-ENTMCNC: 33.3 G/DL (ref 31.4–37.4)
MCV RBC AUTO: 90 FL (ref 82–98)
MONOCYTES # BLD AUTO: 0.47 THOUSAND/ΜL (ref 0.17–1.22)
MONOCYTES NFR BLD AUTO: 4 % (ref 4–12)
NEUTROPHILS # BLD AUTO: 7.52 THOUSANDS/ΜL (ref 1.85–7.62)
NEUTS SEG NFR BLD AUTO: 59 % (ref 43–75)
NRBC BLD AUTO-RTO: 0 /100 WBCS
PLATELET # BLD AUTO: 320 THOUSANDS/UL (ref 149–390)
PMV BLD AUTO: 11.3 FL (ref 8.9–12.7)
RBC # BLD AUTO: 4.99 MILLION/UL (ref 3.81–5.12)
TIBC SERPL-MCNC: 284 UG/DL (ref 250–450)
WBC # BLD AUTO: 12.63 THOUSAND/UL (ref 4.31–10.16)

## 2020-08-28 PROCEDURE — 85025 COMPLETE CBC W/AUTO DIFF WBC: CPT

## 2020-08-28 PROCEDURE — 36415 COLL VENOUS BLD VENIPUNCTURE: CPT

## 2020-08-28 PROCEDURE — 83550 IRON BINDING TEST: CPT

## 2020-08-28 PROCEDURE — 82728 ASSAY OF FERRITIN: CPT

## 2020-08-28 PROCEDURE — 83540 ASSAY OF IRON: CPT

## 2020-08-31 ENCOUNTER — OFFICE VISIT (OUTPATIENT)
Dept: HEMATOLOGY ONCOLOGY | Facility: CLINIC | Age: 55
End: 2020-08-31
Payer: COMMERCIAL

## 2020-08-31 VITALS
WEIGHT: 150.4 LBS | HEART RATE: 100 BPM | HEIGHT: 60 IN | BODY MASS INDEX: 29.53 KG/M2 | OXYGEN SATURATION: 96 % | RESPIRATION RATE: 16 BRPM | SYSTOLIC BLOOD PRESSURE: 138 MMHG | DIASTOLIC BLOOD PRESSURE: 96 MMHG | TEMPERATURE: 97 F

## 2020-08-31 DIAGNOSIS — D50.0 IRON DEFICIENCY ANEMIA DUE TO CHRONIC BLOOD LOSS: Primary | ICD-10-CM

## 2020-08-31 PROBLEM — N93.9 ABNORMAL UTERINE BLEEDING (AUB): Status: RESOLVED | Noted: 2018-02-22 | Resolved: 2020-08-31

## 2020-08-31 PROBLEM — N92.0 MENORRHAGIA WITH REGULAR CYCLE: Status: RESOLVED | Noted: 2018-02-13 | Resolved: 2020-08-31

## 2020-08-31 PROCEDURE — 3008F BODY MASS INDEX DOCD: CPT | Performed by: PHYSICIAN ASSISTANT

## 2020-08-31 PROCEDURE — 99215 OFFICE O/P EST HI 40 MIN: CPT | Performed by: PHYSICIAN ASSISTANT

## 2020-08-31 PROCEDURE — 1036F TOBACCO NON-USER: CPT | Performed by: PHYSICIAN ASSISTANT

## 2020-08-31 RX ORDER — FERROUS SULFATE TAB EC 324 MG (65 MG FE EQUIVALENT) 324 (65 FE) MG
324 TABLET DELAYED RESPONSE ORAL
Qty: 60 TABLET | Refills: 5 | Status: SHIPPED | OUTPATIENT
Start: 2020-08-31 | End: 2021-06-23 | Stop reason: ALTCHOICE
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
## 2020-09-29 ENCOUNTER — OFFICE VISIT (OUTPATIENT)
Dept: FAMILY MEDICINE CLINIC | Facility: CLINIC | Age: 55
End: 2020-09-29
Payer: COMMERCIAL

## 2020-09-29 VITALS
OXYGEN SATURATION: 96 % | SYSTOLIC BLOOD PRESSURE: 138 MMHG | BODY MASS INDEX: 30.04 KG/M2 | HEART RATE: 100 BPM | TEMPERATURE: 98.2 F | WEIGHT: 153 LBS | RESPIRATION RATE: 18 BRPM | DIASTOLIC BLOOD PRESSURE: 88 MMHG | HEIGHT: 60 IN

## 2020-09-29 DIAGNOSIS — R51.9 NONINTRACTABLE HEADACHE, UNSPECIFIED CHRONICITY PATTERN, UNSPECIFIED HEADACHE TYPE: Primary | ICD-10-CM

## 2020-09-29 PROCEDURE — 3725F SCREEN DEPRESSION PERFORMED: CPT | Performed by: INTERNAL MEDICINE

## 2020-09-29 PROCEDURE — 1036F TOBACCO NON-USER: CPT | Performed by: INTERNAL MEDICINE

## 2020-09-29 PROCEDURE — 99213 OFFICE O/P EST LOW 20 MIN: CPT | Performed by: INTERNAL MEDICINE

## 2020-09-29 RX ORDER — NAPROXEN 500 MG/1
500 TABLET ORAL 2 TIMES DAILY WITH MEALS
Qty: 20 TABLET | Refills: 0 | Status: SHIPPED | OUTPATIENT
Start: 2020-09-29 | End: 2021-03-19 | Stop reason: ALTCHOICE

## 2020-09-29 NOTE — LETTER
September 29, 2020     Patient: Martell Sanderson   YOB: 1965   Date of Visit: 9/29/2020       To Whom it May Concern:    Harshil Sanderson is under my professional care  She was seen in my office on 9/29/2020  She may return to work on 9/29/2020  If you have any questions or concerns, please don't hesitate to call           Sincerely,          Isis Hay MD        CC: No Recipients
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
## 2020-10-07 ENCOUNTER — TELEPHONE (OUTPATIENT)
Dept: OTHER | Facility: OTHER | Age: 55
End: 2020-10-07

## 2020-10-15 ENCOUNTER — TRANSCRIBE ORDERS (OUTPATIENT)
Dept: ADMINISTRATIVE | Age: 55
End: 2020-10-15

## 2020-10-15 ENCOUNTER — APPOINTMENT (OUTPATIENT)
Dept: RADIOLOGY | Age: 55
End: 2020-10-15

## 2020-10-15 DIAGNOSIS — R76.11 POSITIVE PPD: Primary | ICD-10-CM

## 2020-10-15 DIAGNOSIS — R76.11 POSITIVE PPD: ICD-10-CM

## 2020-10-15 PROCEDURE — 71045 X-RAY EXAM CHEST 1 VIEW: CPT

## 2020-10-21 ENCOUNTER — TELEPHONE (OUTPATIENT)
Dept: FAMILY MEDICINE CLINIC | Facility: CLINIC | Age: 55
End: 2020-10-21

## 2020-10-23 ENCOUNTER — TELEPHONE (OUTPATIENT)
Dept: HEMATOLOGY ONCOLOGY | Facility: CLINIC | Age: 55
End: 2020-10-23

## 2020-12-26 ENCOUNTER — HOSPITAL ENCOUNTER (EMERGENCY)
Facility: HOSPITAL | Age: 55
Discharge: HOME | End: 2020-12-26
Attending: EMERGENCY MEDICINE | Admitting: EMERGENCY MEDICINE
Payer: OTHER MISCELLANEOUS

## 2020-12-26 ENCOUNTER — APPOINTMENT (EMERGENCY)
Dept: CT IMAGING | Facility: HOSPITAL | Age: 55
End: 2020-12-26
Payer: OTHER MISCELLANEOUS

## 2020-12-26 VITALS
HEART RATE: 89 BPM | RESPIRATION RATE: 18 BRPM | DIASTOLIC BLOOD PRESSURE: 70 MMHG | WEIGHT: 157.63 LBS | SYSTOLIC BLOOD PRESSURE: 144 MMHG | OXYGEN SATURATION: 97 % | TEMPERATURE: 97.3 F | BODY MASS INDEX: 30.78 KG/M2

## 2020-12-26 DIAGNOSIS — S09.90XA CLOSED HEAD INJURY, INITIAL ENCOUNTER: Primary | ICD-10-CM

## 2020-12-26 DIAGNOSIS — R51.9 HEADACHE: ICD-10-CM

## 2020-12-26 PROCEDURE — 99284 EMERGENCY DEPT VISIT MOD MDM: CPT | Performed by: PHYSICIAN ASSISTANT

## 2020-12-26 PROCEDURE — 70450 CT HEAD/BRAIN W/O DYE: CPT

## 2020-12-26 PROCEDURE — 99284 EMERGENCY DEPT VISIT MOD MDM: CPT

## 2020-12-26 RX ORDER — IBUPROFEN 600 MG/1
600 TABLET ORAL ONCE
Status: COMPLETED | OUTPATIENT
Start: 2020-12-26 | End: 2020-12-26

## 2020-12-26 RX ORDER — ACETAMINOPHEN 325 MG/1
650 TABLET ORAL ONCE
Status: COMPLETED | OUTPATIENT
Start: 2020-12-26 | End: 2020-12-26

## 2020-12-26 RX ADMIN — ACETAMINOPHEN 650 MG: 325 TABLET ORAL at 07:49

## 2020-12-26 RX ADMIN — IBUPROFEN 600 MG: 600 TABLET, FILM COATED ORAL at 07:49

## 2021-01-22 ENCOUNTER — APPOINTMENT (EMERGENCY)
Dept: CT IMAGING | Facility: HOSPITAL | Age: 56
End: 2021-01-22

## 2021-01-22 ENCOUNTER — HOSPITAL ENCOUNTER (EMERGENCY)
Facility: HOSPITAL | Age: 56
Discharge: HOME | End: 2021-01-22
Attending: EMERGENCY MEDICINE
Payer: COMMERCIAL

## 2021-01-22 VITALS
WEIGHT: 157.41 LBS | RESPIRATION RATE: 18 BRPM | HEART RATE: 78 BPM | BODY MASS INDEX: 30.74 KG/M2 | TEMPERATURE: 98 F | OXYGEN SATURATION: 96 % | DIASTOLIC BLOOD PRESSURE: 73 MMHG | SYSTOLIC BLOOD PRESSURE: 137 MMHG

## 2021-01-22 DIAGNOSIS — M54.2 NECK PAIN: ICD-10-CM
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
## 2021-01-22 PROCEDURE — 99284 EMERGENCY DEPT VISIT MOD MDM: CPT

## 2021-01-22 PROCEDURE — G1004 CDSM NDSC: HCPCS

## 2021-01-22 PROCEDURE — 96372 THER/PROPH/DIAG INJ SC/IM: CPT

## 2021-01-22 PROCEDURE — 72125 CT NECK SPINE W/O DYE: CPT

## 2021-01-22 PROCEDURE — 70450 CT HEAD/BRAIN W/O DYE: CPT

## 2021-01-22 PROCEDURE — 99284 EMERGENCY DEPT VISIT MOD MDM: CPT | Performed by: PHYSICIAN ASSISTANT

## 2021-01-22 RX ORDER — METHOCARBAMOL 750 MG/1
750 TABLET, FILM COATED ORAL EVERY 6 HOURS PRN
Qty: 20 TABLET | Refills: 0 | Status: SHIPPED | OUTPATIENT
Start: 2021-01-22 | End: 2021-06-23 | Stop reason: ALTCHOICE

## 2021-01-22 RX ORDER — METHOCARBAMOL 500 MG/1
1000 TABLET, FILM COATED ORAL ONCE
Status: COMPLETED | OUTPATIENT
Start: 2021-01-22 | End: 2021-01-22

## 2021-01-22 RX ORDER — KETOROLAC TROMETHAMINE 30 MG/ML
15 INJECTION, SOLUTION INTRAMUSCULAR; INTRAVENOUS ONCE
Status: COMPLETED | OUTPATIENT
Start: 2021-01-22 | End: 2021-01-22

## 2021-01-22 RX ADMIN — KETOROLAC TROMETHAMINE 15 MG: 30 INJECTION, SOLUTION INTRAMUSCULAR at 16:04

## 2021-01-22 RX ADMIN — METHOCARBAMOL 1000 MG: 500 TABLET ORAL at 16:04

## 2021-01-22 NOTE — DISCHARGE INSTRUCTIONS
DISCHARGE INSTRUCTIONS:    FOLLOW UP WITH YOUR PRIMARY CARE PROVIDER OR THE [address] [place] [address]  MAKE AN APPOINTMENT TO BE SEEN  TAKE TYLENOL OR MOTRIN FOR PAIN  TAKE ROBAXIN AS PRESCRIBED  IF RASH, SHORTNESS OF BREATH OR TROUBLE SWALLOWING, STOP TAKING THE MEDICATION AND BE SEEN  REST  IF SYMPTOMS WORSEN OR NEW SYMPTOMS ARISE, RETURN TO THE ER TO BE SEEN
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
## 2021-01-22 NOTE — Clinical Note
Harshil Sanderson was seen and treated in our emergency department on 1/22/2021  Diagnosis:     Harshil  may return to work on return date  She may return on this date: 01/23/2021         If you have any questions or concerns, please don't hesitate to call        Deborah Brown RN    ______________________________           _______________          _______________  Hospital Representative                              Date                                Time
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
## 2021-03-18 ENCOUNTER — TELEPHONE (OUTPATIENT)
Dept: HEMATOLOGY ONCOLOGY | Facility: CLINIC | Age: 56
End: 2021-03-18

## 2021-03-18 NOTE — TELEPHONE ENCOUNTER
Called patient to screen for covid  Patient cancelled her appointment and will call back to reschedule

## 2021-03-19 ENCOUNTER — HOSPITAL ENCOUNTER (EMERGENCY)
Facility: HOSPITAL | Age: 56
Discharge: HOME | End: 2021-03-19
Attending: EMERGENCY MEDICINE | Admitting: EMERGENCY MEDICINE
Payer: COMMERCIAL

## 2021-03-19 ENCOUNTER — APPOINTMENT (EMERGENCY)
Dept: RADIOLOGY | Facility: HOSPITAL | Age: 56
End: 2021-03-19
Payer: COMMERCIAL

## 2021-03-19 VITALS
SYSTOLIC BLOOD PRESSURE: 120 MMHG | WEIGHT: 161.6 LBS | TEMPERATURE: 98.1 F | HEART RATE: 78 BPM | OXYGEN SATURATION: 97 % | RESPIRATION RATE: 20 BRPM | BODY MASS INDEX: 31.56 KG/M2 | DIASTOLIC BLOOD PRESSURE: 72 MMHG

## 2021-03-19 DIAGNOSIS — R07.89 ATYPICAL CHEST PAIN: Primary | ICD-10-CM

## 2021-03-19 LAB
ANION GAP SERPL CALCULATED.3IONS-SCNC: 9 MMOL/L (ref 4–13)
ATRIAL RATE: 82 BPM
BASOPHILS # BLD AUTO: 0.1 THOUSANDS/ΜL (ref 0–0.1)
BASOPHILS NFR BLD AUTO: 1 % (ref 0–1)
BUN SERPL-MCNC: 13 MG/DL (ref 5–25)
CALCIUM SERPL-MCNC: 10.2 MG/DL (ref 8.3–10.1)
CHLORIDE SERPL-SCNC: 106 MMOL/L (ref 100–108)
CO2 SERPL-SCNC: 26 MMOL/L (ref 21–32)
CREAT SERPL-MCNC: 0.82 MG/DL (ref 0.6–1.3)
EOSINOPHIL # BLD AUTO: 0.22 THOUSAND/ΜL (ref 0–0.61)
EOSINOPHIL NFR BLD AUTO: 2 % (ref 0–6)
ERYTHROCYTE [DISTWIDTH] IN BLOOD BY AUTOMATED COUNT: 13.3 % (ref 11.6–15.1)
GFR SERPL CREATININE-BSD FRML MDRD: 81 ML/MIN/1.73SQ M
GLUCOSE SERPL-MCNC: 121 MG/DL (ref 65–140)
HCT VFR BLD AUTO: 45 % (ref 34.8–46.1)
HGB BLD-MCNC: 14.5 G/DL (ref 11.5–15.4)
IMM GRANULOCYTES # BLD AUTO: 0.06 THOUSAND/UL (ref 0–0.2)
IMM GRANULOCYTES NFR BLD AUTO: 1 % (ref 0–2)
LYMPHOCYTES # BLD AUTO: 3.91 THOUSANDS/ΜL (ref 0.6–4.47)
LYMPHOCYTES NFR BLD AUTO: 32 % (ref 14–44)
MCH RBC QN AUTO: 29.7 PG (ref 26.8–34.3)
MCHC RBC AUTO-ENTMCNC: 32.2 G/DL (ref 31.4–37.4)
MCV RBC AUTO: 92 FL (ref 82–98)
MONOCYTES # BLD AUTO: 0.67 THOUSAND/ΜL (ref 0.17–1.22)
MONOCYTES NFR BLD AUTO: 6 % (ref 4–12)
NEUTROPHILS # BLD AUTO: 7.33 THOUSANDS/ΜL (ref 1.85–7.62)
NEUTS SEG NFR BLD AUTO: 58 % (ref 43–75)
NRBC BLD AUTO-RTO: 0 /100 WBCS
P AXIS: 65 DEGREES
PLATELET # BLD AUTO: 306 THOUSANDS/UL (ref 149–390)
PMV BLD AUTO: 11.9 FL (ref 8.9–12.7)
POTASSIUM SERPL-SCNC: 4.1 MMOL/L (ref 3.5–5.3)
PR INTERVAL: 124 MS
QRS AXIS: 49 DEGREES
QRSD INTERVAL: 74 MS
QT INTERVAL: 340 MS
QTC INTERVAL: 397 MS
RBC # BLD AUTO: 4.88 MILLION/UL (ref 3.81–5.12)
SODIUM SERPL-SCNC: 141 MMOL/L (ref 136–145)
T WAVE AXIS: 39 DEGREES
TROPONIN I SERPL-MCNC: <0.02 NG/ML
TROPONIN I SERPL-MCNC: <0.02 NG/ML
VENTRICULAR RATE: 82 BPM
WBC # BLD AUTO: 12.29 THOUSAND/UL (ref 4.31–10.16)

## 2021-03-19 PROCEDURE — 71046 X-RAY EXAM CHEST 2 VIEWS: CPT

## 2021-03-19 PROCEDURE — 99285 EMERGENCY DEPT VISIT HI MDM: CPT

## 2021-03-19 PROCEDURE — 84484 ASSAY OF TROPONIN QUANT: CPT | Performed by: EMERGENCY MEDICINE

## 2021-03-19 PROCEDURE — 93005 ELECTROCARDIOGRAM TRACING: CPT

## 2021-03-19 PROCEDURE — 96374 THER/PROPH/DIAG INJ IV PUSH: CPT

## 2021-03-19 PROCEDURE — 99285 EMERGENCY DEPT VISIT HI MDM: CPT | Performed by: EMERGENCY MEDICINE

## 2021-03-19 PROCEDURE — 36415 COLL VENOUS BLD VENIPUNCTURE: CPT | Performed by: EMERGENCY MEDICINE

## 2021-03-19 PROCEDURE — 96375 TX/PRO/DX INJ NEW DRUG ADDON: CPT

## 2021-03-19 PROCEDURE — 96361 HYDRATE IV INFUSION ADD-ON: CPT

## 2021-03-19 PROCEDURE — 93010 ELECTROCARDIOGRAM REPORT: CPT | Performed by: INTERNAL MEDICINE

## 2021-03-19 PROCEDURE — 80048 BASIC METABOLIC PNL TOTAL CA: CPT | Performed by: EMERGENCY MEDICINE

## 2021-03-19 PROCEDURE — 85025 COMPLETE CBC W/AUTO DIFF WBC: CPT | Performed by: EMERGENCY MEDICINE

## 2021-03-19 RX ORDER — KETOROLAC TROMETHAMINE 30 MG/ML
15 INJECTION, SOLUTION INTRAMUSCULAR; INTRAVENOUS ONCE
Status: COMPLETED | OUTPATIENT
Start: 2021-03-19 | End: 2021-03-19

## 2021-03-19 RX ORDER — ONDANSETRON 2 MG/ML
4 INJECTION INTRAMUSCULAR; INTRAVENOUS ONCE
Status: COMPLETED | OUTPATIENT
Start: 2021-03-19 | End: 2021-03-19

## 2021-03-19 RX ADMIN — ONDANSETRON 4 MG: 2 INJECTION INTRAMUSCULAR; INTRAVENOUS at 19:22

## 2021-03-19 RX ADMIN — SODIUM CHLORIDE 1000 ML: 0.9 INJECTION, SOLUTION INTRAVENOUS at 19:21

## 2021-03-19 RX ADMIN — KETOROLAC TROMETHAMINE 15 MG: 30 INJECTION, SOLUTION INTRAMUSCULAR at 19:24

## 2021-03-19 NOTE — Clinical Note
Harshil Sanderson was seen and treated in our emergency department on 3/19/2021  Diagnosis:     Harshil  may return to work on return date  She may return on this date: 03/22/2021         If you have any questions or concerns, please don't hesitate to call        Mack    ______________________________           _______________          _______________  Hospital Representative                              Date                                Time

## 2021-03-19 NOTE — ED PROVIDER NOTES
History  Chief Complaint   Patient presents with    Chest Pain     Pt repots CP and pressure that began last night  Pt states pain has worsened and also has nausea  A 56 yo female with pmhx of anemia; presents with chest pain that has been intermittent since last night  Pt describes the chest pain as a pressure sensation across the anterior chest   Pain is nonradiating  Pain lasts for a brief period of time before resolving, last episode upon arrival to the ED  Pt has not noticed any exacerbating factors  Pt does have associated nausea, but denies vomiting  Pt also reports earlier today she developed thoracic back pain, which has been constant and made worse with movement  Pt otherwise denies fever, chills, cough, SOB, abd pain, V/D, peripheral edema and rashes  A/P: Chest pain, associated with nausea  Reproducible tenderness along parasternal region  EKG completed and within normal limits  Given age, will check labs for ACS, anemia, renal impairment and electrolyte abnormality  Will obtain CXR to evaluate for cardiopulmonary disease  Thoracic back pain, reproducible tenderness throughout thoracic region  Suspect muscular etiology  Will treat symptomatically  History provided by:  Patient and medical records      Prior to Admission Medications   Prescriptions Last Dose Informant Patient Reported?  Taking?   acetaminophen (TYLENOL) 500 mg tablet 3/19/2021 at Unknown time Self Yes Yes   Sig: Take 1,000 mg by mouth every 8 (eight) hours as needed   albuterol (VENTOLIN HFA) 90 mcg/act inhaler  Self No No   Sig: Inhale 2 puffs every 4 (four) hours as needed for wheezing (or chest tightness)   Patient not taking: Reported on 2/12/2020   cetirizine (ZyrTEC) 10 mg tablet  Self No No   Sig: Take 1 tablet (10 mg total) by mouth daily as needed for allergies   ferrous sulfate 324 (65 Fe) mg  Self No No   Sig: Take 1 tablet (324 mg total) by mouth 2 (two) times a day before meals   fluticasone (FLONASE) 50 mcg/act nasal spray  Self No No   Si spray into each nostril daily as needed for allergies   methocarbamol (ROBAXIN) 750 mg tablet 3/18/2021 at Unknown time  No Yes   Sig: Take 1 tablet (750 mg total) by mouth every 6 (six) hours as needed for muscle spasms      Facility-Administered Medications: None       Past Medical History:   Diagnosis Date    Allergic     Anemia     Disease of thyroid gland     enlarged thyroid    Intraductal hyperplasia without atypia of right breast 2016    c florid and sclerosing adenosis, background dense stromal fibrosis    Iron deficiency anemia due to chronic blood loss     transfused : ferritin 2   H/H 8  7%    Menorrhagia with regular cycle     Varicella     without complication       Past Surgical History:   Procedure Laterality Date    BREAST BIOPSY Right 2016    intraductal hyperplasia without atypia,fibroid and sclerosing adenosis, and columnar cell change in a bsckround of dense stromal fibrosis no malignancy identified    TUBAL LIGATION      US GUIDED BREAST BIOPSY RIGHT COMPLETE Right 2016    UTERINE FIBROID SURGERY         Family History   Problem Relation Age of Onset    Breast cancer Mother         52's passed 76 yo c mets    Anemia Mother     Thyroid disease Mother         total thyroidectomy uncertain reason    Cancer Mother     Hyperlipidemia Father     No Known Problems Brother     ADD / ADHD Son     No Known Problems Daughter     No Known Problems Maternal Grandmother     No Known Problems Maternal Grandfather     No Known Problems Paternal Grandmother     No Known Problems Paternal Grandfather     No Known Problems Daughter     No Known Problems Paternal Aunt     No Known Problems Paternal Aunt     No Known Problems Paternal Aunt     No Known Problems Paternal Aunt     No Known Problems Paternal Aunt     Deep vein thrombosis Neg Hx     Mental illness Neg Hx     Hypertension Neg Hx     Coronary artery disease Neg Hx     Diabetes type II Neg Hx      I have reviewed and agree with the history as documented  E-Cigarette/Vaping    E-Cigarette Use Never User      E-Cigarette/Vaping Substances     Social History     Tobacco Use    Smoking status: Never Smoker    Smokeless tobacco: Never Used   Substance Use Topics    Alcohol use: Yes     Frequency: Monthly or less     Drinks per session: 1 or 2     Binge frequency: Never     Comment: rare    Drug use: No       Review of Systems   Cardiovascular: Positive for chest pain  Gastrointestinal: Positive for nausea  Musculoskeletal: Positive for back pain  All other systems reviewed and are negative  Physical Exam  Physical Exam  General Appearance: alert and oriented, nad, non toxic appearing  Skin:  Warm, dry, intact  HEENT: atraumatic, normocephalic  Neck: Supple, trachea midline  Cardiac: RRR; no murmurs, rub, gallops  Pulmonary: lungs CTAB; no wheezes, rales, rhonchi  Parasternal tenderness appreciated bilaterally  Gastrointestinal: abdomen soft, nontender, nondistended; no guarding or rebound tenderness; good bowel sounds, no mass or bruits  Extremities:  Midline thoracic and bilateral paraspinal thoracic tenderness    No pedal edema, 2+ pulses; no calf tenderness, no clubbing, no cyanosis  Neuro:  no focal motor or sensory deficits, CN 2-12 grossly intact  Psych:  Normal mood and affect, normal judgement and insight      Vital Signs  ED Triage Vitals   Temperature Pulse Respirations Blood Pressure SpO2   03/19/21 1839 03/19/21 1838 03/19/21 1838 03/19/21 1838 03/19/21 1838   98 1 °F (36 7 °C) 97 20 146/88 98 %      Temp Source Heart Rate Source Patient Position - Orthostatic VS BP Location FiO2 (%)   03/19/21 1839 03/19/21 1838 03/19/21 1838 03/19/21 1838 --   Oral Monitor Lying Right arm       Pain Score       03/19/21 1838       Worst Possible Pain           Vitals:    03/19/21 1838 03/19/21 2002 03/19/21 2130 03/19/21 2230   BP: 146/88 123/72 121/88 120/72   Pulse: 97 70 82 78   Patient Position - Orthostatic VS: Lying Lying Sitting Sitting         Visual Acuity      ED Medications  Medications   ondansetron (ZOFRAN) injection 4 mg (4 mg Intravenous Given 3/19/21 1922)   ketorolac (TORADOL) injection 15 mg (15 mg Intravenous Given 3/19/21 1924)   sodium chloride 0 9 % bolus 1,000 mL (0 mL Intravenous Stopped 3/19/21 2258)       Diagnostic Studies  Results Reviewed     Procedure Component Value Units Date/Time    Troponin I [[id number]]  (Normal) Collected: 03/19/21 2219    Lab Status: Final result Specimen: Blood from Arm, Left Updated: 03/19/21 2246     Troponin I <0 02 ng/mL     Troponin I [[id number]]  (Normal) Collected: 03/19/21 1921    Lab Status: Final result Specimen: Blood from Arm, Left Updated: 03/19/21 1947     Troponin I <0 02 ng/mL     Basic metabolic panel [[id number]]  (Abnormal) Collected: 03/19/21 1921    Lab Status: Final result Specimen: Blood from Arm, Left Updated: 03/19/21 1941     Sodium 141 mmol/L      Potassium 4 1 mmol/L      Chloride 106 mmol/L      CO2 26 mmol/L      ANION GAP 9 mmol/L      BUN 13 mg/dL      Creatinine 0 82 mg/dL      Glucose 121 mg/dL      Calcium 10 2 mg/dL      eGFR 81 ml/min/1 73sq m     Narrative:      Kayla guidelines for Chronic Kidney Disease (CKD):     Stage 1 with normal or high GFR (GFR > 90 mL/min/1 73 square meters)    Stage 2 Mild CKD (GFR = 60-89 mL/min/1 73 square meters)    Stage 3A Moderate CKD (GFR = 45-59 mL/min/1 73 square meters)    Stage 3B Moderate CKD (GFR = 30-44 mL/min/1 73 square meters)    Stage 4 Severe CKD (GFR = 15-29 mL/min/1 73 square meters)    Stage 5 End Stage CKD (GFR <15 mL/min/1 73 square meters)  Note: GFR calculation is accurate only with a steady state creatinine    CBC and differential [[id number]]  (Abnormal) Collected: 03/19/21 1921    Lab Status: Final result Specimen: Blood from Arm, Left Updated: 03/19/21 1930     WBC 12 29 Thousand/uL      RBC 4 88 Million/uL      Hemoglobin 14 5 g/dL      Hematocrit 45 0 %      MCV 92 fL      MCH 29 7 pg      MCHC 32 2 g/dL      RDW 13 3 %      MPV 11 9 fL      Platelets 893 Thousands/uL      nRBC 0 /100 WBCs      Neutrophils Relative 58 %      Immat GRANS % 1 %      Lymphocytes Relative 32 %      Monocytes Relative 6 %      Eosinophils Relative 2 %      Basophils Relative 1 %      Neutrophils Absolute 7 33 Thousands/µL      Immature Grans Absolute 0 06 Thousand/uL      Lymphocytes Absolute 3 91 Thousands/µL      Monocytes Absolute 0 67 Thousand/µL      Eosinophils Absolute 0 22 Thousand/µL      Basophils Absolute 0 10 Thousands/µL                  XR chest 2 views   ED Interpretation by Bryce Addison DO (03/19 1924)   No acute disease      Final Result by Vincent Drake MD (03/20 0334)      No acute cardiopulmonary disease  Workstation performed: [id number]                    Procedures  Procedures   ECG 12 Lead Documentation  Date/Time: today/date: 3/21/2021  Performed by: Michel Minor    ECG reviewed by me, the ED Provider: yes    Patient location:  ED   Previous ECG:  No old for comparison    Rate:  82  ECG rate assessment: normal    Rhythm: sinus rhythm    Ectopy:  none    QRS axis:  Normal  Intervals: normal   Q waves: None   ST segments:  Normal  T waves: normal      Impression: Normal EKG      ECG 12 Lead Documentation  Date/Time: today/date: 3/21/2021, delta obtained at 22:20  Performed by: Michel Minor    ECG reviewed by me, the ED Provider: yes    Patient location:  ED   Previous ECG:  Compared to current, no change   Rate:  68  ECG rate assessment: normal    Rhythm: sinus rhythm    Ectopy:  none    QRS axis:  Normal  Intervals: normal   Q waves: None   ST segments:  Normal  T waves: normal      Impression: Normal EKG          ED Course  ED Course as of Mar 21 1222   Fri Mar 19, 2021   2000 Pt reports improvement in her symptoms at this time  Updated on lab results  Pt does express being under a lot of stress lately  Suspect symptoms are likely chest wall with possible anxiety  Given that symptoms have been intermittent since last night, will obtain delta trop and EKG  2247 Delta troponin and EKG within normal limits  Chest pain likely combination of musculoskeletal given his reproducible and possibly stress related  Will discharge home with PCP follow-up and strict return precautions  Troponin I: <0 02             HEART Risk Score      Most Recent Value   Heart Score Risk Calculator   History  0 Filed at: 03/19/2021 2003   ECG  0 Filed at: 03/19/2021 2003   Age  1 Filed at: 03/19/2021 2003   Risk Factors  0 Filed at: 03/19/2021 2003   Troponin  0 Filed at: 03/19/2021 2003   HEART Score  1 Filed at: 03/19/2021 2003                      SBIRT 20yo+      Most Recent Value   SBIRT (23 yo +)   In order to provide better care to our patients, we are screening all of our patients for alcohol and drug use  Would it be okay to ask you these screening questions? No Filed at: 03/19/2021 1847                    MDM    Disposition  Final diagnoses:   Atypical chest pain     Time reflects when diagnosis was documented in both MDM as applicable and the Disposition within this note     Time User Action Codes Description Comment    3/19/2021 10:49 PM Akilah Robertson Add [R07 89] Atypical chest pain       ED Disposition     ED Disposition Condition Date/Time Comment    Discharge Stable Fri Mar 19, 2021 10:49 PM Harshil Sanderson discharge to home/self care              Follow-up Information     Follow up With Specialties Details Why Contact Info Additional Information    Daxa Loving PA-C Family Medicine, Physician Assistant Schedule an appointment as soon as possible for a visit in 2 days For re-evaluation [address]  [address], [address] [place] [phone] [facility] Emergency Medicine Go to  If symptoms worsen [place] [phone]  [address] [facility], [address]  , [place], [place], [address]          Discharge Medication List as of 3/19/2021 10:50 PM      CONTINUE these medications which have NOT CHANGED    Details   acetaminophen (TYLENOL) 500 mg tablet Take 1,000 mg by mouth every 8 (eight) hours as needed, Starting Tue 8/13/2019, Historical Med      methocarbamol (ROBAXIN) 750 mg tablet Take 1 tablet (750 mg total) by mouth every 6 (six) hours as needed for muscle spasms, Starting Fri 1/22/2021, Normal      albuterol (VENTOLIN HFA) 90 mcg/act inhaler Inhale 2 puffs every 4 (four) hours as needed for wheezing (or chest tightness), Starting Thu 1/16/2020, Normal      cetirizine (ZyrTEC) 10 mg tablet Take 1 tablet (10 mg total) by mouth daily as needed for allergies, Starting Thu 1/16/2020, Normal      ferrous sulfate 324 (65 Fe) mg Take 1 tablet (324 mg total) by mouth 2 (two) times a day before meals, Starting Mon 8/31/2020, Normal      fluticasone (FLONASE) 50 mcg/act nasal spray 1 spray into each nostril daily as needed for allergies, Starting Thu 1/16/2020, Print           No discharge procedures on file      PDMP Review     None          ED Provider  Electronically Signed by           Jing Booth DO  03/21/21 0842

## 2021-03-20 LAB
ATRIAL RATE: 68 BPM
P AXIS: 55 DEGREES
PR INTERVAL: 120 MS
QRS AXIS: 53 DEGREES
QRSD INTERVAL: 64 MS
QT INTERVAL: 384 MS
QTC INTERVAL: 408 MS
T WAVE AXIS: 23 DEGREES
VENTRICULAR RATE: 68 BPM

## 2021-03-20 PROCEDURE — 93010 ELECTROCARDIOGRAM REPORT: CPT | Performed by: INTERNAL MEDICINE

## 2021-04-07 ENCOUNTER — APPOINTMENT (OUTPATIENT)
Dept: LAB | Facility: CLINIC | Age: 56
End: 2021-04-07
Payer: COMMERCIAL

## 2021-04-07 ENCOUNTER — TRANSCRIBE ORDERS (OUTPATIENT)
Dept: LAB | Facility: CLINIC | Age: 56
End: 2021-04-07

## 2021-04-07 DIAGNOSIS — Z13.1 SCREENING FOR DIABETES MELLITUS: ICD-10-CM

## 2021-04-07 DIAGNOSIS — D50.0 IRON DEFICIENCY ANEMIA DUE TO CHRONIC BLOOD LOSS: ICD-10-CM

## 2021-04-07 DIAGNOSIS — Z11.3 SCREENING EXAMINATION FOR VENEREAL DISEASE: Primary | ICD-10-CM

## 2021-04-07 DIAGNOSIS — Z72.89 OTHER PROBLEMS RELATED TO LIFESTYLE: ICD-10-CM

## 2021-04-07 DIAGNOSIS — Z13.220 SCREENING FOR HYPERLIPIDEMIA: ICD-10-CM

## 2021-04-07 DIAGNOSIS — Z11.3 SCREENING EXAMINATION FOR VENEREAL DISEASE: ICD-10-CM

## 2021-04-07 LAB
BASOPHILS # BLD AUTO: 0.08 THOUSANDS/ΜL (ref 0–0.1)
BASOPHILS NFR BLD AUTO: 1 % (ref 0–1)
CHOLEST SERPL-MCNC: 212 MG/DL (ref 50–200)
EOSINOPHIL # BLD AUTO: 0.24 THOUSAND/ΜL (ref 0–0.61)
EOSINOPHIL NFR BLD AUTO: 2 % (ref 0–6)
ERYTHROCYTE [DISTWIDTH] IN BLOOD BY AUTOMATED COUNT: 13.2 % (ref 11.6–15.1)
EST. AVERAGE GLUCOSE BLD GHB EST-MCNC: 117 MG/DL
FERRITIN SERPL-MCNC: 115 NG/ML (ref 8–388)
HBA1C MFR BLD: 5.7 %
HCT VFR BLD AUTO: 47.1 % (ref 34.8–46.1)
HDLC SERPL-MCNC: 43 MG/DL
HGB BLD-MCNC: 15 G/DL (ref 11.5–15.4)
IMM GRANULOCYTES # BLD AUTO: 0.04 THOUSAND/UL (ref 0–0.2)
IMM GRANULOCYTES NFR BLD AUTO: 0 % (ref 0–2)
IRON SATN MFR SERPL: 16 %
IRON SERPL-MCNC: 49 UG/DL (ref 50–170)
LDLC SERPL CALC-MCNC: 131 MG/DL (ref 0–100)
LYMPHOCYTES # BLD AUTO: 3.07 THOUSANDS/ΜL (ref 0.6–4.47)
LYMPHOCYTES NFR BLD AUTO: 31 % (ref 14–44)
MCH RBC QN AUTO: 29.4 PG (ref 26.8–34.3)
MCHC RBC AUTO-ENTMCNC: 31.8 G/DL (ref 31.4–37.4)
MCV RBC AUTO: 92 FL (ref 82–98)
MONOCYTES # BLD AUTO: 0.54 THOUSAND/ΜL (ref 0.17–1.22)
MONOCYTES NFR BLD AUTO: 5 % (ref 4–12)
NEUTROPHILS # BLD AUTO: 6.08 THOUSANDS/ΜL (ref 1.85–7.62)
NEUTS SEG NFR BLD AUTO: 61 % (ref 43–75)
NONHDLC SERPL-MCNC: 169 MG/DL
NRBC BLD AUTO-RTO: 0 /100 WBCS
PLATELET # BLD AUTO: 315 THOUSANDS/UL (ref 149–390)
PMV BLD AUTO: 11.7 FL (ref 8.9–12.7)
RBC # BLD AUTO: 5.11 MILLION/UL (ref 3.81–5.12)
RPR SER QL: NORMAL
TIBC SERPL-MCNC: 308 UG/DL (ref 250–450)
TRIGL SERPL-MCNC: 190 MG/DL
WBC # BLD AUTO: 10.05 THOUSAND/UL (ref 4.31–10.16)

## 2021-04-07 PROCEDURE — 83550 IRON BINDING TEST: CPT

## 2021-04-07 PROCEDURE — 87389 HIV-1 AG W/HIV-1&-2 AB AG IA: CPT

## 2021-04-07 PROCEDURE — 82728 ASSAY OF FERRITIN: CPT

## 2021-04-07 PROCEDURE — 83540 ASSAY OF IRON: CPT

## 2021-04-07 PROCEDURE — 83036 HEMOGLOBIN GLYCOSYLATED A1C: CPT

## 2021-04-07 PROCEDURE — 36415 COLL VENOUS BLD VENIPUNCTURE: CPT

## 2021-04-07 PROCEDURE — 80061 LIPID PANEL: CPT

## 2021-04-07 PROCEDURE — 85025 COMPLETE CBC W/AUTO DIFF WBC: CPT

## 2021-04-07 PROCEDURE — 86592 SYPHILIS TEST NON-TREP QUAL: CPT

## 2021-04-08 LAB — HIV 1+2 AB+HIV1 P24 AG SERPL QL IA: NORMAL

## 2021-05-06 ENCOUNTER — APPOINTMENT (EMERGENCY)
Dept: CT IMAGING | Facility: HOSPITAL | Age: 56
End: 2021-05-06
Payer: COMMERCIAL

## 2021-05-06 ENCOUNTER — HOSPITAL ENCOUNTER (EMERGENCY)
Facility: HOSPITAL | Age: 56
Discharge: HOME | End: 2021-05-07
Attending: EMERGENCY MEDICINE | Admitting: EMERGENCY MEDICINE
Payer: COMMERCIAL

## 2021-05-06 VITALS
WEIGHT: 157.19 LBS | DIASTOLIC BLOOD PRESSURE: 78 MMHG | OXYGEN SATURATION: 97 % | HEART RATE: 86 BPM | RESPIRATION RATE: 16 BRPM | BODY MASS INDEX: 30.7 KG/M2 | TEMPERATURE: 97.9 F | SYSTOLIC BLOOD PRESSURE: 135 MMHG

## 2021-05-06 DIAGNOSIS — R42 DIZZINESS: ICD-10-CM

## 2021-05-06 DIAGNOSIS — R53.83 FATIGUE: Primary | ICD-10-CM

## 2021-05-06 LAB
ANION GAP SERPL CALCULATED.3IONS-SCNC: 7 MMOL/L (ref 4–13)
ATRIAL RATE: 81 BPM
BACTERIA UR QL AUTO: ABNORMAL /HPF
BASOPHILS # BLD AUTO: 0.09 THOUSANDS/ΜL (ref 0–0.1)
BASOPHILS NFR BLD AUTO: 1 % (ref 0–1)
BILIRUB UR QL STRIP: NEGATIVE
BUN SERPL-MCNC: 11 MG/DL (ref 5–25)
CALCIUM SERPL-MCNC: 10.2 MG/DL (ref 8.3–10.1)
CHLORIDE SERPL-SCNC: 106 MMOL/L (ref 100–108)
CLARITY UR: CLEAR
CO2 SERPL-SCNC: 29 MMOL/L (ref 21–32)
COLOR UR: YELLOW
CREAT SERPL-MCNC: 1.04 MG/DL (ref 0.6–1.3)
EOSINOPHIL # BLD AUTO: 0.1 THOUSAND/ΜL (ref 0–0.61)
EOSINOPHIL NFR BLD AUTO: 1 % (ref 0–6)
ERYTHROCYTE [DISTWIDTH] IN BLOOD BY AUTOMATED COUNT: 13.1 % (ref 11.6–15.1)
GFR SERPL CREATININE-BSD FRML MDRD: 61 ML/MIN/1.73SQ M
GLUCOSE SERPL-MCNC: 103 MG/DL (ref 65–140)
GLUCOSE UR STRIP-MCNC: NEGATIVE MG/DL
HCT VFR BLD AUTO: 46.1 % (ref 34.8–46.1)
HGB BLD-MCNC: 14.9 G/DL (ref 11.5–15.4)
HGB UR QL STRIP.AUTO: ABNORMAL
IMM GRANULOCYTES # BLD AUTO: 0.11 THOUSAND/UL (ref 0–0.2)
IMM GRANULOCYTES NFR BLD AUTO: 1 % (ref 0–2)
KETONES UR STRIP-MCNC: NEGATIVE MG/DL
LEUKOCYTE ESTERASE UR QL STRIP: NEGATIVE
LYMPHOCYTES # BLD AUTO: 2.52 THOUSANDS/ΜL (ref 0.6–4.47)
LYMPHOCYTES NFR BLD AUTO: 17 % (ref 14–44)
MAGNESIUM SERPL-MCNC: 2.6 MG/DL (ref 1.6–2.6)
MCH RBC QN AUTO: 29.8 PG (ref 26.8–34.3)
MCHC RBC AUTO-ENTMCNC: 32.3 G/DL (ref 31.4–37.4)
MCV RBC AUTO: 92 FL (ref 82–98)
MONOCYTES # BLD AUTO: 0.46 THOUSAND/ΜL (ref 0.17–1.22)
MONOCYTES NFR BLD AUTO: 3 % (ref 4–12)
NEUTROPHILS # BLD AUTO: 11.99 THOUSANDS/ΜL (ref 1.85–7.62)
NEUTS SEG NFR BLD AUTO: 77 % (ref 43–75)
NITRITE UR QL STRIP: NEGATIVE
NON-SQ EPI CELLS URNS QL MICRO: ABNORMAL /HPF
NRBC BLD AUTO-RTO: 0 /100 WBCS
P AXIS: 81 DEGREES
PH UR STRIP.AUTO: 6.5 [PH] (ref 4.5–8)
PLATELET # BLD AUTO: 312 THOUSANDS/UL (ref 149–390)
PMV BLD AUTO: 10.8 FL (ref 8.9–12.7)
POTASSIUM SERPL-SCNC: 3.7 MMOL/L (ref 3.5–5.3)
PR INTERVAL: 132 MS
PROT UR STRIP-MCNC: NEGATIVE MG/DL
QRS AXIS: 66 DEGREES
QRSD INTERVAL: 74 MS
QT INTERVAL: 346 MS
QTC INTERVAL: 401 MS
RBC # BLD AUTO: 5 MILLION/UL (ref 3.81–5.12)
RBC #/AREA URNS AUTO: ABNORMAL /HPF
SODIUM SERPL-SCNC: 142 MMOL/L (ref 136–145)
SP GR UR STRIP.AUTO: >=1.03 (ref 1–1.03)
T WAVE AXIS: 2 DEGREES
TROPONIN I SERPL-MCNC: <0.02 NG/ML
TSH SERPL DL<=0.05 MIU/L-ACNC: 0.44 UIU/ML (ref 0.36–3.74)
UROBILINOGEN UR QL STRIP.AUTO: 0.2 E.U./DL
VENTRICULAR RATE: 81 BPM
WBC # BLD AUTO: 15.27 THOUSAND/UL (ref 4.31–10.16)
WBC #/AREA URNS AUTO: ABNORMAL /HPF

## 2021-05-06 PROCEDURE — 83735 ASSAY OF MAGNESIUM: CPT | Performed by: PHYSICIAN ASSISTANT

## 2021-05-06 PROCEDURE — 93010 ELECTROCARDIOGRAM REPORT: CPT

## 2021-05-06 PROCEDURE — 81001 URINALYSIS AUTO W/SCOPE: CPT

## 2021-05-06 PROCEDURE — 84484 ASSAY OF TROPONIN QUANT: CPT | Performed by: PHYSICIAN ASSISTANT

## 2021-05-06 PROCEDURE — 99285 EMERGENCY DEPT VISIT HI MDM: CPT | Performed by: PHYSICIAN ASSISTANT

## 2021-05-06 PROCEDURE — 93005 ELECTROCARDIOGRAM TRACING: CPT

## 2021-05-06 PROCEDURE — 70450 CT HEAD/BRAIN W/O DYE: CPT

## 2021-05-06 PROCEDURE — 96360 HYDRATION IV INFUSION INIT: CPT

## 2021-05-06 PROCEDURE — 80048 BASIC METABOLIC PNL TOTAL CA: CPT | Performed by: PHYSICIAN ASSISTANT

## 2021-05-06 PROCEDURE — 84443 ASSAY THYROID STIM HORMONE: CPT | Performed by: PHYSICIAN ASSISTANT

## 2021-05-06 PROCEDURE — 36415 COLL VENOUS BLD VENIPUNCTURE: CPT | Performed by: PHYSICIAN ASSISTANT

## 2021-05-06 PROCEDURE — G1004 CDSM NDSC: HCPCS

## 2021-05-06 PROCEDURE — 99284 EMERGENCY DEPT VISIT MOD MDM: CPT

## 2021-05-06 PROCEDURE — 85025 COMPLETE CBC W/AUTO DIFF WBC: CPT | Performed by: PHYSICIAN ASSISTANT

## 2021-05-06 RX ADMIN — SODIUM CHLORIDE 1000 ML: 0.9 INJECTION, SOLUTION INTRAVENOUS at 22:36

## 2021-05-06 NOTE — Clinical Note
Harshil Sanderson was seen and treated in our emergency department on 5/6/2021  Diagnosis:     Harshil    She may return on this date: 05/08/2021         If you have any questions or concerns, please don't hesitate to call        Camilla Goodpasture, PA-C    ______________________________           _______________          _______________  Hospital Representative                              Date                                Time

## 2021-05-07 NOTE — DISCHARGE INSTRUCTIONS
Please refer to the attached information for strict return instructions  If symptoms worsen or new symptoms develop please return to the ER  Please follow up with your primary care physician for re-evaluation  Drink plenty of fluids to stay hydrated

## 2021-05-08 NOTE — ED PROVIDER NOTES
History  Chief Complaint   Patient presents with    Medical Problem     pt reports taking medicine from home health client that she believed to be tylenol or motrin for her headache  however, after a while became drowsy  concerned med was something else besides tylenol or Merissa Gabby is a 56 yo F presenting with dizziness, fatigue, and lightheadedness over the past several hours  She reports that she was at a home health client's home when she developed a mild, frontal headache  She reports she was offered tylenol for this headache, of which she took three tablets  She notes shortly after taking these medications she began to feel very tired and lightheaded  She also notes she developed dizziness described as the room spinning around her which occurs with standing up quickly and resolves when lying down  She reports she is unsure if the tylenol may have been a PM variety containing diphenhydramine/antihistamines  Denies chest pain or dyspnea  Denies blurry/double vision  Denies numbness, tingling, or weakness in face or extremities  Denies illicit substance or alcohol use   She notes her headache resolved prior to arrival       History provided by:  Patient   used: No    Fatigue  Duration:  3 hours  Timing:  Constant  Chronicity:  New  Context: not alcohol use, not allergies, not drug use, not recent infection and not urinary tract infection    Context comment:  Medication intake  Relieved by:  None tried  Ineffective treatments:  None tried  Associated symptoms: dizziness    Associated symptoms: no abdominal pain, no aphasia, no chest pain, no cough, no dysuria, no numbness in extremities, no falls, no fever, no foul-smelling urine, no frequency, no headaches, no loss of consciousness, no melena, no myalgias, no nausea, no seizures, no shortness of breath, no syncope, no urgency, no vision change and no vomiting    Risk factors: new medications    Risk factors: no anemia, no coronary artery disease, no diabetes and no recent stressors        Prior to Admission Medications   Prescriptions Last Dose Informant Patient Reported? Taking?   acetaminophen (TYLENOL) 500 mg tablet Not Taking at Unknown time Self Yes No   Sig: Take 1,000 mg by mouth every 8 (eight) hours as needed   albuterol (VENTOLIN HFA) 90 mcg/act inhaler Not Taking at Unknown time Self No No   Sig: Inhale 2 puffs every 4 (four) hours as needed for wheezing (or chest tightness)   Patient not taking: Reported on 2020   cetirizine (ZyrTEC) 10 mg tablet Not Taking at Unknown time Self No No   Sig: Take 1 tablet (10 mg total) by mouth daily as needed for allergies   Patient not taking: Reported on 2021   ferrous sulfate 324 (65 Fe) mg Not Taking at Unknown time Self No No   Sig: Take 1 tablet (324 mg total) by mouth 2 (two) times a day before meals   Patient not taking: Reported on 2021   fluticasone (FLONASE) 50 mcg/act nasal spray Not Taking at Unknown time Self No No   Si spray into each nostril daily as needed for allergies   Patient not taking: Reported on 2021   methocarbamol (ROBAXIN) 750 mg tablet Not Taking at Unknown time  No No   Sig: Take 1 tablet (750 mg total) by mouth every 6 (six) hours as needed for muscle spasms   Patient not taking: Reported on 2021      Facility-Administered Medications: None       Past Medical History:   Diagnosis Date    Allergic     Anemia     Disease of thyroid gland     enlarged thyroid    Intraductal hyperplasia without atypia of right breast 2016    c florid and sclerosing adenosis, background dense stromal fibrosis    Iron deficiency anemia due to chronic blood loss     transfused : ferritin 2   H/H 8  7%    Menorrhagia with regular cycle     Varicella     without complication       Past Surgical History:   Procedure Laterality Date    BREAST BIOPSY Right 2016    intraductal hyperplasia without atypia,fibroid and sclerosing adenosis, and columnar cell change in a bsckround of dense stromal fibrosis no malignancy identified    TUBAL LIGATION      US GUIDED BREAST BIOPSY RIGHT COMPLETE Right 2/19/2016    UTERINE FIBROID SURGERY         Family History   Problem Relation Age of Onset    Breast cancer Mother         52's passed 78 yo c mets    Anemia Mother     Thyroid disease Mother         total thyroidectomy uncertain reason    Cancer Mother     Hyperlipidemia Father     No Known Problems Brother     ADD / ADHD Son     No Known Problems Daughter     No Known Problems Maternal Grandmother     No Known Problems Maternal Grandfather     No Known Problems Paternal Grandmother     No Known Problems Paternal Grandfather     No Known Problems Daughter     No Known Problems Paternal Aunt     No Known Problems Paternal Aunt     No Known Problems Paternal Aunt     No Known Problems Paternal Aunt     No Known Problems Paternal Aunt     Deep vein thrombosis Neg Hx     Mental illness Neg Hx     Hypertension Neg Hx     Coronary artery disease Neg Hx     Diabetes type II Neg Hx      I have reviewed and agree with the history as documented  E-Cigarette/Vaping    E-Cigarette Use Never User      E-Cigarette/Vaping Substances     Social History     Tobacco Use    Smoking status: Never Smoker    Smokeless tobacco: Never Used   Substance Use Topics    Alcohol use: Not Currently     Frequency: Monthly or less     Drinks per session: 1 or 2     Binge frequency: Never     Comment: rare    Drug use: No       Review of Systems   Constitutional: Positive for fatigue  Negative for chills and fever  HENT: Negative for congestion, rhinorrhea and sore throat  Eyes: Negative for pain and visual disturbance  Respiratory: Negative for cough, shortness of breath and wheezing  Cardiovascular: Negative for chest pain, palpitations and syncope  Gastrointestinal: Negative for abdominal pain, melena, nausea and vomiting     Genitourinary: Negative for dysuria, frequency and urgency  Musculoskeletal: Negative for back pain, falls, myalgias, neck pain and neck stiffness  Skin: Negative for rash and wound  Neurological: Positive for dizziness and light-headedness  Negative for seizures, loss of consciousness, syncope, weakness, numbness and headaches  Physical Exam  Physical Exam  Constitutional:       General: She is not in acute distress  Appearance: She is well-developed  She is not diaphoretic  Comments: Patient sleeping when I entered room  Easily aroused, alert and oriented x3  HENT:      Head: Normocephalic and atraumatic  Right Ear: External ear normal       Left Ear: External ear normal    Eyes:      Extraocular Movements:      Right eye: Normal extraocular motion and no nystagmus  Left eye: Normal extraocular motion and no nystagmus  Conjunctiva/sclera: Conjunctivae normal       Pupils: Pupils are equal, round, and reactive to light  Neck:      Musculoskeletal: Normal range of motion and neck supple  Cardiovascular:      Rate and Rhythm: Normal rate and regular rhythm  Heart sounds: Normal heart sounds  No murmur  No friction rub  No gallop  Pulmonary:      Effort: Pulmonary effort is normal  No respiratory distress  Breath sounds: Normal breath sounds  No wheezing  Abdominal:      General: There is no distension  Palpations: Abdomen is soft  Tenderness: There is no abdominal tenderness  There is no right CVA tenderness, left CVA tenderness or guarding  Lymphadenopathy:      Cervical: No cervical adenopathy  Skin:     General: Skin is warm and dry  Capillary Refill: Capillary refill takes less than 2 seconds  Findings: No erythema or rash  Neurological:      Mental Status: She is alert and oriented to person, place, and time  GCS: GCS eye subscore is 4  GCS verbal subscore is 5  GCS motor subscore is 6  Cranial Nerves: Cranial nerves are intact   No cranial nerve deficit or facial asymmetry  Sensory: Sensation is intact  No sensory deficit  Motor: Motor function is intact  No weakness or abnormal muscle tone  Coordination: Coordination is intact  Coordination normal  Finger-Nose-Finger Test and Heel to [facility] h[facility]ctor Test normal    Psychiatric:         Behavior: Behavior normal          Thought Content:  Thought content normal          Judgment: Judgment normal          Vital Signs  ED Triage Vitals   Temperature Pulse Respirations Blood Pressure SpO2   05/06/21 2124 05/06/21 2122 05/06/21 2122 05/06/21 2122 05/06/21 2122   97 9 °F (36 6 °C) 89 16 134/89 96 %      Temp Source Heart Rate Source Patient Position - Orthostatic VS BP Location FiO2 (%)   05/06/21 2124 05/06/21 2122 05/06/21 2122 05/06/21 2122 --   Oral Monitor Sitting Left arm       Pain Score       --                  Vitals:    05/06/21 2122 05/06/21 2359   BP: 134/89 135/78   Pulse: 89 86   Patient Position - Orthostatic VS: Sitting Lying         Visual Acuity      ED Medications  Medications   sodium chloride 0 9 % bolus 1,000 mL (0 mL Intravenous Stopped 5/6/21 2359)       Diagnostic Studies  Results Reviewed     Procedure Component Value Units Date/Time    Urine Microscopic [[id number]]  (Abnormal) Collected: 05/06/21 2243    Lab Status: Final result Specimen: Urine, Clean Catch Updated: 05/06/21 2346     RBC, UA 1-2 /hpf      WBC, UA 0-1 /hpf      Epithelial Cells Occasional /hpf      Bacteria, UA Occasional /hpf     Basic metabolic panel [[id number]]  (Abnormal) Collected: 05/06/21 2236    Lab Status: Final result Specimen: Blood from Arm, Right Updated: 05/06/21 2307     Sodium 142 mmol/L      Potassium 3 7 mmol/L      Chloride 106 mmol/L      CO2 29 mmol/L      ANION GAP 7 mmol/L      BUN 11 mg/dL      Creatinine 1 04 mg/dL      Glucose 103 mg/dL      Calcium 10 2 mg/dL      eGFR 61 ml/min/1 73sq m     Narrative:      [place] guidelines for Chronic Kidney Disease (CKD):    Stage 1 with normal or high GFR (GFR > 90 mL/min/1 73 square meters)    Stage 2 Mild CKD (GFR = 60-89 mL/min/1 73 square meters)    Stage 3A Moderate CKD (GFR = 45-59 mL/min/1 73 square meters)    Stage 3B Moderate CKD (GFR = 30-44 mL/min/1 73 square meters)    Stage 4 Severe CKD (GFR = 15-29 mL/min/1 73 square meters)    Stage 5 End Stage CKD (GFR <15 mL/min/1 73 square meters)  Note: GFR calculation is accurate only with a steady state creatinine    TSH, 3rd generation with Free T4 reflex [[id number]]  (Normal) Collected: 05/06/21 2236    Lab Status: Final result Specimen: Blood from Arm, Right Updated: 05/06/21 2307     TSH 3RD GENERATON 0 443 uIU/mL     Narrative:      Patients undergoing fluorescein dye angiography may retain small amounts of fluorescein in the body for 48-72 hours post procedure  Samples containing fluorescein can produce falsely depressed TSH values  If the patient had this procedure,a specimen should be resubmitted post fluorescein clearance        Magnesium [[id number]]  (Normal) Collected: 05/06/21 2236    Lab Status: Final result Specimen: Blood from Arm, Right Updated: 05/06/21 2307     Magnesium 2 6 mg/dL     Troponin I [[id number]]  (Normal) Collected: 05/06/21 2235    Lab Status: Final result Specimen: Blood from Arm, Right Updated: 05/06/21 2302     Troponin I <0 02 ng/mL     Urine Macroscopic, POC [[id number]]  (Abnormal) Collected: 05/06/21 2243    Lab Status: Final result Specimen: Urine Updated: 05/06/21 2244     Color, UA Yellow     Clarity, UA Clear     pH, UA 6 5     Leukocytes, UA Negative     Nitrite, UA Negative     Protein, UA Negative mg/dl      Glucose, UA Negative mg/dl      Ketones, UA Negative mg/dl      Urobilinogen, UA 0 2 E U /dl      Bilirubin, UA Negative     Blood, UA Trace     Specific Gravity, UA >=1 030    Narrative:      CLINITEK RESULT    CBC and differential [[id number]]  (Abnormal) Collected: 05/06/21 2236    Lab Status: Final result Specimen: Blood from Arm, Right Updated: 05/06/21 2243     WBC 15 27 Thousand/uL      RBC 5 00 Million/uL      Hemoglobin 14 9 g/dL      Hematocrit 46 1 %      MCV 92 fL      MCH 29 8 pg      MCHC 32 3 g/dL      RDW 13 1 %      MPV 10 8 fL      Platelets 990 Thousands/uL      nRBC 0 /100 WBCs      Neutrophils Relative 77 %      Immat GRANS % 1 %      Lymphocytes Relative 17 %      Monocytes Relative 3 %      Eosinophils Relative 1 %      Basophils Relative 1 %      Neutrophils Absolute 11 99 Thousands/µL      Immature Grans Absolute 0 11 Thousand/uL      Lymphocytes Absolute 2 52 Thousands/µL      Monocytes Absolute 0 46 Thousand/µL      Eosinophils Absolute 0 10 Thousand/µL      Basophils Absolute 0 09 Thousands/µL                  CT head without contrast   Final Result by Pamela Sweeney MD (05/06 2311)      No acute intracranial abnormality  Small fluid level in the right maxillary sinus could reflect acute sinusitis in the appropriate setting                    Workstation performed: [id number]                    Procedures  ECG 12 Lead Documentation Only    Date/Time: 5/6/2021 10:40 PM  Performed by: Madelyn Dyer PA-C  Authorized by: Madelyn Dyer PA-C     Indications / Diagnosis:  Dizziness  ECG reviewed by me, the ED Provider: yes    Patient location:  ED  Previous ECG:     Previous ECG:  Compared to current    Comparison ECG info:  Nonspecific T wave changes lateral leads noted    Similarity:  Changes noted    Comparison to cardiac monitor: Yes    Interpretation:     Interpretation: abnormal    Rate:     ECG rate:  81    ECG rate assessment: normal    Rhythm:     Rhythm: sinus rhythm    Ectopy:     Ectopy: none    QRS:     QRS axis:  Normal    QRS intervals:  Normal  Conduction:     Conduction: normal    ST segments:     ST segments:  Normal  T waves:     T waves: non-specific               ED Course                                           MDM  Number of Diagnoses or Management Options  Dizziness: Fatigue:   Diagnosis management comments: Generalized fatigue, lightheadedness, and waxing/waning dizziness described as room spinning around her since taking reportedly tylenol several hours ago  Notes taking only three tablets, but is unsure if could have been PM formulation  Notes dizziness occurs primarily with standing up quickly and resolved laying flat, but notes lightheadedness is persistent  Neurologic exam is nonfocal here  Initial EKG shows NSR with some nonspecific T wave changes in latera leads  However, no current chest pain or dyspnea  Will check labs including CBC for anemia, BMP for metabolic abnormality, check troponin for evidence of cardiac ischemia, check TSH given history of thyroid disease, check mag level  Will also check CT head given previous headache and onset of dizziness to exclude ICH/mass, although suspicion for CVA is low  Provide IV fluids here for possible dehydration  Suspicion remains that medication could have included antihistamine accounting for fatigue, but will obtain labs/imaging to exclude other physiologic cause  Amount and/or Complexity of Data Reviewed  Clinical lab tests: ordered and reviewed  Tests in the radiology section of CPT®: ordered and reviewed    Patient Progress  Patient progress: stable      Disposition  Final diagnoses:   Fatigue   Dizziness     Time reflects when diagnosis was documented in both MDM as applicable and the Disposition within this note     Time User Action Codes Description Comment    5/6/2021 11:53 PM Terrence Taylor Add [R53 83] Fatigue     5/6/2021 11:53 PM Terrence Taylor Add [R42] Dizziness       ED Disposition     ED Disposition Condition Date/Time Comment    Discharge Stable Thu May 6, 2021 11:53 PM Harshil Loomis [facility] discharge to home/self care              Follow-up Information     Follow up With Specialties Details Why Contact Info Additional Information    Nabil Pope PA-C Family Medicine, Physician Assistant Schedule an appointment as soon as possible for a visit   [address]  [address] [facility] [id number]  [address] [facility] Emergency Medicine  If symptoms worsen [facility] [id number] [address] [facility] [place], [address]  , [place], [place], [address]          Discharge Medication List as of 5/6/2021 11:54 PM      CONTINUE these medications which have NOT CHANGED    Details   acetaminophen (TYLENOL) 500 mg tablet Take 1,000 mg by mouth every 8 (eight) hours as needed, Starting Tue 8/13/2019, Historical Med      albuterol (VENTOLIN HFA) 90 mcg/act inhaler Inhale 2 puffs every 4 (four) hours as needed for wheezing (or chest tightness), Starting Thu 1/16/2020, Normal      cetirizine (ZyrTEC) 10 mg tablet Take 1 tablet (10 mg total) by mouth daily as needed for allergies, Starting Thu 1/16/2020, Normal      ferrous sulfate 324 (65 Fe) mg Take 1 tablet (324 mg total) by mouth 2 (two) times a day before meals, Starting Mon 8/31/2020, Normal      fluticasone (FLONASE) 50 mcg/act nasal spray 1 spray into each nostril daily as needed for allergies, Starting Thu 1/16/2020, Print      methocarbamol (ROBAXIN) 750 mg tablet Take 1 tablet (750 mg total) by mouth every 6 (six) hours as needed for muscle spasms, Starting Fri 1/22/2021, Normal           No discharge procedures on file      PDMP Review     None          ED Provider  Electronically Signed by           Bartolo Sifuentes PA-C  05/08/21 8259

## 2021-05-27 ENCOUNTER — HOSPITAL ENCOUNTER (EMERGENCY)
Facility: HOSPITAL | Age: 56
Discharge: HOME | End: 2021-05-27
Attending: EMERGENCY MEDICINE
Payer: COMMERCIAL

## 2021-05-27 VITALS
WEIGHT: 152.12 LBS | HEART RATE: 93 BPM | SYSTOLIC BLOOD PRESSURE: 138 MMHG | DIASTOLIC BLOOD PRESSURE: 84 MMHG | BODY MASS INDEX: 29.71 KG/M2 | TEMPERATURE: 99 F | OXYGEN SATURATION: 98 % | RESPIRATION RATE: 18 BRPM

## 2021-05-27 DIAGNOSIS — Z20.822 SUSPECTED COVID-19 VIRUS INFECTION: ICD-10-CM

## 2021-05-27 DIAGNOSIS — R68.89 FLU-LIKE SYMPTOMS: Primary | ICD-10-CM

## 2021-05-27 LAB — SARS-COV-2 RNA RESP QL NAA+PROBE: POSITIVE

## 2021-05-27 PROCEDURE — U0005 INFEC AGEN DETEC AMPLI PROBE: HCPCS | Performed by: PHYSICIAN ASSISTANT

## 2021-05-27 PROCEDURE — U0003 INFECTIOUS AGENT DETECTION BY NUCLEIC ACID (DNA OR RNA); SEVERE ACUTE RESPIRATORY SYNDROME CORONAVIRUS 2 (SARS-COV-2) (CORONAVIRUS DISEASE [COVID-19]), AMPLIFIED PROBE TECHNIQUE, MAKING USE OF HIGH THROUGHPUT TECHNOLOGIES AS DESCRIBED BY CMS-2020-01-R: HCPCS | Performed by: PHYSICIAN ASSISTANT

## 2021-05-27 PROCEDURE — 99284 EMERGENCY DEPT VISIT MOD MDM: CPT | Performed by: PHYSICIAN ASSISTANT

## 2021-05-27 PROCEDURE — 99283 EMERGENCY DEPT VISIT LOW MDM: CPT

## 2021-05-27 RX ORDER — IBUPROFEN 600 MG/1
600 TABLET ORAL ONCE
Status: COMPLETED | OUTPATIENT
Start: 2021-05-27 | End: 2021-05-27

## 2021-05-27 RX ORDER — ACETAMINOPHEN 325 MG/1
650 TABLET ORAL ONCE
Status: COMPLETED | OUTPATIENT
Start: 2021-05-27 | End: 2021-05-27

## 2021-05-27 RX ADMIN — IBUPROFEN 600 MG: 600 TABLET ORAL at 20:49

## 2021-05-27 RX ADMIN — ACETAMINOPHEN 650 MG: 325 TABLET, FILM COATED ORAL at 20:49

## 2021-05-28 NOTE — RESULT ENCOUNTER NOTE
Discussed positive COVID result with patient  Counseled on supportive care, and need to self quarantine for 10 days and the symptoms started  Return precautions discussed  Patient verbalized understanding and all questions were answered

## 2021-05-28 NOTE — ED PROVIDER NOTES
History  Chief Complaint   Patient presents with    Fever - 9 weeks to 74 years     Pt reports fever, chills, sore throat, and body aches for 2 days  60-year-old female with relevant past medical history significant for anemia who presents to the emergency department for complaint of fever and chills, sore throat, myalgias x2 days  She denies any recent direct sick contacts or COVID-19 exposures  There is no history of previous COVID-19 infection or vaccination  She denies chest pain or discomfort, shortness of breath, abdominal pain, nausea or vomiting, diarrhea, rash, weakness, loss of sense of taste or smell  She took Tylenol for symptoms, with some relief  Prior to Admission Medications   Prescriptions Last Dose Informant Patient Reported?  Taking?   acetaminophen (TYLENOL) 500 mg tablet  Self Yes No   Sig: Take 1,000 mg by mouth every 8 (eight) hours as needed   albuterol (VENTOLIN HFA) 90 mcg/act inhaler  Self No No   Sig: Inhale 2 puffs every 4 (four) hours as needed for wheezing (or chest tightness)   Patient not taking: Reported on 2020   cetirizine (ZyrTEC) 10 mg tablet  Self No No   Sig: Take 1 tablet (10 mg total) by mouth daily as needed for allergies   Patient not taking: Reported on 2021   ferrous sulfate 324 (65 Fe) mg  Self No No   Sig: Take 1 tablet (324 mg total) by mouth 2 (two) times a day before meals   Patient not taking: Reported on 2021   fluticasone (FLONASE) 50 mcg/act nasal spray  Self No No   Si spray into each nostril daily as needed for allergies   Patient not taking: Reported on 2021   methocarbamol (ROBAXIN) 750 mg tablet   No No   Sig: Take 1 tablet (750 mg total) by mouth every 6 (six) hours as needed for muscle spasms   Patient not taking: Reported on 2021      Facility-Administered Medications: None       Past Medical History:   Diagnosis Date    Allergic     Anemia     Disease of thyroid gland     enlarged thyroid    Intraductal hyperplasia without atypia of right breast 02/2016    c florid and sclerosing adenosis, background dense stromal fibrosis    Iron deficiency anemia due to chronic blood loss     transfused 2015 9/17: ferritin 2  H/H 8 8/31 7%    Menorrhagia with regular cycle     Varicella     without complication       Past Surgical History:   Procedure Laterality Date    BREAST BIOPSY Right 02/2016    intraductal hyperplasia without atypia,fibroid and sclerosing adenosis, and columnar cell change in a bsckround of dense stromal fibrosis no malignancy identified    TUBAL LIGATION      US GUIDED BREAST BIOPSY RIGHT COMPLETE Right 2/19/2016    UTERINE FIBROID SURGERY         Family History   Problem Relation Age of Onset    Breast cancer Mother         52's passed 78 yo c mets    Anemia Mother     Thyroid disease Mother         total thyroidectomy uncertain reason    Cancer Mother     Hyperlipidemia Father     No Known Problems Brother     ADD / ADHD Son     No Known Problems Daughter     No Known Problems Maternal Grandmother     No Known Problems Maternal Grandfather     No Known Problems Paternal Grandmother     No Known Problems Paternal Grandfather     No Known Problems Daughter     No Known Problems Paternal Aunt     No Known Problems Paternal Aunt     No Known Problems Paternal Aunt     No Known Problems Paternal Aunt     No Known Problems Paternal Aunt     Deep vein thrombosis Neg Hx     Mental illness Neg Hx     Hypertension Neg Hx     Coronary artery disease Neg Hx     Diabetes type II Neg Hx      I have reviewed and agree with the history as documented      E-Cigarette/Vaping    E-Cigarette Use Never User      E-Cigarette/Vaping Substances     Social History     Tobacco Use    Smoking status: Never Smoker    Smokeless tobacco: Never Used   Substance Use Topics    Alcohol use: Not Currently     Frequency: Monthly or less     Drinks per session: 1 or 2     Binge frequency: Never Comment: rare    Drug use: No       Review of Systems   Constitutional: Positive for chills and fever  Negative for appetite change and fatigue  HENT: Positive for sore throat  Negative for congestion, postnasal drip, rhinorrhea, sinus pressure, sinus pain, trouble swallowing and voice change  Respiratory: Negative for cough, chest tightness, shortness of breath and wheezing  Cardiovascular: Negative for chest pain and palpitations  Gastrointestinal: Negative for abdominal pain, diarrhea, nausea and vomiting  Genitourinary: Negative for decreased urine volume, difficulty urinating, dysuria, frequency, hematuria and urgency  Musculoskeletal: Positive for myalgias  Negative for back pain, neck pain and neck stiffness  Skin: Negative for color change and rash  Neurological: Negative for dizziness, weakness, light-headedness and headaches  Hematological: Negative for adenopathy  All other systems reviewed and are negative  Physical Exam  Physical Exam  Vitals signs reviewed  Constitutional:       General: She is awake  She is not in acute distress  Appearance: Normal appearance  She is well-developed  She is not ill-appearing or toxic-appearing  HENT:      Head: Normocephalic and atraumatic  Mouth/Throat:      Lips: Pink  Comments: Oropharyngeal exam deferred due to COVID-19 transmission risk precautions  Exam would not change clinical management and outcome  Eyes:      Extraocular Movements: Extraocular movements intact  Conjunctiva/sclera: Conjunctivae normal       Pupils: Pupils are equal, round, and reactive to light  Neck:      Musculoskeletal: Full passive range of motion without pain, normal range of motion and neck supple  Cardiovascular:      Rate and Rhythm: Normal rate and regular rhythm  Pulses: Normal pulses  Pulmonary:      Effort: Pulmonary effort is normal       Breath sounds: Normal breath sounds and air entry     Musculoskeletal: Normal range of motion  Skin:     General: Skin is warm  Capillary Refill: Capillary refill takes less than 2 seconds  Findings: No erythema, lesion or rash  Neurological:      Mental Status: She is alert and oriented to person, place, and time  Psychiatric:         Behavior: Behavior is cooperative  Vital Signs  ED Triage Vitals   Temperature Pulse Respirations Blood Pressure SpO2   05/27/21 2001 05/27/21 2001 05/27/21 2001 05/27/21 2001 05/27/21 2001   100 °F (37 8 °C) (!) 114 16 138/84 95 %      Temp Source Heart Rate Source Patient Position - Orthostatic VS BP Location FiO2 (%)   05/27/21 2001 05/27/21 2001 05/27/21 2001 05/27/21 2001 --   Oral Monitor Sitting Left arm       Pain Score       05/27/21 2049       Worst Possible Pain           Vitals:    05/27/21 2001 05/27/21 2134   BP: 138/84    Pulse: (!) 114 93   Patient Position - Orthostatic VS: Sitting          Visual Acuity      ED Medications  Medications   ibuprofen (MOTRIN) tablet 600 mg (600 mg Oral Given 5/27/21 2049)   acetaminophen (TYLENOL) tablet 650 mg (650 mg Oral Given 5/27/21 2049)       Diagnostic Studies  Results Reviewed     Procedure Component Value Units Date/Time    Novel Coronavirus (Covid-19),PCR [facility] - 24 Hour Routine [[id number]]  (Abnormal) Collected: 05/27/21 2049    Lab Status: Final result Specimen: Nares from Nasopharyngeal Swab Updated: 05/27/21 2142     SARS-CoV-2 Positive    Narrative: The specimen collection materials, transport medium, and/or testing methodology utilized in the production of these test results have been proven to be reliable in a limited validation with an abbreviated program under the Emergency Utilization Authorization provided by the FDA  Testing reported as "Presumptive positive" will be confirmed with secondary testing to ensure result accuracy    Clinical caution and judgement should be used with the interpretation of these results with consideration of the clinical impression and other laboratory testing  Testing reported as "Positive" or "Negative" has been proven to be accurate according to standard laboratory validation requirements  All testing is performed with control materials showing appropriate reactivity at standard intervals  No orders to display              Procedures  Procedures         ED Course                                           MDM  Number of Diagnoses or Management Options  Flu-like symptoms:   Suspected COVID-19 virus infection:   Diagnosis management comments: Will send test   Patient appears clinically well on exam   There is no indication for further workup at this time  Patient recommended to quarantine until test returns  CDC guidelines recommend self quarantine for at least 10 days if positive, with 24 hours completely fever free without the use of antipyretics  Instructed follow-up with PCP within 24 hours of ED discharge  Discussed supportive care measures  Amount and/or Complexity of Data Reviewed  Clinical lab tests: ordered  Decide to obtain previous medical records or to obtain history from someone other than the patient: yes  Obtain history from someone other than the patient: yes  Review and summarize past medical records: yes  Discuss the patient with other providers: yes    Patient Progress  Patient progress: stable (I discussed emergency department return parameters  I answered any and all questions the patient had regarding emergency department course of evaluation and treatment   The patient verbalized understanding of and agreement with plan   )      Disposition  Final diagnoses:   Flu-like symptoms   Suspected COVID-19 virus infection     Time reflects when diagnosis was documented in both MDM as applicable and the Disposition within this note     Time User Action Codes Description Comment    5/27/2021  9:32 PM Jacek Messer Add [R68 89] Flu-like symptoms     5/27/2021  9:32 PM Jacek Messer Add [Z20 822] Suspected COVID-19 virus infection       ED Disposition     ED Disposition Condition Date/Time Comment    Discharge Stable Thu May 27, 2021  9:32 PM Harshil Loomis Curet discharge to home/self care  Follow-up Information     Follow up With Specialties Details Why [address]  Emergency Department Emergency Medicine Go to  If symptoms worsen [place] [id number]  [facility] Emergency Department, [address] [facility] , [address] [place] [address], [address] PA-C Family Medicine, Physician Assistant Call  Within 24 hours of ED discharge [address] [address] [phone]             Discharge Medication List as of 5/27/2021  9:32 PM      CONTINUE these medications which have NOT CHANGED    Details   acetaminophen (TYLENOL) 500 mg tablet Take 1,000 mg by mouth every 8 (eight) hours as needed, Starting Tue 8/13/2019, Historical Med      albuterol (VENTOLIN HFA) 90 mcg/act inhaler Inhale 2 puffs every 4 (four) hours as needed for wheezing (or chest tightness), Starting Thu 1/16/2020, Normal      cetirizine (ZyrTEC) 10 mg tablet Take 1 tablet (10 mg total) by mouth daily as needed for allergies, Starting Thu 1/16/2020, Normal      ferrous sulfate 324 (65 Fe) mg Take 1 tablet (324 mg total) by mouth 2 (two) times a day before meals, Starting Mon 8/31/2020, Normal      fluticasone (FLONASE) 50 mcg/act nasal spray 1 spray into each nostril daily as needed for allergies, Starting Thu 1/16/2020, Print      methocarbamol (ROBAXIN) 750 mg tablet Take 1 tablet (750 mg total) by mouth every 6 (six) hours as needed for muscle spasms, Starting Fri 1/22/2021, Normal           No discharge procedures on file      PDMP Review     None          ED Provider  Electronically Signed by           Rajeev Buchanan PA-C  05/27/21 5831

## 2021-06-08 ENCOUNTER — TELEMEDICINE (OUTPATIENT)
Dept: FAMILY MEDICINE CLINIC | Facility: CLINIC | Age: 56
End: 2021-06-08
Payer: COMMERCIAL

## 2021-06-08 DIAGNOSIS — U07.1 COVID-19: Primary | ICD-10-CM

## 2021-06-08 PROCEDURE — 99441 PR PHYS/QHP TELEPHONE EVALUATION 5-10 MIN: CPT | Performed by: INTERNAL MEDICINE

## 2021-06-08 NOTE — PROGRESS NOTES
COVID-19 Outpatient Progress Note    Assessment/Plan:    Problem List Items Addressed This Visit     None         Disposition:       Patient is Amharic speaker, professional  was used  Patient was diagnosed with COVID-19 infection on May 27, 2021, she developed symptoms on May 24, 2021  She does not have any fevers, her symptoms almost completely resolved except of fatigue  She finished her isolation  She may return back to her normal routine  I have spent 10 minutes directly with the patient  Encounter provider Reena Barreto MD    Provider located at [address] [place] [id number] [phone]    Recent Visits  No visits were found meeting these conditions  Showing recent visits within past 7 days and meeting all other requirements     Today's Visits  Date Type Provider Dept   06/08/21 Didi Moore MD [place] [address] Primary Care   Showing today's visits and meeting all other requirements     Future Appointments  No visits were found meeting these conditions  Showing future appointments within next 150 days and meeting all other requirements        Patient agrees to participate in a virtual check in via telephone or video visit instead of presenting to the office to address urgent/immediate medical needs  Patient is aware this is a billable service  After connecting through Telephone, the patient was identified by name and date of birth  Harshil Sanderson was informed that this was a telemedicine visit and that the exam was being conducted confidentially over secure lines  My office door was closed  Harshil Sanderson acknowledged consent and understanding of privacy and security of the telemedicine visit  I informed the patient that I have reviewed her record in Epic and presented the opportunity for her to ask any questions regarding the visit today   The patient agreed to participate  Subjective:   Harshil Cht is a 55 y o  female who is concerned about COVID-19  Patient's symptoms include fatigue  Patient denies fever, chills, congestion, sore throat, cough, shortness of breath, nausea, vomiting, diarrhea and myalgias  Lab Results   Component Value Date    SARSCOV2 Positive (A) 05/27/2021     Past Medical History:   Diagnosis Date    Allergic     Anemia     Disease of thyroid gland     enlarged thyroid    Intraductal hyperplasia without atypia of right breast 02/2016    c florid and sclerosing adenosis, background dense stromal fibrosis    Iron deficiency anemia due to chronic blood loss     transfused 2015 9/17: ferritin 2   H/H 8 8/31 7%    Menorrhagia with regular cycle     Varicella     without complication     Past Surgical History:   Procedure Laterality Date    BREAST BIOPSY Right 02/2016    intraductal hyperplasia without atypia,fibroid and sclerosing adenosis, and columnar cell change in a bsckround of dense stromal fibrosis no malignancy identified    TUBAL LIGATION      US GUIDED BREAST BIOPSY RIGHT COMPLETE Right 2/19/2016    UTERINE FIBROID SURGERY       Current Outpatient Medications   Medication Sig Dispense Refill    acetaminophen (TYLENOL) 500 mg tablet Take 1,000 mg by mouth every 8 (eight) hours as needed      albuterol (VENTOLIN HFA) 90 mcg/act inhaler Inhale 2 puffs every 4 (four) hours as needed for wheezing (or chest tightness) (Patient not taking: Reported on 2/12/2020) 18 g 0    cetirizine (ZyrTEC) 10 mg tablet Take 1 tablet (10 mg total) by mouth daily as needed for allergies (Patient not taking: Reported on 5/6/2021) 30 tablet 5    ferrous sulfate 324 (65 Fe) mg Take 1 tablet (324 mg total) by mouth 2 (two) times a day before meals (Patient not taking: Reported on 5/6/2021) 60 tablet 5    fluticasone (FLONASE) 50 mcg/act nasal spray 1 spray into each nostril daily as needed for allergies (Patient not taking: Reported on 5/6/2021) 16 g 3    methocarbamol (ROBAXIN) 750 mg tablet Take 1 tablet (750 mg total) by mouth every 6 (six) hours as needed for muscle spasms (Patient not taking: Reported on 5/6/2021) 20 tablet 0     No current facility-administered medications for this visit  No Known Allergies    Review of Systems   Constitutional: Positive for fatigue  Negative for chills and fever  HENT: Negative for congestion and sore throat  Respiratory: Negative for cough, shortness of breath and wheezing  Gastrointestinal: Negative for diarrhea, nausea and vomiting  Musculoskeletal: Negative for arthralgias and myalgias  Psychiatric/Behavioral: Negative for confusion  Objective: There were no vitals filed for this visit  Physical Exam  Neurological:      Mental Status: She is alert  VIRTUAL VISIT DISCLAIMER    Harshil Sanderson acknowledges that she has consented to an online visit or consultation  She understands that the online visit is based solely on information provided by her, and that, in the absence of a face-to-face physical evaluation by the physician, the diagnosis she receives is both limited and provisional in terms of accuracy and completeness  This is not intended to replace a full medical face-to-face evaluation by the physician  Harshil Sanderson understands and accepts these terms

## 2021-06-09 ENCOUNTER — TELEPHONE (OUTPATIENT)
Dept: FAMILY MEDICINE CLINIC | Facility: CLINIC | Age: 56
End: 2021-06-09

## 2021-06-09 NOTE — TELEPHONE ENCOUNTER
Pt came in and wanted to schedule an appointment with you  she's due for a physical and wants to see before she goes on vacation  I really do not know where to put her

## 2021-06-10 NOTE — TELEPHONE ENCOUNTER
She wants to go in July but she wants to have an appointments with you first before she goes on any vacation

## 2021-06-23 ENCOUNTER — OFFICE VISIT (OUTPATIENT)
Dept: FAMILY MEDICINE CLINIC | Facility: CLINIC | Age: 56
End: 2021-06-23
Payer: COMMERCIAL

## 2021-06-23 VITALS
DIASTOLIC BLOOD PRESSURE: 90 MMHG | OXYGEN SATURATION: 97 % | WEIGHT: 146.8 LBS | RESPIRATION RATE: 12 BRPM | SYSTOLIC BLOOD PRESSURE: 120 MMHG | HEIGHT: 62 IN | HEART RATE: 109 BPM | BODY MASS INDEX: 27.02 KG/M2

## 2021-06-23 DIAGNOSIS — Z12.31 SCREENING MAMMOGRAM, ENCOUNTER FOR: ICD-10-CM

## 2021-06-23 DIAGNOSIS — Z12.4 ENCOUNTER FOR SCREENING FOR CERVICAL CANCER: ICD-10-CM

## 2021-06-23 DIAGNOSIS — R73.03 PRE-DIABETES: ICD-10-CM

## 2021-06-23 DIAGNOSIS — R31.9 HEMATURIA, UNSPECIFIED TYPE: ICD-10-CM

## 2021-06-23 DIAGNOSIS — H53.9 VISION CHANGES: ICD-10-CM

## 2021-06-23 DIAGNOSIS — Z00.00 ANNUAL PHYSICAL EXAM: Primary | ICD-10-CM

## 2021-06-23 DIAGNOSIS — D25.1 INTRAMURAL LEIOMYOMA OF UTERUS: ICD-10-CM

## 2021-06-23 LAB — SL AMB POCT HEMOGLOBIN AIC: 5.8 (ref ?–6.5)

## 2021-06-23 PROCEDURE — 83036 HEMOGLOBIN GLYCOSYLATED A1C: CPT | Performed by: INTERNAL MEDICINE

## 2021-06-23 PROCEDURE — 3008F BODY MASS INDEX DOCD: CPT | Performed by: INTERNAL MEDICINE

## 2021-06-23 PROCEDURE — 1036F TOBACCO NON-USER: CPT | Performed by: INTERNAL MEDICINE

## 2021-06-23 PROCEDURE — 99396 PREV VISIT EST AGE 40-64: CPT | Performed by: INTERNAL MEDICINE

## 2021-06-23 NOTE — PROGRESS NOTES
Assessment/Plan:    No problem-specific Assessment & Plan notes found for this encounter  Diagnoses and all orders for this visit:    Annual physical exam    Screening mammogram, encounter for  Comments:  Referral was given  Orders:  -     Mammo screening bilateral w 3d & cad; Future    BMI 26 0-26 9,adult    Encounter for screening for cervical cancer   -     Ambulatory referral to Gynecology; Future    Vision changes  -     Ambulatory referral to Ophthalmology; Future    Hematuria, unspecified type  Comments:  Persistent hematuria, will repeat UA  Orders:  -     UA (URINE) with reflex to Scope    Pre-diabetes  Comments:  Hemoglobin A1c was 5 9 today, she will modify her diet and start to exercise  Orders:  -     POCT hemoglobin A1c    Intramural leiomyoma of uterus  Comments:  She follows up with OBGYN, referral was given  BMI Counseling: Body mass index is 26 85 kg/m²  The BMI is above normal  Nutrition recommendations include encouraging healthy choices of fruits and vegetables  Exercise recommendations include moderate physical activity 150 minutes/week  Subjective:      Patient ID: Suzanne Rosario is a 55 y o  female  Patient came today for annual checkup  She does not report any active complaints except of superficial pain at her right lower chest, she said that she started to have it more after the COVID-19 infection  She does not report any chest pain, palpitation, shortness of breath, abdominal pain or urinary symptoms  She just had blood work done recently which was relatively normal   Hemoglobin A1c few months ago was slightly elevated up to 5 7  Her urine sample was showing mild pyuria and hematuria, she is currently asymptomatic  She is leaving to [facility] 4 months, she said that she will get her COVID-19 shot there  She is up-to-date on her tetanus shot  She had colonoscopy in 2019 with recommendation to repeat in 10 years    She is not up-to-date on her mammogram, she will try to do it today  She also was [facility] to [facility] and Ophthalmology  The following portions of the patient's history were reviewed and updated as appropriate: allergies, current medications, past family history, past medical history, past social history, past surgical history, and problem list     Review of Systems   Constitutional: Negative for activity change, appetite change, chills, fatigue and fever  HENT: Negative for congestion, ear pain, rhinorrhea and sore throat  Respiratory: Negative for cough, shortness of breath and wheezing  Cardiovascular: Negative for chest pain, palpitations and leg swelling  Gastrointestinal: Negative for abdominal distention, abdominal pain, diarrhea, nausea and vomiting  Genitourinary: Negative for difficulty urinating, frequency and pelvic pain  Musculoskeletal: Negative for arthralgias, back pain and neck pain  Skin: Negative for rash  Neurological: Negative for dizziness, tremors, weakness, numbness and headaches  Objective:      /90   Pulse (!) 109   Resp 12   Ht 5' 2" (1 575 m)   Wt 66 6 kg (146 lb 12 8 oz)   LMP 02/02/2020 (Approximate)   SpO2 97%   BMI 26 85 kg/m²          Physical Exam  Constitutional:       General: She is not in acute distress  Appearance: Normal appearance  She is not ill-appearing  HENT:      Nose: No rhinorrhea  Cardiovascular:      Rate and Rhythm: Normal rate and regular rhythm  Heart sounds: No murmur heard  No friction rub  No gallop  Pulmonary:      Effort: No respiratory distress  Breath sounds: No wheezing or rhonchi  Chest:      Chest wall: Tenderness (At the back of the chest on the right ) present  Abdominal:      General: There is no distension  Palpations: There is no mass  Tenderness: There is no abdominal tenderness  There is no guarding or rebound  Hernia: No hernia is present     Musculoskeletal:         General: No swelling or tenderness  Lymphadenopathy:      Cervical: No cervical adenopathy  Skin:     Coloration: Skin is not jaundiced  Findings: No rash  Neurological:      Mental Status: She is alert and oriented to person, place, and time  Motor: No weakness        Gait: Gait normal    Psychiatric:         Mood and Affect: Mood normal          Behavior: Behavior normal                Current Outpatient Medications:     acetaminophen (TYLENOL) 500 mg tablet, Take 1,000 mg by mouth every 8 (eight) hours as needed, Disp: , Rfl:

## 2021-06-24 ENCOUNTER — HOSPITAL ENCOUNTER (OUTPATIENT)
Dept: MAMMOGRAPHY | Facility: IMAGING CENTER | Age: 56
Discharge: HOME | End: 2021-06-24
Payer: COMMERCIAL

## 2021-06-24 VITALS — BODY MASS INDEX: 26.87 KG/M2 | HEIGHT: 62 IN | WEIGHT: 146 LBS

## 2021-06-24 DIAGNOSIS — Z12.31 SCREENING MAMMOGRAM, ENCOUNTER FOR: ICD-10-CM

## 2021-06-24 PROCEDURE — 77063 BREAST TOMOSYNTHESIS BI: CPT

## 2021-06-24 PROCEDURE — 77067 SCR MAMMO BI INCL CAD: CPT

## 2021-12-06 DIAGNOSIS — R09.89 CHEST CONGESTION: ICD-10-CM

## 2021-12-06 RX ORDER — GUAIFENESIN 600 MG
1200 TABLET, EXTENDED RELEASE 12 HR ORAL EVERY 12 HOURS SCHEDULED
Qty: 20 TABLET | Refills: 0 | Status: SHIPPED | OUTPATIENT
Start: 2021-12-06 | End: 2022-04-20

## 2022-03-11 NOTE — ED PROVIDER NOTES
History  Chief Complaint   Patient presents with    Vaginal Bleeding     OBGYN prescribed patient progesterone pills and iron for vaginal bleeding  increased vaginal bleeding reported tonight  52-year-old female presents to the emergency department with ongoing vaginal bleeding  She states she has been bleeding for over 10 days  She saw her OBGYN and was prescribed progesterone pills which she has been taking for about a week  She states she is prescribed these for 30 days  She says despite taking knees she continues to have heavy vaginal bleeding  She is uncertain how many pads she is going through a day  She states that she is passing clots  She is having lower abdominal cramping  No fevers or chills  No nausea or vomiting  No prior history of abnormal bleeding  History provided by:  Patient   used: No    Vaginal Bleeding   Quality:  Heavier than menses  Severity:  Unable to specify  Onset quality:  Gradual  Duration:  10 days  Timing:  Constant  Progression:  Worsening  Chronicity:  New  Menstrual history:  Irregular  Possible pregnancy: no    Context: spontaneously    Relieved by:  Nothing  Worsened by:  Nothing  Ineffective treatments:  Prescription medications  Associated symptoms: abdominal pain    Associated symptoms: no back pain, no dizziness, no fatigue, no fever, no nausea and no vaginal discharge    Risk factors: no bleeding disorder, no hx of endometriosis, no gynecological surgery and no prior miscarriage        None       History reviewed  No pertinent past medical history  History reviewed  No pertinent surgical history  History reviewed  No pertinent family history  I have reviewed and agree with the history as documented  Social History   Substance Use Topics    Smoking status: Never Smoker    Smokeless tobacco: Never Used    Alcohol use No        Review of Systems   Constitutional: Negative    Negative for chills, diaphoresis, fatigue and This RN has assumed care of pt. VSS on RA, pt denies any pain, DC instructions discussed, meds to bed completed, pt awaiting transport company   fever    HENT: Negative  Negative for congestion, rhinorrhea and sore throat  Eyes: Negative  Negative for discharge, redness and itching  Respiratory: Negative  Negative for apnea, cough, chest tightness, shortness of breath and wheezing  Cardiovascular: Negative for chest pain, palpitations and leg swelling  Gastrointestinal: Positive for abdominal pain  Negative for nausea  Endocrine: Negative  Genitourinary: Positive for vaginal bleeding  Negative for flank pain, frequency, urgency and vaginal discharge  Musculoskeletal: Negative  Negative for back pain  Skin: Negative  Allergic/Immunologic: Negative  Neurological: Negative  Negative for dizziness, syncope, weakness, light-headedness, numbness and headaches  Hematological: Negative  All other systems reviewed and are negative  Physical Exam  ED Triage Vitals [02/21/18 0329]   Temperature Pulse Respirations Blood Pressure SpO2   98 °F (36 7 °C) 84 20 103/56 100 %      Temp src Heart Rate Source Patient Position - Orthostatic VS BP Location FiO2 (%)   -- Monitor -- Right arm --      Pain Score       2           Orthostatic Vital Signs  Vitals:    02/21/18 0329   BP: 103/56   Pulse: 84       Physical Exam   Constitutional: She is oriented to person, place, and time  She appears well-developed and well-nourished  Non-toxic appearance  She does not have a sickly appearance  She does not appear ill  No distress  HENT:   Head: Normocephalic and atraumatic  Right Ear: External ear normal    Left Ear: External ear normal    Mouth/Throat: Oropharynx is clear and moist    Eyes: Conjunctivae are normal  Pupils are equal, round, and reactive to light  Right eye exhibits no discharge  Left eye exhibits no discharge  No scleral icterus  Cardiovascular: Normal rate, regular rhythm and normal heart sounds  Exam reveals no gallop and no friction rub  No murmur heard    Pulmonary/Chest: Effort normal and breath sounds normal  No respiratory distress  She has no wheezes  She has no rales  She exhibits no tenderness  Abdominal: Soft  Bowel sounds are normal  She exhibits no distension and no mass  There is no tenderness  No hernia  Neurological: She is alert and oriented to person, place, and time  She has normal reflexes  She exhibits normal muscle tone  Skin: Skin is warm and dry  No rash noted  She is not diaphoretic  No erythema  No pallor  Psychiatric: She has a normal mood and affect  Nursing note and vitals reviewed        ED Medications  Medications   ketorolac (TORADOL) injection 30 mg (30 mg Intravenous Given 2/21/18 0430)       Diagnostic Studies  Results Reviewed     Procedure Component Value Units Date/Time    Urine Microscopic [[id number]]  (Abnormal) Collected:  02/21/18 0415    Lab Status:  Final result Specimen:  Urine from Urine, Clean Catch Updated:  02/21/18 0432     RBC, UA Innumerable (A) /hpf      WBC, UA None Seen /hpf      Epithelial Cells Occasional /hpf      Bacteria, UA Occasional /hpf     UA w Reflex to Microscopic w Reflex to Culture [[id number]]  (Abnormal) Collected:  02/21/18 0415    Lab Status:  Final result Specimen:  Urine from Urine, Clean Catch Updated:  02/21/18 0431     Color, UA Red     Clarity, UA Cloudy     Specific Gravity, UA >=1 030     pH, UA 6 0     Leukocytes, UA Negative     Nitrite, UA Negative     Protein,  (2+) (A) mg/dl      Glucose, UA Negative mg/dl      Ketones, UA Negative mg/dl      Urobilinogen, UA 0 2 E U /dl      Bilirubin, UA Negative     Blood, UA Large (A)    CBC and differential [[id number]]  (Abnormal) Collected:  02/21/18 0420    Lab Status:  Final result Specimen:  Blood from Arm, Right Updated:  02/21/18 0426     WBC 12 13 (H) Thousand/uL      RBC 3 80 (L) Million/uL      Hemoglobin 9 3 (L) g/dL      Hematocrit 30 2 (L) %      MCV 80 (L) fL      MCH 24 5 (L) pg      MCHC 30 8 (L) g/dL      RDW 15 2 (H) %      MPV 10 1 fL      Platelets 201 (H) Thousands/uL nRBC 0 /100 WBCs      Neutrophils Relative 60 %      Lymphocytes Relative 29 %      Monocytes Relative 7 %      Eosinophils Relative 3 %      Basophils Relative 1 %      Neutrophils Absolute 7 38 Thousands/µL      Lymphocytes Absolute 3 51 Thousands/µL      Monocytes Absolute 0 79 Thousand/µL      Eosinophils Absolute 0 38 Thousand/µL      Basophils Absolute 0 07 Thousands/µL     POCT pregnancy, urine [[id number]]  (Normal) Resulted:  02/21/18 0417    Lab Status:  Final result Updated:  02/21/18 0417     EXT PREG TEST UR (Ref: Negative) negative                 No orders to display              Procedures  Procedures       Phone Contacts  ED Phone Contact    ED Course  ED Course as of Feb 21 0509 Wed Feb 21, 2018 0507 Patient states she is taking iron pills  She did get relief with Toradol  She was advised to follow up with her OBGYN this week  She is stable for discharge                                MDM  Number of Diagnoses or Management Options  Diagnosis management comments: 64-year-old female presents with heavy vaginal bleeding  She states this has been going on for the last 10 days  She is taking progesterone pills without relief  She complains of lower abdominal cramping  She states she is meredith menopausal   On exam she is awake and alert and in no distress  She has no abdominal tenderness  Will check CBC to rule out significant anemia  Will rule out pregnancy  Will give Toradol for cramping  Patient will most likely be stable for discharge and follow up with her OBGYN this week         Amount and/or Complexity of Data Reviewed  Clinical lab tests: ordered and reviewed  Independent visualization of images, tracings, or specimens: yes      CritCare Time    Disposition  Final diagnoses:   Abnormal vaginal bleeding     Time reflects when diagnosis was documented in both MDM as applicable and the Disposition within this note     Time User Action Codes Description Comment    2/21/2018  5:08 AM Sarah Valdivia Add [N93 9] Abnormal vaginal bleeding       ED Disposition     ED Disposition Condition Comment    Discharge  Harshil Curet discharge to home/self care  Condition at discharge: Good        Follow-up Information     Follow up With Specialties Details Why Contact Info       Follow-up with your OBGYN this week         Patient's Medications   Discharge Prescriptions    IBUPROFEN (MOTRIN) 600 MG TABLET    Take 1 tablet (600 mg total) by mouth every 6 (six) hours as needed for mild pain or moderate pain       Start Date: 2/21/2018 End Date: --       Order Dose: 600 mg       Quantity: 15 tablet    Refills: 0     No discharge procedures on file      ED Provider  Electronically Signed by           Shalini Caban DO  02/21/18 43

## 2022-03-14 ENCOUNTER — HOSPITAL ENCOUNTER (EMERGENCY)
Facility: HOSPITAL | Age: 57
Discharge: HOME | End: 2022-03-14
Attending: EMERGENCY MEDICINE | Admitting: EMERGENCY MEDICINE
Payer: COMMERCIAL

## 2022-03-14 VITALS
TEMPERATURE: 98.1 F | BODY MASS INDEX: 27.58 KG/M2 | HEART RATE: 89 BPM | SYSTOLIC BLOOD PRESSURE: 139 MMHG | WEIGHT: 150.8 LBS | DIASTOLIC BLOOD PRESSURE: 93 MMHG | OXYGEN SATURATION: 98 % | RESPIRATION RATE: 18 BRPM

## 2022-03-14 DIAGNOSIS — M54.50 ACUTE LEFT-SIDED LOW BACK PAIN WITHOUT SCIATICA: Primary | ICD-10-CM

## 2022-03-14 PROCEDURE — 99283 EMERGENCY DEPT VISIT LOW MDM: CPT

## 2022-03-14 PROCEDURE — 99284 EMERGENCY DEPT VISIT MOD MDM: CPT | Performed by: PHYSICIAN ASSISTANT

## 2022-03-14 PROCEDURE — 96372 THER/PROPH/DIAG INJ SC/IM: CPT

## 2022-03-14 RX ORDER — NAPROXEN 500 MG/1
500 TABLET ORAL 2 TIMES DAILY WITH MEALS
Qty: 30 TABLET | Refills: 0 | Status: SHIPPED | OUTPATIENT
Start: 2022-03-14 | End: 2022-04-20

## 2022-03-14 RX ORDER — KETOROLAC TROMETHAMINE 30 MG/ML
30 INJECTION, SOLUTION INTRAMUSCULAR; INTRAVENOUS ONCE
Status: COMPLETED | OUTPATIENT
Start: 2022-03-14 | End: 2022-03-14

## 2022-03-14 RX ORDER — ACETAMINOPHEN 500 MG
500 TABLET ORAL EVERY 6 HOURS PRN
Qty: 30 TABLET | Refills: 0 | Status: SHIPPED | OUTPATIENT
Start: 2022-03-14 | End: 2022-04-20

## 2022-03-14 RX ORDER — LIDOCAINE 50 MG/G
1 PATCH TOPICAL DAILY
Qty: 6 PATCH | Refills: 0 | Status: SHIPPED | OUTPATIENT
Start: 2022-03-14 | End: 2022-04-20

## 2022-03-14 RX ORDER — LIDOCAINE 50 MG/G
1 PATCH TOPICAL ONCE
Status: DISCONTINUED | OUTPATIENT
Start: 2022-03-14 | End: 2022-03-14 | Stop reason: HOSPADM

## 2022-03-14 RX ADMIN — LIDOCAINE 1 PATCH: 50 PATCH TOPICAL at 06:37

## 2022-03-14 RX ADMIN — KETOROLAC TROMETHAMINE 30 MG: 30 INJECTION, SOLUTION INTRAMUSCULAR; INTRAVENOUS at 06:37

## 2022-03-14 NOTE — Clinical Note
Harshil Sanderson was seen and treated in our emergency department on 3/14/2022  No restrictions            Diagnosis:     Harshil  may return to work on return date  She may return on this date: 03/15/2022         If you have any questions or concerns, please don't hesitate to call        Whitney Hernandez PA-C    ______________________________           _______________          _______________  Hospital Representative                              Date                                Time

## 2022-03-14 NOTE — ED PROVIDER NOTES
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 Taking?   acetaminophen (TYLENOL) 500 mg tablet  Self Yes No   Sig: Take 1,000 mg by mouth every 8 (eight) hours as needed   fluticasone (FLONASE) 50 mcg/act nasal spray   No No   Si spray into each nostril daily   guaiFENesin (MUCINEX) 600 mg 12 hr tablet   No No   Sig: Take 2 tablets (1,200 mg total) by mouth every 12 (twelve) hours      Facility-Administered Medications: None       Past Medical History:   Diagnosis Date    Allergic     Anemia     Disease of thyroid gland     enlarged thyroid    Intraductal hyperplasia without atypia of right breast 2016    c florid and sclerosing adenosis, background dense stromal fibrosis    Iron deficiency anemia due to chronic blood loss     transfused : ferritin 2   H/H 8  7%    Menorrhagia with regular cycle     Varicella     without complication       Past Surgical History:   Procedure Laterality Date    BREAST BIOPSY Right 2016    intraductal hyperplasia without atypia,fibroid and sclerosing adenosis, and columnar cell change in a bsckround of dense stromal fibrosis no malignancy identified    TUBAL LIGATION      US GUIDED BREAST BIOPSY RIGHT COMPLETE Right 2/19/2016    UTERINE FIBROID SURGERY         Family History   Problem Relation Age of Onset    Breast cancer Mother         52's passed 78 yo c mets    Anemia Mother     Thyroid disease Mother         total thyroidectomy uncertain reason    Cancer Mother     Hyperlipidemia Father     No Known Problems Brother     ADD / ADHD Son     No Known Problems Daughter     No Known Problems Maternal Grandmother     No Known Problems Maternal Grandfather     No Known Problems Paternal Grandmother     No Known Problems Paternal Grandfather     No Known Problems Daughter     No Known Problems Paternal Aunt     No Known Problems Paternal Aunt     No Known Problems Paternal Aunt     No Known Problems Paternal Aunt     No Known Problems Paternal Aunt     Deep vein thrombosis Neg Hx     Mental illness Neg Hx     Hypertension Neg Hx     Coronary artery disease Neg Hx     Diabetes type II Neg Hx      I have reviewed and agree with the history as documented  E-Cigarette/Vaping    E-Cigarette Use Never User      E-Cigarette/Vaping Substances     Social History     Tobacco Use    Smoking status: Never Smoker    Smokeless tobacco: Never Used   Vaping Use    Vaping Use: Never used   Substance Use Topics    Alcohol use: Not Currently     Comment: rare    Drug use: No       Review of Systems   Constitutional: Negative  Negative for chills and fatigue  HENT: Negative for ear pain and sore throat  Eyes: Negative for photophobia and redness  Respiratory: Negative for apnea, cough and shortness of breath  Cardiovascular: Negative for chest pain  Gastrointestinal: Negative for abdominal pain, nausea and vomiting  Genitourinary: Negative for dysuria  Musculoskeletal: Positive for back pain  Negative for arthralgias, neck pain and neck stiffness  Skin: Negative for rash     Neurological: Negative for dizziness, tremors, syncope and weakness  Psychiatric/Behavioral: Negative for suicidal ideas  Physical Exam  Physical Exam  Constitutional:       General: She is not in acute distress  Appearance: She is well-developed  She is not diaphoretic  Eyes:      Pupils: Pupils are equal, round, and reactive to light  Cardiovascular:      Rate and Rhythm: Normal rate and regular rhythm  Pulmonary:      Effort: Pulmonary effort is normal  No respiratory distress  Breath sounds: Normal breath sounds  Abdominal:      General: Bowel sounds are normal  There is no distension  Palpations: Abdomen is soft  Musculoskeletal:         General: Normal range of motion  Cervical back: Normal range of motion and neck supple  Comments: No posterior midline tenderness vertebral abnormality or step-off  Mild tenderness appreciated on left paraspinous area and left lumbar  Negative straight leg raise bilaterally  Able to ambulate  Sensation 6/6 in bilateral lower extremities  Strength 5/5 in bilateral lower extremities  Dorsalis pedis pulse 2 +bilaterally  Skin:     General: Skin is warm and dry  Neurological:      Mental Status: She is alert and oriented to person, place, and time           Vital Signs  ED Triage Vitals [03/14/22 0625]   Temperature Pulse Respirations Blood Pressure SpO2   98 1 °F (36 7 °C) 89 18 139/93 98 %      Temp Source Heart Rate Source Patient Position - Orthostatic VS BP Location FiO2 (%)   Oral Monitor Sitting Right arm --      Pain Score       10 - Worst Possible Pain           Vitals:    03/14/22 0625   BP: 139/93   Pulse: 89   Patient Position - Orthostatic VS: Sitting         Visual Acuity      ED Medications  Medications   lidocaine (LIDODERM) 5 % patch 1 patch (1 patch Topical Medication Applied 3/14/22 0637)   ketorolac (TORADOL) injection 30 mg (30 mg Intramuscular Given 3/14/22 0228)       Diagnostic Studies  Results Reviewed     None                 No orders to display Procedures  Procedures         ED Course                                             MDM  Number of Diagnoses or Management Options  Diagnosis management comments: Patient had mild relief with medications given emergency department  No red flag symptoms for cauda equina  No acute need for imaging at this time  Patient was educated on supportive care given follow-up with spine center demonstrates understanding of return precautions in stable for discharge home  Ambulated out of the department  Risk of Complications, Morbidity, and/or Mortality  Presenting problems: moderate  Diagnostic procedures: moderate  Management options: moderate    Patient Progress  Patient progress: stable      Disposition  Final diagnoses:   Acute left-sided low back pain without sciatica     Time reflects when diagnosis was documented in both MDM as applicable and the Disposition within this note     Time User Action Codes Description Comment    3/14/2022  6:38 AM Heidi Maldonado Add [M54 50] Acute left-sided low back pain without sciatica       ED Disposition     ED Disposition Condition Date/Time Comment    Discharge Stable Mon Mar 14, 2022  6:38 AM Harshil Sanderson discharge to home/self care              Follow-up Information     Follow up With Specialties Details Why Contact Info Additional Information    Nicolette Chavez PA-C Family Medicine, Physician Assistant Call  As needed [address] [address] [phone]       [facility] Physical Therapy Go in 3 days As needed, If symptoms worsen [phone]          Discharge Medication List as of 3/14/2022  6:41 AM      START taking these medications    Details   !! acetaminophen (TYLENOL) 500 mg tablet Take 1 tablet (500 mg total) by mouth every 6 (six) hours as needed for moderate pain, Starting Mon 3/14/2022, Normal      lidocaine (Lidoderm) 5 % Apply 1 patch topically daily Remove & Discard patch within 12 hours or as directed by MD, Starting Mon 3/14/2022, Normal      naproxen (Naprosyn) 500 mg tablet Take 1 tablet (500 mg total) by mouth 2 (two) times a day with meals, Starting Mon 3/14/2022, Normal       !! - Potential duplicate medications found  Please discuss with provider  CONTINUE these medications which have NOT CHANGED    Details   !! acetaminophen (TYLENOL) 500 mg tablet Take 1,000 mg by mouth every 8 (eight) hours as needed, Starting Tue 8/13/2019, Historical Med      fluticasone (FLONASE) 50 mcg/act nasal spray 1 spray into each nostril daily, Starting Sat 12/4/2021, Normal      guaiFENesin (MUCINEX) 600 mg 12 hr tablet Take 2 tablets (1,200 mg total) by mouth every 12 (twelve) hours, Starting Mon 12/6/2021, Normal       !! - Potential duplicate medications found  Please discuss with provider  No discharge procedures on file      PDMP Review     None          ED Provider  Electronically Signed by           Bala Harris PA-C  03/14/22 5317

## 2022-03-14 NOTE — DISCHARGE INSTRUCTIONS
Take medications as directed  Return for worsening complaints follow-up with your primary care doctor versus [address] if symptoms continue

## 2022-04-13 ENCOUNTER — APPOINTMENT (EMERGENCY)
Dept: RADIOLOGY | Facility: HOSPITAL | Age: 57
End: 2022-04-13
Payer: COMMERCIAL

## 2022-04-13 ENCOUNTER — TELEPHONE (OUTPATIENT)
Dept: FAMILY MEDICINE CLINIC | Facility: CLINIC | Age: 57
End: 2022-04-13

## 2022-04-13 ENCOUNTER — HOSPITAL ENCOUNTER (EMERGENCY)
Facility: HOSPITAL | Age: 57
Discharge: HOME | End: 2022-04-13
Attending: EMERGENCY MEDICINE
Payer: COMMERCIAL

## 2022-04-13 VITALS
WEIGHT: 151.1 LBS | SYSTOLIC BLOOD PRESSURE: 133 MMHG | HEART RATE: 88 BPM | DIASTOLIC BLOOD PRESSURE: 89 MMHG | TEMPERATURE: 98.4 F | OXYGEN SATURATION: 99 % | BODY MASS INDEX: 27.64 KG/M2 | RESPIRATION RATE: 18 BRPM

## 2022-04-13 DIAGNOSIS — R06.00 DYSPNEA, UNSPECIFIED TYPE: Primary | ICD-10-CM

## 2022-04-13 LAB
ALBUMIN SERPL BCP-MCNC: 4.3 G/DL (ref 3–5.2)
ALP SERPL-CCNC: 155 U/L (ref 43–122)
ALT SERPL W P-5'-P-CCNC: 27 U/L
ANION GAP SERPL CALCULATED.3IONS-SCNC: 5 MMOL/L (ref 5–14)
AST SERPL W P-5'-P-CCNC: 31 U/L (ref 14–36)
ATRIAL RATE: 85 BPM
BASOPHILS # BLD AUTO: 0.08 THOUSANDS/ΜL (ref 0–0.1)
BASOPHILS NFR BLD AUTO: 1 % (ref 0–1)
BILIRUB SERPL-MCNC: 0.48 MG/DL
BUN SERPL-MCNC: 10 MG/DL (ref 5–25)
CALCIUM SERPL-MCNC: 10.2 MG/DL (ref 8.4–10.2)
CARDIAC TROPONIN I PNL SERPL HS: 2 NG/L
CHLORIDE SERPL-SCNC: 108 MMOL/L (ref 97–108)
CO2 SERPL-SCNC: 27 MMOL/L (ref 22–30)
CREAT SERPL-MCNC: 0.7 MG/DL (ref 0.6–1.2)
D DIMER PPP FEU-MCNC: <0.27 UG/ML FEU
EOSINOPHIL # BLD AUTO: 0.23 THOUSAND/ΜL (ref 0–0.61)
EOSINOPHIL NFR BLD AUTO: 3 % (ref 0–6)
ERYTHROCYTE [DISTWIDTH] IN BLOOD BY AUTOMATED COUNT: 13.3 % (ref 11.6–15.1)
GFR SERPL CREATININE-BSD FRML MDRD: 97 ML/MIN/1.73SQ M
GLUCOSE SERPL-MCNC: 100 MG/DL (ref 70–99)
HCT VFR BLD AUTO: 45.3 % (ref 34.8–46.1)
HGB BLD-MCNC: 14.6 G/DL (ref 11.5–15.4)
IMM GRANULOCYTES # BLD AUTO: 0.02 THOUSAND/UL (ref 0–0.2)
IMM GRANULOCYTES NFR BLD AUTO: 0 % (ref 0–2)
LYMPHOCYTES # BLD AUTO: 3.52 THOUSANDS/ΜL (ref 0.6–4.47)
LYMPHOCYTES NFR BLD AUTO: 40 % (ref 14–44)
MCH RBC QN AUTO: 28.3 PG (ref 26.8–34.3)
MCHC RBC AUTO-ENTMCNC: 32.2 G/DL (ref 31.4–37.4)
MCV RBC AUTO: 88 FL (ref 82–98)
MONOCYTES # BLD AUTO: 0.55 THOUSAND/ΜL (ref 0.17–1.22)
MONOCYTES NFR BLD AUTO: 6 % (ref 4–12)
NEUTROPHILS # BLD AUTO: 4.52 THOUSANDS/ΜL (ref 1.85–7.62)
NEUTS SEG NFR BLD AUTO: 50 % (ref 43–75)
NRBC BLD AUTO-RTO: 0 /100 WBCS
NT-PROBNP SERPL-MCNC: 20.5 PG/ML (ref 0–299)
P AXIS: 52 DEGREES
PLATELET # BLD AUTO: 320 THOUSANDS/UL (ref 149–390)
PMV BLD AUTO: 10.8 FL (ref 8.9–12.7)
POTASSIUM SERPL-SCNC: 3.9 MMOL/L (ref 3.6–5)
PR INTERVAL: 134 MS
PROT SERPL-MCNC: 8.5 G/DL (ref 5.9–8.4)
QRS AXIS: 32 DEGREES
QRSD INTERVAL: 78 MS
QT INTERVAL: 368 MS
QTC INTERVAL: 437 MS
RBC # BLD AUTO: 5.16 MILLION/UL (ref 3.81–5.12)
SODIUM SERPL-SCNC: 140 MMOL/L (ref 137–147)
T WAVE AXIS: 42 DEGREES
VENTRICULAR RATE: 85 BPM
WBC # BLD AUTO: 8.92 THOUSAND/UL (ref 4.31–10.16)

## 2022-04-13 PROCEDURE — 83880 ASSAY OF NATRIURETIC PEPTIDE: CPT | Performed by: EMERGENCY MEDICINE

## 2022-04-13 PROCEDURE — 84484 ASSAY OF TROPONIN QUANT: CPT | Performed by: EMERGENCY MEDICINE

## 2022-04-13 PROCEDURE — 36415 COLL VENOUS BLD VENIPUNCTURE: CPT | Performed by: EMERGENCY MEDICINE

## 2022-04-13 PROCEDURE — 93005 ELECTROCARDIOGRAM TRACING: CPT

## 2022-04-13 PROCEDURE — 99285 EMERGENCY DEPT VISIT HI MDM: CPT | Performed by: EMERGENCY MEDICINE

## 2022-04-13 PROCEDURE — 80053 COMPREHEN METABOLIC PANEL: CPT | Performed by: EMERGENCY MEDICINE

## 2022-04-13 PROCEDURE — 99285 EMERGENCY DEPT VISIT HI MDM: CPT

## 2022-04-13 PROCEDURE — 93010 ELECTROCARDIOGRAM REPORT: CPT

## 2022-04-13 PROCEDURE — 85025 COMPLETE CBC W/AUTO DIFF WBC: CPT | Performed by: EMERGENCY MEDICINE

## 2022-04-13 PROCEDURE — 85379 FIBRIN DEGRADATION QUANT: CPT | Performed by: EMERGENCY MEDICINE

## 2022-04-13 PROCEDURE — 71045 X-RAY EXAM CHEST 1 VIEW: CPT

## 2022-04-13 PROCEDURE — 96360 HYDRATION IV INFUSION INIT: CPT

## 2022-04-13 RX ADMIN — SODIUM CHLORIDE 1000 ML: 0.9 INJECTION, SOLUTION INTRAVENOUS at 07:57

## 2022-04-13 NOTE — TELEPHONE ENCOUNTER
Pt called stating she was in the ER and would like to speak with you   If you could give her a call at [phone]

## 2022-04-13 NOTE — ED PROVIDER NOTES
History  Chief Complaint   Patient presents with    Shortness of Breath     started this morning    Tingling     in shoulders, back and tongue        History provided by:  Patient  Shortness of Breath  Severity:  Mild  Onset quality:  Gradual  Timing:  Intermittent  Progression:  Waxing and waning  Chronicity:  New  Context: smoke exposure    Relieved by:  Nothing  Worsened by:  Nothing  Ineffective treatments:  None tried  Associated symptoms: chest pain    Associated symptoms: no abdominal pain, no cough, no fever, no headaches, no rash, no sore throat and no wheezing        Prior to Admission Medications   Prescriptions Last Dose Informant Patient Reported?  Taking?   acetaminophen (TYLENOL) 500 mg tablet Not Taking at Unknown time Self Yes No   Sig: Take 1,000 mg by mouth every 8 (eight) hours as needed   Patient not taking: Reported on 2022    acetaminophen (TYLENOL) 500 mg tablet Not Taking at Unknown time  No No   Sig: Take 1 tablet (500 mg total) by mouth every 6 (six) hours as needed for moderate pain   Patient not taking: Reported on 2022    fluticasone (FLONASE) 50 mcg/act nasal spray Not Taking at Unknown time  No No   Si spray into each nostril daily   Patient not taking: Reported on 2022    guaiFENesin (MUCINEX) 600 mg 12 hr tablet Not Taking at Unknown time  No No   Sig: Take 2 tablets (1,200 mg total) by mouth every 12 (twelve) hours   Patient not taking: Reported on 2022    lidocaine (Lidoderm) 5 % Not Taking at Unknown time  No No   Sig: Apply 1 patch topically daily Remove & Discard patch within 12 hours or as directed by MD   Patient not taking: Reported on 2022    naproxen (Naprosyn) 500 mg tablet Not Taking at Unknown time  No No   Sig: Take 1 tablet (500 mg total) by mouth 2 (two) times a day with meals   Patient not taking: Reported on 2022       Facility-Administered Medications: None       Past Medical History:   Diagnosis Date    Allergic     Anemia  Disease of thyroid gland     enlarged thyroid    Intraductal hyperplasia without atypia of right breast 02/2016    c florid and sclerosing adenosis, background dense stromal fibrosis    Iron deficiency anemia due to chronic blood loss     transfused 2015 9/17: ferritin 2  H/H 8 8/31 7%    Menorrhagia with regular cycle     Varicella     without complication       Past Surgical History:   Procedure Laterality Date    BREAST BIOPSY Right 02/2016    intraductal hyperplasia without atypia,fibroid and sclerosing adenosis, and columnar cell change in a bsckround of dense stromal fibrosis no malignancy identified    TUBAL LIGATION      US GUIDED BREAST BIOPSY RIGHT COMPLETE Right 2/19/2016    UTERINE FIBROID SURGERY         Family History   Problem Relation Age of Onset    Breast cancer Mother         52's passed 78 yo c mets    Anemia Mother     Thyroid disease Mother         total thyroidectomy uncertain reason    Cancer Mother     Hyperlipidemia Father     No Known Problems Brother     ADD / ADHD Son     No Known Problems Daughter     No Known Problems Maternal Grandmother     No Known Problems Maternal Grandfather     No Known Problems Paternal Grandmother     No Known Problems Paternal Grandfather     No Known Problems Daughter     No Known Problems Paternal Aunt     No Known Problems Paternal Aunt     No Known Problems Paternal Aunt     No Known Problems Paternal Aunt     No Known Problems Paternal Aunt     Deep vein thrombosis Neg Hx     Mental illness Neg Hx     Hypertension Neg Hx     Coronary artery disease Neg Hx     Diabetes type II Neg Hx      I have reviewed and agree with the history as documented      E-Cigarette/Vaping    E-Cigarette Use Never User      E-Cigarette/Vaping Substances     Social History     Tobacco Use    Smoking status: Never Smoker    Smokeless tobacco: Never Used   Vaping Use    Vaping Use: Never used   Substance Use Topics    Alcohol use: Not Currently     Comment: rare    Drug use: No       Review of Systems   Constitutional: Negative for chills and fever  HENT: Negative for rhinorrhea, sore throat and trouble swallowing  Eyes: Negative for pain  Respiratory: Positive for shortness of breath  Negative for cough, wheezing and stridor  Cardiovascular: Positive for chest pain  Negative for leg swelling  Gastrointestinal: Negative for abdominal pain, diarrhea and nausea  Endocrine: Negative for polyuria  Genitourinary: Negative for dysuria, flank pain and urgency  Musculoskeletal: Negative for joint swelling, myalgias and neck stiffness  Skin: Negative for rash  Allergic/Immunologic: Negative for immunocompromised state  Neurological: Negative for dizziness, syncope, weakness, numbness and headaches  Psychiatric/Behavioral: Negative for confusion and suicidal ideas  All other systems reviewed and are negative  Physical Exam  Physical Exam  Vitals and nursing note reviewed  Constitutional:       Appearance: Normal appearance  She is well-developed  HENT:      Head: Normocephalic and atraumatic  Nose: Nose normal       Mouth/Throat:      Mouth: Mucous membranes are moist    Eyes:      Extraocular Movements: Extraocular movements intact  Pupils: Pupils are equal, round, and reactive to light  Cardiovascular:      Rate and Rhythm: Normal rate and regular rhythm  Heart sounds: No murmur heard  No friction rub  Pulmonary:      Effort: No respiratory distress  Breath sounds: Normal breath sounds  No wheezing or rales  Abdominal:      General: Bowel sounds are normal  There is no distension  Palpations: Abdomen is soft  Tenderness: There is no abdominal tenderness  Musculoskeletal:         General: No tenderness  Normal range of motion  Cervical back: Normal range of motion and neck supple  Skin:     General: Skin is warm  Findings: No rash     Neurological:      Mental Status: She is alert and oriented to person, place, and time  Psychiatric:         Mood and Affect: Mood normal          Vital Signs  ED Triage Vitals [04/13/22 0744]   Temperature Pulse Respirations Blood Pressure SpO2   98 4 °F (36 9 °C) 88 18 133/89 99 %      Temp Source Heart Rate Source Patient Position - Orthostatic VS BP Location FiO2 (%)   Oral -- Sitting Left arm --      Pain Score       --           Vitals:    04/13/22 0744   BP: 133/89   Pulse: 88   Patient Position - Orthostatic VS: Sitting         Visual Acuity      ED Medications  Medications   sodium chloride 0 9 % bolus 1,000 mL (0 mL Intravenous Stopped 4/13/22 0908)       Diagnostic Studies  Results Reviewed     Procedure Component Value Units Date/Time    D-Dimer [[id number]]  (Normal) Collected: 04/13/22 0757    Lab Status: Final result Specimen: Blood from Arm, Right Updated: 04/13/22 0851     D-Dimer, Quant <0 27 ug/ml FEU     Narrative: In the evaluation for possible pulmonary embolism, in the appropriate (Well's Score of 4 or less) patient, the age adjusted d-dimer cutoff for this patient can be calculated as:    Age x 0 01 (in ug/mL) for Age-adjusted D-dimer exclusion threshold for a patient over 50 years      NT-BNP PRO [[id number]]  (Normal) Collected: 04/13/22 0757    Lab Status: Final result Specimen: Blood from Arm, Right Updated: 04/13/22 0851     NT-proBNP 20 5 pg/mL     HS Troponin 0hr (reflex protocol) [[id number]]  (Normal) Collected: 04/13/22 0757    Lab Status: Final result Specimen: Blood from Arm, Right Updated: 04/13/22 0850     hs TnI 0hr 2 ng/L     HS Troponin I 2hr [[id number]]     Lab Status: No result Specimen: Blood     HS Troponin I 4hr [[id number]]     Lab Status: No result Specimen: Blood     Comprehensive metabolic panel [[id number]]  (Abnormal) Collected: 04/13/22 0757    Lab Status: Final result Specimen: Blood from Arm, Right Updated: 04/13/22 0840     Sodium 140 mmol/L      Potassium 3 9 mmol/L      Chloride 108 mmol/L CO2 27 mmol/L      ANION GAP 5 mmol/L      BUN 10 mg/dL      Creatinine 0 70 mg/dL      Glucose 100 mg/dL      Calcium 10 2 mg/dL      AST 31 U/L      ALT 27 U/L      Alkaline Phosphatase 155 U/L      Total Protein 8 5 g/dL      Albumin 4 3 g/dL      Total Bilirubin 0 48 mg/dL      eGFR 97 ml/min/1 73sq m     Narrative:      National Kidney Disease Foundation guidelines for Chronic Kidney Disease (CKD):     Stage 1 with normal or high GFR (GFR > 90 mL/min/1 73 square meters)    Stage 2 Mild CKD (GFR = 60-89 mL/min/1 73 square meters)    Stage 3A Moderate CKD (GFR = 45-59 mL/min/1 73 square meters)    Stage 3B Moderate CKD (GFR = 30-44 mL/min/1 73 square meters)    Stage 4 Severe CKD (GFR = 15-29 mL/min/1 73 square meters)    Stage 5 End Stage CKD (GFR <15 mL/min/1 73 square meters)  Note: GFR calculation is accurate only with a steady state creatinine    CBC and differential [[id number]]  (Abnormal) Collected: 04/13/22 0757    Lab Status: Final result Specimen: Blood from Arm, Right Updated: 04/13/22 0827     WBC 8 92 Thousand/uL      RBC 5 16 Million/uL      Hemoglobin 14 6 g/dL      Hematocrit 45 3 %      MCV 88 fL      MCH 28 3 pg      MCHC 32 2 g/dL      RDW 13 3 %      MPV 10 8 fL      Platelets 358 Thousands/uL      nRBC 0 /100 WBCs      Neutrophils Relative 50 %      Immat GRANS % 0 %      Lymphocytes Relative 40 %      Monocytes Relative 6 %      Eosinophils Relative 3 %      Basophils Relative 1 %      Neutrophils Absolute 4 52 Thousands/µL      Immature Grans Absolute 0 02 Thousand/uL      Lymphocytes Absolute 3 52 Thousands/µL      Monocytes Absolute 0 55 Thousand/µL      Eosinophils Absolute 0 23 Thousand/µL      Basophils Absolute 0 08 Thousands/µL                  XR chest 1 view portable   Final Result by Sara Tobias MD (04/13 1172)      No acute cardiopulmonary disease                    Workstation performed: [id number]                    Procedures  Procedures         ED Course  ED Course as of 04/13/22 0948   Wed Apr 13, 2022   1941 Labs unremarkable D-dimer negative troponin negative at this point time  We discussed with the patient plan with outpatient management followup given strict instructions when to return back to the emergency department EKG is unremarkable as well  5503 Pt re-examined and evaluated after testing and treatment  Spoke with the patient and feeling improved and sxs have resolved  Will discharge home with close f/u with pcp and instructed to return to the ED if sxs worsen or continue  Pt agrees with the plan for discharge and feels comfortable to go home with proper f/u  Advised to return for worsening or additional problems  Diagnostic tests were reviewed and questions answered  Diagnosis, care plan and treatment options were discussed  The patient understand instructions and will follow up as directed  Counseling: I had a detailed discussion with the patient and/or guardian regarding: the historical points, exam findings, and any diagnostic results supporting the discharge diagnosis, lab results, radiology results, discharge instructions reviewed with patient and/or family/caregiver and understanding was verbalized  Instructions given to return to the emergency department if symptoms worsen or persist, or if there are any questions or concerns that arise at home  All labs reviewed and utilized in the medical decision making process    All radiology studies independently viewed by me and interpreted by the radiologist                                SBIRT 20yo+      Most Recent Value   SBIRT (23 yo +)    In order to provide better care to our patients, we are screening all of our patients for alcohol and drug use  Would it be okay to ask you these screening questions?  No Filed at: 04/13/2022 0809                    MDM  Number of Diagnoses or Management Options  Dyspnea, unspecified type: new and requires workup  Diagnosis management comments: Background: 65 y o  female with chest pain    Differential DX includes but is not limited to: acs/mi, pe, pleurisy, dissection, pneumonia, musculoskeletal chest pain    Plan: cardiac workup    Pt re-examined and evaluated after testing and treatment  Spoke with the patient and feeling improved and sxs have resolved  Will discharge home with close f/u with pcp and instructed to return to the ED if sxs worsen or continue  Pt agrees with the plan for discharge and feels comfortable to go home with proper f/u  Advised to return for worsening or additional problems  Diagnostic tests were reviewed and questions answered  Diagnosis, care plan and treatment options were discussed  The patient understand instructions and will follow up as directed  Counseling: I had a detailed discussion with the patient and/or guardian regarding: the historical points, exam findings, and any diagnostic results supporting the discharge diagnosis, lab results, radiology results, discharge instructions reviewed with patient and/or family/caregiver and understanding was verbalized  Instructions given to return to the emergency department if symptoms worsen or persist, or if there are any questions or concerns that arise at home       All labs reviewed and utilized in the medical decision making process    All radiology studies independently viewed by me and interpreted by the radiologist            Amount and/or Complexity of Data Reviewed  Clinical lab tests: ordered and reviewed  Tests in the radiology section of CPT®: ordered and reviewed  Review and summarize past medical records: yes  Independent visualization of images, tracings, or specimens: yes        Disposition  Final diagnoses:   Dyspnea, unspecified type     Time reflects when diagnosis was documented in both MDM as applicable and the Disposition within this note     Time User Action Codes Description Comment    4/13/2022  8:53 AM Geremias Pineda Add [R06 00] Dyspnea, unspecified type       ED Disposition     ED Disposition Condition Date/Time Comment    Discharge Stable Wed Apr 13, 2022 9939 Dyana Morning Curet discharge to home/self care  Follow-up Information    None         Discharge Medication List as of 4/13/2022  8:53 AM      CONTINUE these medications which have NOT CHANGED    Details   !! acetaminophen (TYLENOL) 500 mg tablet Take 1,000 mg by mouth every 8 (eight) hours as needed, Starting Tue 8/13/2019, Historical Med      !! acetaminophen (TYLENOL) 500 mg tablet Take 1 tablet (500 mg total) by mouth every 6 (six) hours as needed for moderate pain, Starting Mon 3/14/2022, Normal      fluticasone (FLONASE) 50 mcg/act nasal spray 1 spray into each nostril daily, Starting Sat 12/4/2021, Normal      guaiFENesin (MUCINEX) 600 mg 12 hr tablet Take 2 tablets (1,200 mg total) by mouth every 12 (twelve) hours, Starting Mon 12/6/2021, Normal      lidocaine (Lidoderm) 5 % Apply 1 patch topically daily Remove & Discard patch within 12 hours or as directed by MD, Starting Mon 3/14/2022, Normal      naproxen (Naprosyn) 500 mg tablet Take 1 tablet (500 mg total) by mouth 2 (two) times a day with meals, Starting Mon 3/14/2022, Normal       !! - Potential duplicate medications found  Please discuss with provider  No discharge procedures on file      PDMP Review     None          ED Provider  Electronically Signed by           Son Gaines DO  04/13/22 9198

## 2022-04-13 NOTE — TELEPHONE ENCOUNTER
Spoke with patient  Patient is scheduled for a TCM on 4/25/22 and patient states there is no need to discuss concerns at this time  Nothing urgent  Will wait until appt

## 2022-04-13 NOTE — ED PROCEDURE NOTE
PROCEDURE  ECG 12 Lead Documentation Only    Date/Time: 4/13/2022 8:12 AM  Performed by: Kiki Ferrari DO  Authorized by: Kiki Ferrari DO     ECG reviewed by me, the ED Provider: yes    Patient location:  ED  Previous ECG:     Previous ECG:  Compared to current    Similarity:  No change  Interpretation:     Interpretation: normal    Rate:     ECG rate assessment: normal    Rhythm:     Rhythm: sinus rhythm    Ectopy:     Ectopy: none    QRS:     QRS axis:  Normal    QRS intervals:  Normal  Conduction:     Conduction: normal    ST segments:     ST segments:  Normal  T waves:     T waves: normal           Kiki Ferrari DO  04/13/22 3749

## 2022-04-20 ENCOUNTER — TELEMEDICINE (OUTPATIENT)
Dept: FAMILY MEDICINE CLINIC | Facility: CLINIC | Age: 57
End: 2022-04-20
Payer: COMMERCIAL

## 2022-04-20 ENCOUNTER — TELEPHONE (OUTPATIENT)
Dept: FAMILY MEDICINE CLINIC | Facility: CLINIC | Age: 57
End: 2022-04-20

## 2022-04-20 DIAGNOSIS — J06.9 VIRAL UPPER RESPIRATORY TRACT INFECTION: Primary | ICD-10-CM

## 2022-04-20 PROCEDURE — 99442 PR PHYS/QHP TELEPHONE EVALUATION 11-20 MIN: CPT | Performed by: FAMILY MEDICINE

## 2022-04-20 RX ORDER — FLUTICASONE PROPIONATE 50 MCG
SPRAY, SUSPENSION (ML) NASAL
Qty: 9.9 ML | Refills: 0 | Status: SHIPPED | OUTPATIENT
Start: 2022-04-20

## 2022-04-20 NOTE — PROGRESS NOTES
Virtual Brief Visit    Patient is located in the following state in which I hold an active license [place]      Assessment/Plan:  1  Viral upper respiratory tract infection  Suspect viral   Pt has appt with pcp on 4/25/2022  Tylenol prn sore throat          Patient Instructions   Robitussin DM as needed for coughing/chest congestion    flonase nasal spray - rx sent to pharmacy    Recommendation to increase fluids - particularly water, rest, saline nasal spray and humidified air    If symptoms not improving/worsening into next week  Please call  Problem List Items Addressed This Visit     None          Recent Visits  Date Type Provider Dept   04/13/22 Telephone Mark Greenwood PA-C [facility]   Showing recent visits within past 7 days and meeting all other requirements  Today's Visits  Date Type Provider Dept   04/20/22 Telemedicine DO [facility]   04/20/22 Telephone [facility]   Showing today's visits and meeting all other requirements  Future Appointments  No visits were found meeting these conditions  Showing future appointments within next 150 days and meeting all other requirements     Pt presents today for a sick visit - requesting a telephone visit  2 days ago she was outdoors without a coat - and it was cold out  Then she developed nasal congestion  Sinus pressure  And sore throat  Coughing  - productive of yellow  No smoking  Has taken aleve and cough syrup otc  No known sick contacts  Had covid and flu vaccines  No fever  Nasal discharge is clear  Pt with some allergies            I spent 15 minutes directly with the patient during this visit

## 2022-04-20 NOTE — TELEPHONE ENCOUNTER
Patient phoned 4/20/22 stating has an appointment 4/25/22 but would like medication to hold her over until then     Very stuffy nose chest congestion and sore throat    Please give the patient a call on her mobil phone when something is sent to the pharmacy

## 2022-04-25 ENCOUNTER — OFFICE VISIT (OUTPATIENT)
Dept: FAMILY MEDICINE CLINIC | Facility: CLINIC | Age: 57
End: 2022-04-25
Payer: COMMERCIAL

## 2022-04-25 VITALS
TEMPERATURE: 97.8 F | HEIGHT: 62 IN | OXYGEN SATURATION: 98 % | DIASTOLIC BLOOD PRESSURE: 90 MMHG | BODY MASS INDEX: 27.79 KG/M2 | SYSTOLIC BLOOD PRESSURE: 120 MMHG | WEIGHT: 151 LBS | HEART RATE: 82 BPM | RESPIRATION RATE: 16 BRPM

## 2022-04-25 DIAGNOSIS — E04.9 THYROID ENLARGEMENT: ICD-10-CM

## 2022-04-25 DIAGNOSIS — Z13.220 LIPID SCREENING: ICD-10-CM

## 2022-04-25 DIAGNOSIS — Z13.1 DIABETES MELLITUS SCREENING: ICD-10-CM

## 2022-04-25 DIAGNOSIS — D50.0 IRON DEFICIENCY ANEMIA DUE TO CHRONIC BLOOD LOSS: ICD-10-CM

## 2022-04-25 DIAGNOSIS — J01.90 ACUTE SINUSITIS, RECURRENCE NOT SPECIFIED, UNSPECIFIED LOCATION: Primary | ICD-10-CM

## 2022-04-25 PROCEDURE — 1036F TOBACCO NON-USER: CPT | Performed by: PHYSICIAN ASSISTANT

## 2022-04-25 PROCEDURE — 3008F BODY MASS INDEX DOCD: CPT | Performed by: PHYSICIAN ASSISTANT

## 2022-04-25 PROCEDURE — 3725F SCREEN DEPRESSION PERFORMED: CPT | Performed by: PHYSICIAN ASSISTANT

## 2022-04-25 PROCEDURE — 99214 OFFICE O/P EST MOD 30 MIN: CPT | Performed by: PHYSICIAN ASSISTANT

## 2022-04-25 RX ORDER — GUAIFENESIN/DEXTROMETHORPHAN 100-10MG/5
5 SYRUP ORAL 3 TIMES DAILY PRN
COMMUNITY
End: 2022-07-01

## 2022-04-25 RX ORDER — AMOXICILLIN AND CLAVULANATE POTASSIUM 875; 125 MG/1; MG/1
1 TABLET, FILM COATED ORAL EVERY 12 HOURS SCHEDULED
Qty: 14 TABLET | Refills: 0 | Status: SHIPPED | OUTPATIENT
Start: 2022-04-25 | End: 2022-05-02
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
## 2022-04-25 NOTE — ASSESSMENT & PLAN NOTE
Patient questioning whether she should be seeing Hematology or not  Appears that she was going to be discharged in 2020 if her follow-up labs were normal   Recheck with labs prior to next visit and will determine next step at that time

## 2022-04-28 ENCOUNTER — TELEPHONE (OUTPATIENT)
Dept: ADMINISTRATIVE | Facility: OTHER | Age: 57
End: 2022-04-28

## 2022-04-28 NOTE — TELEPHONE ENCOUNTER
Upon review of the In Basket request we were able to locate, review, and update the patient chart as requested for Mammogram     Any additional questions or concerns should be emailed to the Practice Liaisons via [email or web address] email, please do not reply via In Basket      Thank you  Halina Jamison MA

## 2022-04-28 NOTE — TELEPHONE ENCOUNTER
----- Message from [facility] sent at 4/27/2022  2:09 PM EDT -----  Regarding: Mammogram  04/27/22 2:09 PM    Hello, our patient Harshil Loomis Kin has had Mammogram completed/performed  Please assist in updating the patient chart by pulling the Care Everywhere (CE) document  The date of service is 3/24/2022       Thank you,  [facility]  [address]

## 2022-05-18 ENCOUNTER — TELEPHONE (OUTPATIENT)
Dept: FAMILY MEDICINE CLINIC | Facility: CLINIC | Age: 57
End: 2022-05-18

## 2022-05-23 ENCOUNTER — APPOINTMENT (OUTPATIENT)
Dept: LAB | Facility: CLINIC | Age: 57
End: 2022-05-23
Payer: COMMERCIAL

## 2022-05-23 ENCOUNTER — OFFICE VISIT (OUTPATIENT)
Dept: FAMILY MEDICINE CLINIC | Facility: CLINIC | Age: 57
End: 2022-05-23
Payer: COMMERCIAL

## 2022-05-23 VITALS
RESPIRATION RATE: 12 BRPM | SYSTOLIC BLOOD PRESSURE: 110 MMHG | HEIGHT: 62 IN | BODY MASS INDEX: 27.38 KG/M2 | OXYGEN SATURATION: 98 % | WEIGHT: 148.8 LBS | DIASTOLIC BLOOD PRESSURE: 88 MMHG | HEART RATE: 86 BPM

## 2022-05-23 DIAGNOSIS — D50.0 IRON DEFICIENCY ANEMIA DUE TO CHRONIC BLOOD LOSS: ICD-10-CM

## 2022-05-23 DIAGNOSIS — R07.89 ATYPICAL CHEST PAIN: Primary | ICD-10-CM

## 2022-05-23 DIAGNOSIS — Z13.1 DIABETES MELLITUS SCREENING: ICD-10-CM

## 2022-05-23 DIAGNOSIS — E04.9 THYROID ENLARGEMENT: ICD-10-CM

## 2022-05-23 DIAGNOSIS — Z13.220 LIPID SCREENING: ICD-10-CM

## 2022-05-23 DIAGNOSIS — F41.9 ANXIETY: ICD-10-CM

## 2022-05-23 LAB
ALBUMIN SERPL BCP-MCNC: 3.6 G/DL (ref 3.5–5)
ALP SERPL-CCNC: 162 U/L (ref 46–116)
ALT SERPL W P-5'-P-CCNC: 32 U/L (ref 12–78)
ANION GAP SERPL CALCULATED.3IONS-SCNC: 2 MMOL/L (ref 4–13)
AST SERPL W P-5'-P-CCNC: 22 U/L (ref 5–45)
BASOPHILS # BLD AUTO: 0.11 THOUSANDS/ΜL (ref 0–0.1)
BASOPHILS NFR BLD AUTO: 1 % (ref 0–1)
BILIRUB SERPL-MCNC: 0.33 MG/DL (ref 0.2–1)
BUN SERPL-MCNC: 9 MG/DL (ref 5–25)
CALCIUM SERPL-MCNC: 10.7 MG/DL (ref 8.3–10.1)
CHLORIDE SERPL-SCNC: 109 MMOL/L (ref 100–108)
CHOLEST SERPL-MCNC: 233 MG/DL
CO2 SERPL-SCNC: 29 MMOL/L (ref 21–32)
CREAT SERPL-MCNC: 0.77 MG/DL (ref 0.6–1.3)
EOSINOPHIL # BLD AUTO: 0.22 THOUSAND/ΜL (ref 0–0.61)
EOSINOPHIL NFR BLD AUTO: 2 % (ref 0–6)
ERYTHROCYTE [DISTWIDTH] IN BLOOD BY AUTOMATED COUNT: 13.4 % (ref 11.6–15.1)
FERRITIN SERPL-MCNC: 96 NG/ML (ref 8–388)
GFR SERPL CREATININE-BSD FRML MDRD: 86 ML/MIN/1.73SQ M
GLUCOSE P FAST SERPL-MCNC: 96 MG/DL (ref 65–99)
HCT VFR BLD AUTO: 46.8 % (ref 34.8–46.1)
HDLC SERPL-MCNC: 45 MG/DL
HGB BLD-MCNC: 15 G/DL (ref 11.5–15.4)
IMM GRANULOCYTES # BLD AUTO: 0.03 THOUSAND/UL (ref 0–0.2)
IMM GRANULOCYTES NFR BLD AUTO: 0 % (ref 0–2)
IRON SERPL-MCNC: 83 UG/DL (ref 50–170)
LDLC SERPL CALC-MCNC: 147 MG/DL (ref 0–100)
LYMPHOCYTES # BLD AUTO: 4.03 THOUSANDS/ΜL (ref 0.6–4.47)
LYMPHOCYTES NFR BLD AUTO: 34 % (ref 14–44)
MCH RBC QN AUTO: 28.6 PG (ref 26.8–34.3)
MCHC RBC AUTO-ENTMCNC: 32.1 G/DL (ref 31.4–37.4)
MCV RBC AUTO: 89 FL (ref 82–98)
MONOCYTES # BLD AUTO: 0.58 THOUSAND/ΜL (ref 0.17–1.22)
MONOCYTES NFR BLD AUTO: 5 % (ref 4–12)
NEUTROPHILS # BLD AUTO: 7.05 THOUSANDS/ΜL (ref 1.85–7.62)
NEUTS SEG NFR BLD AUTO: 58 % (ref 43–75)
NRBC BLD AUTO-RTO: 0 /100 WBCS
PLATELET # BLD AUTO: 315 THOUSANDS/UL (ref 149–390)
PMV BLD AUTO: 10.9 FL (ref 8.9–12.7)
POTASSIUM SERPL-SCNC: 3.9 MMOL/L (ref 3.5–5.3)
PROT SERPL-MCNC: 8.4 G/DL (ref 6.4–8.2)
RBC # BLD AUTO: 5.25 MILLION/UL (ref 3.81–5.12)
SODIUM SERPL-SCNC: 140 MMOL/L (ref 136–145)
TIBC SERPL-MCNC: 328 UG/DL (ref 250–450)
TRIGL SERPL-MCNC: 205 MG/DL
TSH SERPL DL<=0.05 MIU/L-ACNC: 0.72 UIU/ML (ref 0.45–4.5)
WBC # BLD AUTO: 12.02 THOUSAND/UL (ref 4.31–10.16)

## 2022-05-23 PROCEDURE — 85025 COMPLETE CBC W/AUTO DIFF WBC: CPT

## 2022-05-23 PROCEDURE — 83550 IRON BINDING TEST: CPT

## 2022-05-23 PROCEDURE — 99214 OFFICE O/P EST MOD 30 MIN: CPT | Performed by: INTERNAL MEDICINE

## 2022-05-23 PROCEDURE — 84443 ASSAY THYROID STIM HORMONE: CPT

## 2022-05-23 PROCEDURE — 36415 COLL VENOUS BLD VENIPUNCTURE: CPT

## 2022-05-23 PROCEDURE — 1036F TOBACCO NON-USER: CPT | Performed by: INTERNAL MEDICINE

## 2022-05-23 PROCEDURE — 3725F SCREEN DEPRESSION PERFORMED: CPT | Performed by: INTERNAL MEDICINE

## 2022-05-23 PROCEDURE — 83540 ASSAY OF IRON: CPT

## 2022-05-23 PROCEDURE — 80053 COMPREHEN METABOLIC PANEL: CPT

## 2022-05-23 PROCEDURE — 82728 ASSAY OF FERRITIN: CPT

## 2022-05-23 PROCEDURE — 3008F BODY MASS INDEX DOCD: CPT | Performed by: INTERNAL MEDICINE

## 2022-05-23 PROCEDURE — 80061 LIPID PANEL: CPT

## 2022-05-23 RX ORDER — ESCITALOPRAM OXALATE 10 MG/1
10 TABLET ORAL DAILY
Qty: 90 TABLET | Refills: 0 | Status: SHIPPED | OUTPATIENT
Start: 2022-05-23 | End: 2022-07-01 | Stop reason: SDUPTHER

## 2022-05-23 NOTE — ASSESSMENT & PLAN NOTE
Patient reports short episodes of chest pain associated with strong emotions  No exertional symptoms  She said that usually it is when she is very anxious  She was recently emergency room with negative EKG and troponins  She does not have any major cardiac risk factors except of hyperlipidemia  Will do treadmill stress test   In the meantime she was instructed to let me know immediately or go to emergency room if she will develop any substernal chest pain again

## 2022-05-23 NOTE — ASSESSMENT & PLAN NOTE
She reports frequent episodes of anxiety  Will start her on escitalopram 10 mg daily  Side effects discussed  She will let me know in her symptoms in few weeks

## 2022-05-23 NOTE — PROGRESS NOTES
Assessment/Plan:    Atypical chest pain  Patient reports short episodes of chest pain associated with strong emotions  No exertional symptoms  She said that usually it is when she is very anxious  She was recently emergency room with negative EKG and troponins  She does not have any major cardiac risk factors except of hyperlipidemia  Will do treadmill stress test   In the meantime she was instructed to let me know immediately or go to emergency room if she will develop any substernal chest pain again  Anxiety  She reports frequent episodes of anxiety  Will start her on escitalopram 10 mg daily  Side effects discussed  She will let me know in her symptoms in few weeks  Diagnoses and all orders for this visit:    Atypical chest pain  -     Stress test only, exercise; Future    Anxiety  -     escitalopram (Lexapro) 10 mg tablet; Take 1 tablet (10 mg total) by mouth in the morning  Subjective:      Patient ID: Elsie Dooley is a 65 y o  female  Patient came today for follow-up on her a typical chest pain and anxiety  The following portions of the patient's history were reviewed and updated as appropriate: allergies, current medications, past family history, past medical history, past social history, past surgical history, and problem list     Review of Systems   Respiratory: Negative for shortness of breath and wheezing  Cardiovascular: Negative for chest pain, palpitations and leg swelling  Psychiatric/Behavioral: Negative for agitation, behavioral problems, confusion, self-injury and suicidal ideas  The patient is nervous/anxious            Objective:      /88 (BP Location: Left arm, Patient Position: Sitting, Cuff Size: Standard)   Pulse 86   Resp 12   Ht 5' 2" (1 575 m)   Wt 67 5 kg (148 lb 12 8 oz)   LMP 02/02/2020 (Approximate)   SpO2 98%   BMI 27 22 kg/m²     No Known Allergies       Current Outpatient Medications:     dextromethorphan-guaiFENesin (ROBITUSSIN DM)  mg/5 mL syrup, Take 5 mL by mouth 3 (three) times a day as needed for cough, Disp: , Rfl:     escitalopram (Lexapro) 10 mg tablet, Take 1 tablet (10 mg total) by mouth in the morning , Disp: 90 tablet, Rfl: 0    fluticasone (FLONASE) 50 mcg/act nasal spray, 1 spray each nostril bid prn, Disp: 9 9 mL, Rfl: 0     There are no Patient Instructions on file for this visit  Physical Exam  Constitutional:       General: She is not in acute distress  Appearance: Normal appearance  She is not ill-appearing  Cardiovascular:      Rate and Rhythm: Normal rate and regular rhythm  Heart sounds: No murmur heard  No friction rub  No gallop  Pulmonary:      Effort: No respiratory distress  Breath sounds: No wheezing or rhonchi  Chest:      Chest wall: No tenderness  Abdominal:      General: There is no distension  Palpations: There is no mass  Tenderness: There is no abdominal tenderness  There is no guarding or rebound  Hernia: No hernia is present  Musculoskeletal:         General: No swelling or tenderness  Lymphadenopathy:      Cervical: No cervical adenopathy  Skin:     Coloration: Skin is not jaundiced  Findings: No rash  Neurological:      Mental Status: She is alert and oriented to person, place, and time  Motor: No weakness        Gait: Gait normal    Psychiatric:         Mood and Affect: Mood normal          Behavior: Behavior normal

## 2022-05-25 ENCOUNTER — TELEPHONE (OUTPATIENT)
Dept: FAMILY MEDICINE CLINIC | Facility: CLINIC | Age: 57
End: 2022-05-25

## 2022-05-25 NOTE — TELEPHONE ENCOUNTER
Called pt around 6:28 PM to call back for her lab results    ----- Message from Fernanda Paulino sent at 5/25/2022  5:30 PM EDT -----  Please let the patient know that her lipid panel is elevated  Her calculated ASCVD risk (10 yr estimated heart attack or stroke) risk though is low at 2 3% so medication is not currently needed  The labs do show an increase in her calcium level  Please see if she is taking any calcium containing supplements  If so, I recommend stopping it  Please ask her to repeat the test prior to her July appointment with me   (CMP ordered )

## 2022-06-23 ENCOUNTER — HOSPITAL ENCOUNTER (OUTPATIENT)
Dept: NON INVASIVE DIAGNOSTICS | Facility: HOSPITAL | Age: 57
Discharge: HOME | End: 2022-06-23
Attending: INTERNAL MEDICINE
Payer: COMMERCIAL

## 2022-06-23 VITALS — HEIGHT: 62 IN | BODY MASS INDEX: 27.23 KG/M2 | WEIGHT: 148 LBS

## 2022-06-23 DIAGNOSIS — R07.89 ATYPICAL CHEST PAIN: ICD-10-CM

## 2022-06-23 LAB
BASELINE ST DEPRESSION: 0 MM
MAX HR PERCENT: 98 %
MAX HR: 162 BPM
RATE PRESSURE PRODUCT: NORMAL
SL CV STRESS RECOVERY BP: NORMAL MMHG
SL CV STRESS RECOVERY HR: 114 BPM
SL CV STRESS RECOVERY O2 SAT: 98 %
SL CV STRESS STAGE REACHED: 4
STRESS ANGINA INDEX: 0
STRESS BASELINE BP: NORMAL MMHG
STRESS BASELINE HR: 103 BPM
STRESS DUKE TREADMILL SCORE: 9
STRESS O2 SAT REST: 97 %
STRESS PEAK HR: 162 BPM
STRESS POST ESTIMATED WORKLOAD: 10.4 METS
STRESS POST EXERCISE DUR MIN: 9 MIN
STRESS POST EXERCISE DUR SEC: 11 SEC
STRESS POST O2 SAT PEAK: 94 %
STRESS POST PEAK BP: 172 MMHG
STRESS ST DEPRESSION: 0 MM

## 2022-06-23 PROCEDURE — 93018 CV STRESS TEST I&R ONLY: CPT

## 2022-06-23 PROCEDURE — 93016 CV STRESS TEST SUPVJ ONLY: CPT

## 2022-06-23 PROCEDURE — 93017 CV STRESS TEST TRACING ONLY: CPT

## 2022-06-24 ENCOUNTER — RA CDI HCC (OUTPATIENT)
Dept: OTHER | Facility: HOSPITAL | Age: 57
End: 2022-06-24

## 2022-06-24 LAB
CHEST PAIN STATEMENT: NORMAL
MAX DIASTOLIC BP: 80 MMHG
MAX HEART RATE: 162 BPM
MAX PREDICTED HEART RATE: 164 BPM
MAX. SYSTOLIC BP: 172 MMHG
PROTOCOL NAME: NORMAL
REASON FOR TERMINATION: NORMAL
TARGET HR FORMULA: NORMAL
TEST INDICATION: NORMAL
TIME IN EXERCISE PHASE: NORMAL

## 2022-06-24 NOTE — PROGRESS NOTES
[facility]  coding opportunities       Chart reviewed, no opportunity found: CHART REVIEWED, NO OPPORTUNITY FOUND        Patients Insurance        Commercial Insurance: [employer]

## 2022-07-01 ENCOUNTER — OFFICE VISIT (OUTPATIENT)
Dept: FAMILY MEDICINE CLINIC | Facility: CLINIC | Age: 57
End: 2022-07-01
Payer: COMMERCIAL

## 2022-07-01 VITALS
HEART RATE: 105 BPM | OXYGEN SATURATION: 96 % | SYSTOLIC BLOOD PRESSURE: 128 MMHG | BODY MASS INDEX: 28.26 KG/M2 | WEIGHT: 153.6 LBS | TEMPERATURE: 97.1 F | DIASTOLIC BLOOD PRESSURE: 86 MMHG | HEIGHT: 62 IN

## 2022-07-01 DIAGNOSIS — Z00.00 ANNUAL PHYSICAL EXAM: Primary | ICD-10-CM

## 2022-07-01 DIAGNOSIS — E66.3 OVERWEIGHT (BMI 25.0-29.9): ICD-10-CM

## 2022-07-01 DIAGNOSIS — J30.9 ALLERGIC RHINITIS, UNSPECIFIED SEASONALITY, UNSPECIFIED TRIGGER: ICD-10-CM

## 2022-07-01 DIAGNOSIS — F41.9 ANXIETY: ICD-10-CM

## 2022-07-01 PROCEDURE — 99396 PREV VISIT EST AGE 40-64: CPT | Performed by: PHYSICIAN ASSISTANT

## 2022-07-01 RX ORDER — CETIRIZINE HYDROCHLORIDE 10 MG/1
10 TABLET ORAL DAILY
Qty: 90 TABLET | Refills: 3 | Status: SHIPPED | OUTPATIENT
Start: 2022-07-01

## 2022-07-01 RX ORDER — ESCITALOPRAM OXALATE 10 MG/1
10 TABLET ORAL DAILY
Qty: 90 TABLET | Refills: 1 | Status: SHIPPED | OUTPATIENT
Start: 2022-07-01 | End: 2022-09-28 | Stop reason: SDUPTHER

## 2022-07-01 NOTE — PATIENT INSTRUCTIONS
Please confirm coverage of the Shingrix (shingles vaccine)  Please schedule a nurse visit for this once coverage is confirmed  Wellness Visit for Adults   AMBULATORY CARE:   A wellness visit  is when you see your healthcare provider to get screened for health problems  Your healthcare provider will also give you advice on how to stay healthy  Write down your questions so you remember to ask them  Ask your healthcare provider how often you should have a wellness visit  What happens at a wellness visit:  Your healthcare provider will ask about your health, and your family history of health problems  This includes high blood pressure, heart disease, and cancer  He or she will ask if you have symptoms that concern you, if you smoke, and about your mood  You may also be asked about your intake of medicines, supplements, food, and alcohol  Any of the following may be done:  · Your weight  will be checked  Your height may also be checked so your body mass index (BMI) can be calculated  Your BMI shows if you are at a healthy weight  · Your blood pressure  and heart rate will be checked  Your temperature may also be checked  · Blood and urine tests  may be done  Blood tests may be done to check your cholesterol levels  Abnormal cholesterol levels increase your risk for heart disease and stroke  You may also need a blood or urine test to check for diabetes if you are at increased risk  Urine tests may be done to look for signs of an infection or kidney disease  · A physical exam  includes checking your heartbeat and lungs with a stethoscope  Your healthcare provider may also check your skin to look for sun damage  · Screening tests  may be recommended  A screening test is done to check for diseases that may not cause symptoms  The screening tests you may need depend on your age, gender, family history, and lifestyle habits   For example, colorectal screening may be recommended if you are 48years old or older     Screening tests you need if you are a woman:   · A Pap smear  is used to screen for cervical cancer  Pap smears are usually done every 3 to 5 years depending on your age  You may need them more often if you have had abnormal Pap smear test results in the past  Ask your healthcare provider how often you should have a Pap smear  · A mammogram  is an x-ray of your breasts to screen for breast cancer  Experts recommend mammograms every 2 years starting at age 48 years  You may need a mammogram at age 52 years or younger if you have an increased risk for breast cancer  Talk to your healthcare provider about when you should start having mammograms and how often you need them  Vaccines you may need:   · Get an influenza vaccine  every year  The influenza vaccine protects you from the flu  Several types of viruses cause the flu  The viruses change over time, so new vaccines are made each year  · Get a tetanus-diphtheria (Td) booster vaccine  every 10 years  This vaccine protects you against tetanus and diphtheria  Tetanus is a severe infection that may cause painful muscle spasms and lockjaw  Diphtheria is a severe bacterial infection that causes a thick covering in the back of your mouth and throat  · Get a human papillomavirus (HPV) vaccine  if you are female and aged 23 to 32 or male 23 to 24 and never received it  This vaccine protects you from HPV infection  HPV is the most common infection spread by sexual contact  HPV may also cause vaginal, penile, and anal cancers  · Get a pneumococcal vaccine  if you are aged 72 years or older  The pneumococcal vaccine is an injection given to protect you from pneumococcal disease  Pneumococcal disease is an infection caused by pneumococcal bacteria  The infection may cause pneumonia, meningitis, or an ear infection  · Get a shingles vaccine  if you are 60 or older, even if you have had shingles before   The shingles vaccine is an injection to protect you from the varicella-zoster virus  This is the same virus that causes chickenpox  Shingles is a painful rash that develops in people who had chickenpox or have been exposed to the virus  How to eat healthy:  My Plate is a model for planning healthy meals  It shows the types and amounts of foods that should go on your plate  Fruits and vegetables make up about half of your plate, and grains and protein make up the other half  A serving of dairy is included on the side of your plate  The amount of calories and serving sizes you need depends on your age, gender, weight, and height  Examples of healthy foods are listed below:  · Eat a variety of vegetables  such as dark green, red, and orange vegetables  You can also include canned vegetables low in sodium (salt) and frozen vegetables without added butter or sauces  · Eat a variety of fresh fruits , canned fruit in 100% juice, frozen fruit, and dried fruit  · Include whole grains  At least half of the grains you eat should be whole grains  Examples include whole-wheat bread, wheat pasta, brown rice, and whole-grain cereals such as oatmeal     · Eat a variety of protein foods such as seafood (fish and shellfish), lean meat, and poultry without skin (turkey and chicken)  Examples of lean meats include pork leg, shoulder, or tenderloin, and beef round, sirloin, tenderloin, and extra lean ground beef  Other protein foods include eggs and egg substitutes, beans, peas, soy products, nuts, and seeds  · Choose low-fat dairy products such as skim or 1% milk or low-fat yogurt, cheese, and cottage cheese  · Limit unhealthy fats  such as butter, hard margarine, and shortening  Exercise:  Exercise at least 30 minutes per day on most days of the week  Some examples of exercise include walking, biking, dancing, and swimming  You can also fit in more physical activity by taking the stairs instead of the elevator or parking farther away from stores   Include muscle strengthening activities 2 days each week  Regular exercise provides many health benefits  It helps you manage your weight, and decreases your risk for type 2 diabetes, heart disease, stroke, and high blood pressure  Exercise can also help improve your mood  Ask your healthcare provider about the best exercise plan for you  General health and safety guidelines:   · Do not smoke  Nicotine and other chemicals in cigarettes and cigars can cause lung damage  Ask your healthcare provider for information if you currently smoke and need help to quit  E-cigarettes or smokeless tobacco still contain nicotine  Talk to your healthcare provider before you use these products  · Limit alcohol  A drink of alcohol is 12 ounces of beer, 5 ounces of wine, or 1½ ounces of liquor  · Lose weight, if needed  Being overweight increases your risk of certain health conditions  These include heart disease, high blood pressure, type 2 diabetes, and certain types of cancer  · Protect your skin  Do not sunbathe or use tanning beds  Use sunscreen with a SPF 15 or higher  Apply sunscreen at least 15 minutes before you go outside  Reapply sunscreen every 2 hours  Wear protective clothing, hats, and sunglasses when you are outside  · Drive safely  Always wear your seatbelt  Make sure everyone in your car wears a seatbelt  A seatbelt can save your life if you are in an accident  Do not use your cell phone when you are driving  This could distract you and cause an accident  Pull over if you need to make a call or send a text message  · Practice safe sex  Use latex condoms if are sexually active and have more than one partner  Your healthcare provider may recommend screening tests for sexually transmitted infections (STIs)  · Wear helmets, lifejackets, and protective gear  Always wear a helmet when you ride a bike or motorcycle, go skiing, or play sports that could cause a head injury   Wear protective equipment when you play sports  Wear a lifejacket when you are on a boat or doing water sports  © Copyright [employer] 2022 Information is for End User's use only and may not be sold, redistributed or otherwise used for commercial purposes  All illustrations and images included in CareNotes® are the copyrighted property of [employer]  or Derek Terrell   The above information is an  only  It is not intended as medical advice for individual conditions or treatments  Talk to your doctor, nurse or pharmacist before following any medical regimen to see if it is safe and effective for you

## 2022-07-01 NOTE — PROGRESS NOTES
Course  ADULT ANNUAL PHYSICAL  [facility] [facility]    NAME: Harshil Sanderson  AGE: 65 y o  SEX: female  : 1965     DATE: 7/3/2022     Assessment and Plan:     Problem List Items Addressed This Visit        Respiratory    Allergic rhinitis     Given refill on Zyrtec 10 mg daily  Continue Flonase  Call if symptoms worsen or persist            Relevant Medications    cetirizine (ZyrTEC) 10 mg tablet       Other    Overweight (BMI 25 0-29  9)     BMI Counseling: Body mass index is 28 09 kg/m²  The BMI is above normal  Nutrition recommendations include decreasing overall calorie intake and 3-5 servings of fruits/vegetables daily  Exercise recommendations include moderate aerobic physical activity for 150 minutes/week and exercising 3-5 times per week  Anxiety     Improved  Continue Lexapro 10 mg daily  Refill provided today  Call with any concerns prior to next visit  Relevant Medications    escitalopram (Lexapro) 10 mg tablet      Other Visit Diagnoses     Annual physical exam    -  Primary          Immunizations and preventive care screenings were discussed with patient today  Appropriate education was printed on patient's after visit summary  Counseling:  Dental Health: discussed importance of regular dental visits  · Exercise: the importance of regular exercise/physical activity was discussed  Recommend exercise 3-5 times per week for at least 30 minutes  Return in about 6 months (around 2023) for Recheck  Chief Complaint:     Chief Complaint   Patient presents with    Physical Exam      History of Present Illness:     Adult Annual Physical   Patient here for a comprehensive physical exam  The patient reports problems - as below  The patient reports that recently anxiety has been improved    Daily medication includes Lexapro 10 mg daily - notes that she has been without it for about 10 days because [place]  She also requests a refill on her allergy medication  She has sneezing  Diet and Physical Activity  · Diet/Nutrition: well balanced diet and consuming 3-5 servings of fruits/vegetables daily  · Exercise: walking  Depression Screening  PHQ-2/9 Depression Screening         General Health  · Sleep: gets 7-8 hours of sleep on average  · Hearing: normal - bilateral   · Vision: recently saw the eye doctor in [place]  · Dental: no dental visits for >1 year  /GYN Health  · Patient is: postmenopausal  · Last menstrual period: age 49       Review of Systems:     Review of Systems   Constitutional: Negative for chills and fever  HENT: Negative for rhinorrhea and sore throat  Eyes: Negative for visual disturbance  Respiratory: Negative for cough, shortness of breath and wheezing  Cardiovascular: Negative for chest pain, palpitations and leg swelling  Gastrointestinal: Negative for abdominal pain, constipation, diarrhea, nausea and vomiting  Endocrine: Negative for polydipsia and polyuria  Genitourinary: Negative for dysuria and frequency  Musculoskeletal: Negative for arthralgias and myalgias  Skin: Negative for rash  Neurological: Positive for light-headedness (occasional)  Negative for dizziness, syncope and headaches  Hematological: Does not bruise/bleed easily  Psychiatric/Behavioral: Negative for dysphoric mood  The patient is nervous/anxious  Past Medical History:     Past Medical History:   Diagnosis Date    Allergic     Anemia     Disease of thyroid gland     enlarged thyroid    Intraductal hyperplasia without atypia of right breast 02/2016    c florid and sclerosing adenosis, background dense stromal fibrosis    Iron deficiency anemia due to chronic blood loss     transfused 2015 9/17: ferritin 2   H/H 8 8/31 7%    Menorrhagia with regular cycle     Varicella     without complication      Past Surgical History:     Past Surgical History:   Procedure Laterality Date    BREAST BIOPSY Right 02/2016    intraductal hyperplasia without atypia,fibroid and sclerosing adenosis, and columnar cell change in a bsckround of dense stromal fibrosis no malignancy identified    TUBAL LIGATION      US GUIDED BREAST BIOPSY RIGHT COMPLETE Right 2/19/2016    UTERINE FIBROID SURGERY        Social History:     Social History     Socioeconomic History    Marital status:      Spouse name: None    Number of children: None    Years of education: None    Highest education level: None   Occupational History    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   Tobacco Use    Smoking status: Never Smoker    Smokeless tobacco: Never Used   Vaping Use    Vaping Use: Never used   Substance and Sexual Activity    Alcohol use: Not Currently    Drug use: No    Sexual activity: Yes     Partners: Male   Other Topics Concern    None   Social History Narrative    ** Merged History Encounter **    Inadequate exercise    Adventism              Social Determinants of Health     Financial Resource Strain: Not on file   Food Insecurity: Not on file   Transportation Needs: Not on file   Physical Activity: Not on file   Stress: Not on file   Social Connections: Not on file   Intimate Partner Violence: Not on file   Housing Stability: Not on file      Family History:     Family History   Problem Relation Age of Onset    Breast cancer Mother         52's passed 78 yo c mets    Anemia Mother     Thyroid disease Mother         total thyroidectomy uncertain reason    Cancer Mother     Hyperlipidemia Father     No Known Problems Brother     ADD / ADHD Son     No Known Problems Daughter     No Known Problems Maternal Grandmother     No Known Problems Maternal Grandfather     No Known Problems Paternal Grandmother     No Known Problems Paternal Grandfather     No Known Problems Daughter     No Known Problems Paternal Aunt     No Known Problems Paternal Aunt     No Known Problems Paternal Aunt     No Known Problems Paternal Aunt     No Known Problems Paternal Aunt     Deep vein thrombosis Neg Hx     Mental illness Neg Hx     Hypertension Neg Hx     Coronary artery disease Neg Hx     Diabetes type II Neg Hx       Current Medications:     Current Outpatient Medications   Medication Sig Dispense Refill    cetirizine (ZyrTEC) 10 mg tablet Take 1 tablet (10 mg total) by mouth daily 90 tablet 3    escitalopram (Lexapro) 10 mg tablet Take 1 tablet (10 mg total) by mouth daily 90 tablet 1    fluticasone (FLONASE) 50 mcg/act nasal spray 1 spray each nostril bid prn 9 9 mL 0     No current facility-administered medications for this visit  Allergies:     No Known Allergies   Physical Exam:     /86 (BP Location: Left arm, Patient Position: Sitting, Cuff Size: Adult)   Pulse 105   Temp (!) 97 1 °F (36 2 °C)   Ht 5' 2" (1 575 m)   Wt 69 7 kg (153 lb 9 6 oz)   LMP 02/02/2020 (Approximate)   SpO2 96%   BMI 28 09 kg/m²     Physical Exam  Vitals reviewed  Constitutional:       General: She is not in acute distress  Appearance: Normal appearance  She is well-developed  She is not ill-appearing  HENT:      Head: Normocephalic and atraumatic  Right Ear: External ear normal       Left Ear: External ear normal       Nose: Congestion (pale and swollen turbinates bilaterally) present  Mouth/Throat:      Mouth: Mucous membranes are moist       Pharynx: No oropharyngeal exudate  Eyes:      Conjunctiva/sclera: Conjunctivae normal       Pupils: Pupils are equal, round, and reactive to light  Neck:      Thyroid: No thyromegaly  Cardiovascular:      Rate and Rhythm: Normal rate and regular rhythm  Pulses: Normal pulses  Heart sounds: Normal heart sounds  No murmur heard  Pulmonary:      Effort: Pulmonary effort is normal       Breath sounds: Normal breath sounds  No wheezing, rhonchi or rales     Abdominal: General: Bowel sounds are normal  There is no distension  Palpations: Abdomen is soft  There is no mass  Tenderness: There is no abdominal tenderness  Musculoskeletal:      Cervical back: Normal range of motion and neck supple  Right lower leg: No edema  Left lower leg: No edema  Lymphadenopathy:      Cervical: No cervical adenopathy  Skin:     General: Skin is warm and dry  Findings: No rash  Neurological:      Mental Status: She is alert  Sensory: No sensory deficit  Comments: 5/5 strength in UE and LE   Psychiatric:         Mood and Affect: Mood normal          Behavior: Behavior normal          Thought Content:  Thought content normal          Judgment: Judgment normal           Isi Thomas PA-C  [facility] no

## 2022-07-03 NOTE — ASSESSMENT & PLAN NOTE
BMI Counseling: Body mass index is 28 09 kg/m²  The BMI is above normal  Nutrition recommendations include decreasing overall calorie intake and 3-5 servings of fruits/vegetables daily  Exercise recommendations include moderate aerobic physical activity for 150 minutes/week and exercising 3-5 times per week

## 2022-07-03 NOTE — ASSESSMENT & PLAN NOTE
Improved  Continue Lexapro 10 mg daily  Refill provided today  Call with any concerns prior to next visit

## 2022-07-05 ENCOUNTER — TELEPHONE (OUTPATIENT)
Dept: ADMINISTRATIVE | Facility: OTHER | Age: 57
End: 2022-07-05

## 2022-07-05 NOTE — TELEPHONE ENCOUNTER
Upon review of the In Basket request we were able to locate, review, and update the patient chart as requested for Pap Smear (HPV) aka Cervical Cancer Screening  Any additional questions or concerns should be emailed to the Practice Liaisons via [email or web address] email, please do not reply via In Basket      Thank you  Quan Castro

## 2022-07-05 NOTE — TELEPHONE ENCOUNTER
----- Message from Marcellus Irene sent at 7/1/2022  3:32 PM EDT -----  Regarding: Alcides Chambers     Pt states she had a Pap done at [facility] on 3/16/21  Could you find this for me please? Thank you!

## 2022-07-07 ENCOUNTER — TELEPHONE (OUTPATIENT)
Dept: FAMILY MEDICINE CLINIC | Facility: CLINIC | Age: 57
End: 2022-07-07

## 2022-07-07 NOTE — TELEPHONE ENCOUNTER
Pt called and LVM @ 2:20 PM stating she has not yet received her prescriptions  Call returned around 4:05 PM-Harshil stated she had called her pharmacy, and they stated they do not have her prescriptions  I confirmed her pharmacy was [facility], and told her on our end it's showing that they have received it  She is requesting someone to call the pharmacy to see what is going on with her medications

## 2022-07-12 NOTE — TELEPHONE ENCOUNTER
Call placed to patient re: escitalopram (Zollie Peek)  Last dose: 6/19/22  Completely out of med  90 day / R-1 filled on 5/23/22 per Pharmacist     Pharmacist reports insurance is refusing fill as Rx refill would not be due until August  Pharmacist could ask insurance to fill d/t lost medication  Patient reports she has been taking one tablet everyday until she ran out on 6/19  Please advise

## 2022-07-13 ENCOUNTER — TELEPHONE (OUTPATIENT)
Dept: FAMILY MEDICINE CLINIC | Facility: HOSPITAL | Age: 57
End: 2022-07-13

## 2022-07-13 NOTE — TELEPHONE ENCOUNTER
I called the pharmacy they stated its the insurance that will not cover the charge until August  If she gets a 30 day supply for now she will have to pay  $34 25  I spoke with patient  She is ok paying that    She will  the medication on Friday when she gets paid

## 2022-07-20 ENCOUNTER — OFFICE VISIT (OUTPATIENT)
Dept: FAMILY MEDICINE CLINIC | Facility: CLINIC | Age: 57
End: 2022-07-20
Payer: COMMERCIAL

## 2022-07-20 VITALS — TEMPERATURE: 102.2 F | OXYGEN SATURATION: 98 % | HEART RATE: 115 BPM

## 2022-07-20 DIAGNOSIS — R52 BODY ACHES: Primary | ICD-10-CM

## 2022-07-20 DIAGNOSIS — J02.9 SORE THROAT: ICD-10-CM

## 2022-07-20 DIAGNOSIS — U07.1 COVID-19: ICD-10-CM

## 2022-07-20 LAB
SARS-COV-2 AG UPPER RESP QL IA: POSITIVE
VALID CONTROL: ABNORMAL

## 2022-07-20 PROCEDURE — 99214 OFFICE O/P EST MOD 30 MIN: CPT | Performed by: INTERNAL MEDICINE

## 2022-07-20 PROCEDURE — 87811 SARS-COV-2 COVID19 W/OPTIC: CPT | Performed by: INTERNAL MEDICINE

## 2022-07-20 NOTE — LETTER
[address] [facility] PRIMARY CARE  [facility] [address]  [phone]  Dept: [phone]    July 20, 2022    Patient: Susie Sanderson  YOB: 1965    Susie Sanderson was seen and evaluated at our [facility]  Covid test is positive, she may return to work on 07/25/2022, as this is 5 days from the onset of symptoms  Upon return, they must then adhere to strict masking for an additional 5 days if Harshil is fever free for 24 hours without the use of fever reducing agent       Sincerely,    Jamila Navarro,

## 2022-07-20 NOTE — PROGRESS NOTES
COVID-19 Outpatient Progress Note    Assessment/Plan:    Problem List Items Addressed This Visit    None     Visit Diagnoses     Body aches    -  Primary    Relevant Orders    POCT Rapid Covid Ag (Completed)    Sore throat        Relevant Orders    POCT Rapid Covid Ag (Completed)    COVID-19        Relevant Medications    nirmatrelvir & ritonavir (Paxlovid) tablet therapy pack         Disposition:     Patient came to the office today was tested positive for COVID-19 her saturation is okay, she runs fevers  She agreed for treatment with Paxlovid  Side effects discussed  She will stay on isolation as recommended by CDC, she will continue with 5 more days of masking after that  She will let me know if no improvement of her symptoms in few days  She will also call me if any worsening of her symptoms or any new shortness of breath will arise  I have spent 15 minutes directly with the patient  Encounter provider Elisha Jason MD    Provider located at [place] [id number] [phone]    Recent Visits  No visits were found meeting these conditions  Showing recent visits within past 7 days and meeting all other requirements  Today's Visits  Date Type Provider Dept   07/20/22 Office Visit Elisha Jason MD [place] [address] [facility]   Showing today's visits and meeting all other requirements  Future Appointments  No visits were found meeting these conditions  Showing future appointments within next 150 days and meeting all other requirements     This virtual check-in was done via telephone and she agrees to proceed  Patient agrees to participate in a virtual check in via telephone or video visit instead of presenting to the office to address urgent/immediate medical needs  Patient is aware this is a billable service      After connecting through Telephone, the patient was identified by name and date of birth  Harshil Loomis Dimashabbir was informed that this was a telemedicine visit and that the exam was being conducted confidentially over secure lines  My office door was closed  Harshil Sanderson acknowledged consent and understanding of privacy and security of the telemedicine visit  I informed the patient that I have reviewed her record in Epic and presented the opportunity for her to ask any questions regarding the visit today  The patient agreed to participate  It was my intent to perform this visit via video technology but the patient was not able to do a video connection so the visit was completed via audio telephone only  Verification of patient location:  Patient is located in the following state in which I hold an active license: [place]    Subjective:   Julita Mustafa is a 65 y o  female who is concerned about COVID-19      - Date of symptom onset: 7/19/2022      COVID-19 vaccination status: Fully vaccinated with booster    Lab Results   Component Value Date    SARSCOV2 Negative 11/24/2021    SARSCOV2 Negative 06/22/2021    SARSCOVAG Positive (A) 07/20/2022     Past Medical History:   Diagnosis Date    Allergic     Anemia     Disease of thyroid gland     enlarged thyroid    Intraductal hyperplasia without atypia of right breast 02/2016    c florid and sclerosing adenosis, background dense stromal fibrosis    Iron deficiency anemia due to chronic blood loss     transfused 2015 9/17: ferritin 2   H/H 8 8/31 7%    Menorrhagia with regular cycle     Varicella     without complication     Past Surgical History:   Procedure Laterality Date    BREAST BIOPSY Right 02/2016    intraductal hyperplasia without atypia,fibroid and sclerosing adenosis, and columnar cell change in a bsckround of dense stromal fibrosis no malignancy identified    TUBAL LIGATION      US GUIDED BREAST BIOPSY RIGHT COMPLETE Right 2/19/2016    UTERINE FIBROID SURGERY       Current Outpatient Medications   Medication Sig Dispense Refill    nirmatrelvir & ritonavir (Paxlovid) tablet therapy pack Take 3 tablets by mouth 2 (two) times a day for 5 days 30 tablet 0    cetirizine (ZyrTEC) 10 mg tablet Take 1 tablet (10 mg total) by mouth daily 90 tablet 3    escitalopram (Lexapro) 10 mg tablet Take 1 tablet (10 mg total) by mouth daily 90 tablet 1    fluticasone (FLONASE) 50 mcg/act nasal spray 1 spray each nostril bid prn 9 9 mL 0     No current facility-administered medications for this visit  No Known Allergies    Review of Systems  Objective:    Vitals:    07/20/22 1440   Pulse: (!) 115   Temp: (!) 102 2 °F (39 °C)   SpO2: 98%       Physical Exam    VIRTUAL VISIT DISCLAIMER    Harshil Sanderson verbally agrees to participate in [employer]  Pt is aware that [employer] could be limited without vital signs or the ability to perform a full hands-on physical exam  Harshil Sanderson understands she or the provider may request at any time to terminate the video visit and request the patient to seek care or treatment in person

## 2022-07-20 NOTE — PROGRESS NOTES
Assessment/Plan:    No problem-specific Assessment & Plan notes found for this encounter  Diagnoses and all orders for this visit:    Body aches  -     POCT Rapid Covid Ag    Sore throat  -     POCT Rapid Covid Ag    COVID-19  -     nirmatrelvir & ritonavir (Paxlovid) tablet therapy pack; Take 3 tablets by mouth 2 (two) times a day for 5 days          Subjective:      Patient ID: Mayte Sommers is a 65 y o  female  HPI    The following portions of the patient's history were reviewed and updated as appropriate: allergies, current medications, past family history, past medical history, past social history, past surgical history, and problem list     Review of Systems      Objective:      Pulse (!) 115   Temp (!) 102 2 °F (39 °C)   LMP 02/02/2020 (Approximate)   SpO2 98%     No Known Allergies       Current Outpatient Medications:     nirmatrelvir & ritonavir (Paxlovid) tablet therapy pack, Take 3 tablets by mouth 2 (two) times a day for 5 days, Disp: 30 tablet, Rfl: 0    cetirizine (ZyrTEC) 10 mg tablet, Take 1 tablet (10 mg total) by mouth daily, Disp: 90 tablet, Rfl: 3    escitalopram (Lexapro) 10 mg tablet, Take 1 tablet (10 mg total) by mouth daily, Disp: 90 tablet, Rfl: 1    fluticasone (FLONASE) 50 mcg/act nasal spray, 1 spray each nostril bid prn, Disp: 9 9 mL, Rfl: 0     There are no Patient Instructions on file for this visit          Physical Exam

## 2022-09-26 ENCOUNTER — TELEPHONE (OUTPATIENT)
Dept: FAMILY MEDICINE CLINIC | Facility: CLINIC | Age: 57
End: 2022-09-26

## 2022-09-26 NOTE — TELEPHONE ENCOUNTER
Pt has done her mammogram on Thursday, at [facility] and she waiting for her results

## 2022-09-27 NOTE — TELEPHONE ENCOUNTER
Yes  Please let the patient know that her ultrasound was normal  She should plan on a screening mammogram of both breasts in 1 year

## 2022-09-28 DIAGNOSIS — F41.9 ANXIETY: ICD-10-CM

## 2022-09-28 RX ORDER — ESCITALOPRAM OXALATE 10 MG/1
10 TABLET ORAL DAILY
Qty: 90 TABLET | Refills: 1 | Status: SHIPPED | OUTPATIENT
Start: 2022-09-28

## 2022-09-28 NOTE — TELEPHONE ENCOUNTER
Spoke with patient (in [address] [facility]) , informed about US results are normal, to do screening mammo in 1 year  Patient is going to travel 10/8/22  and she need new Rx for Lexapro to be send to [facility]  Separate Rx request send in separate message

## 2022-10-10 NOTE — PATIENT INSTRUCTIONS
Robitussin DM as needed for coughing/chest congestion    flonase nasal spray - rx sent to pharmacy    Recommendation to increase fluids - particularly water, rest, saline nasal spray and humidified air    If symptoms not improving/worsening into next week  Please call  [facility]

## 2022-11-08 ENCOUNTER — OFFICE VISIT (OUTPATIENT)
Dept: FAMILY MEDICINE CLINIC | Facility: CLINIC | Age: 57
End: 2022-11-08

## 2022-11-08 VITALS
TEMPERATURE: 96.3 F | SYSTOLIC BLOOD PRESSURE: 110 MMHG | BODY MASS INDEX: 27.6 KG/M2 | OXYGEN SATURATION: 98 % | DIASTOLIC BLOOD PRESSURE: 68 MMHG | WEIGHT: 150 LBS | HEIGHT: 62 IN | HEART RATE: 86 BPM

## 2022-11-08 DIAGNOSIS — M25.561 ACUTE PAIN OF RIGHT KNEE: Primary | ICD-10-CM

## 2022-11-08 RX ORDER — SENNOSIDES 8.6 MG
650 CAPSULE ORAL EVERY 8 HOURS PRN
Qty: 90 TABLET | Refills: 0 | Status: SHIPPED | OUTPATIENT
Start: 2022-11-08

## 2022-11-08 RX ORDER — CELECOXIB 200 MG/1
200 CAPSULE ORAL 2 TIMES DAILY
Qty: 14 CAPSULE | Refills: 0 | Status: SHIPPED | OUTPATIENT
Start: 2022-11-08 | End: 2022-11-15

## 2022-11-08 NOTE — PATIENT INSTRUCTIONS
Use Celebrex for 7 days 1 pill twice a day  You can use it together with Tylenol arthritis 1 pill 3 times a day  Tylenol Arthritis you can continue to take even after you done with Celebrex

## 2022-11-08 NOTE — PROGRESS NOTES
Assessment/Plan:    No problem-specific Assessment & Plan notes found for this encounter  Diagnoses and all orders for this visit:    Acute pain of right knee  -     celecoxib (CeleBREX) 200 mg capsule; Take 1 capsule (200 mg total) by mouth 2 (two) times a day for 7 days  -     acetaminophen (TYLENOL) 650 mg CR tablet; Take 1 tablet (650 mg total) by mouth every 8 (eight) hours as needed for mild pain      reports acute pain in her right knee after the fall, she has superficial excoriation which is healing  Normal range of motion  No significant tenderness on palpation  Will hold off on any imaging as for now, continue with Celebrex for 7 days and Tylenol arthritis  She will let me know if no improvement in a week  Subjective:      Patient ID: Jarod Moffett is a 65 y o  female  Patient came today with complaints of right knee pain after she fell on it  The following portions of the patient's history were reviewed and updated as appropriate: allergies, current medications, past family history, past medical history, past social history, past surgical history, and problem list     Review of Systems   Musculoskeletal: Positive for arthralgias  Negative for gait problem  Skin: Positive for wound  Negative for color change           Objective:      /68 (BP Location: Left arm, Patient Position: Sitting, Cuff Size: Large)   Pulse 86   Temp (!) 96 3 °F (35 7 °C) (Tympanic)   Ht 5' 2" (1 575 m)   Wt 68 kg (150 lb)   LMP 02/02/2020 (Approximate)   SpO2 98%   BMI 27 44 kg/m²     No Known Allergies       Current Outpatient Medications:   •  acetaminophen (TYLENOL) 650 mg CR tablet, Take 1 tablet (650 mg total) by mouth every 8 (eight) hours as needed for mild pain, Disp: 90 tablet, Rfl: 0  •  celecoxib (CeleBREX) 200 mg capsule, Take 1 capsule (200 mg total) by mouth 2 (two) times a day for 7 days, Disp: 14 capsule, Rfl: 0  •  cetirizine (ZyrTEC) 10 mg tablet, Take 1 tablet (10 mg total) by mouth daily, Disp: 90 tablet, Rfl: 3  •  escitalopram (Lexapro) 10 mg tablet, Take 1 tablet (10 mg total) by mouth daily, Disp: 90 tablet, Rfl: 1  •  fluticasone (FLONASE) 50 mcg/act nasal spray, 1 spray each nostril bid prn, Disp: 9 9 mL, Rfl: 0     Patient Instructions   Use Celebrex for 7 days 1 pill twice a day  You can use it together with Tylenol arthritis 1 pill 3 times a day  Tylenol Arthritis you can continue to take even after you done with Celebrex  Physical Exam  Constitutional:       General: She is not in acute distress  Appearance: She is not ill-appearing or toxic-appearing  Cardiovascular:      Rate and Rhythm: Normal rate  Musculoskeletal:         General: Tenderness present  No swelling or deformity  Neurological:      General: No focal deficit present        Gait: Gait normal

## 2023-03-31 ENCOUNTER — TELEPHONE (OUTPATIENT)
Dept: ADMINISTRATIVE | Facility: OTHER | Age: 58
End: 2023-03-31

## 2023-03-31 NOTE — TELEPHONE ENCOUNTER
Upon review of the In Basket request we were able to locate, review, and update the patient chart as requested for Mammogram     Any additional questions or concerns should be emailed to the Practice Liaisons via the appropriate education email address, please do not reply via In Basket      Thank you  Ajit Coates

## 2023-03-31 NOTE — TELEPHONE ENCOUNTER
----- Message from [employer] sent at 3/31/2023 10:10 AM EDT -----  Regarding: care gap request  03/31/23 10:10 AM    Hello, our patient attached above has had Mammogram completed/performed  Please assist in updating the patient chart by pulling the Care Everywhere (CE) document  The date of service is 3/23/23       Thank you,  [employer]  [facility] [address]

## 2023-03-31 NOTE — TELEPHONE ENCOUNTER
Upon review of the In Basket request we were able to locate, review, and update the patient chart as requested for Mammogram     Any additional questions or concerns should be emailed to the Practice Liaisons via the appropriate education email address, please do not reply via In Basket      Thank you  Pamela Delgadillo

## 2023-03-31 NOTE — TELEPHONE ENCOUNTER
----- Message from Felipe Jackman sent at 3/24/2023  8:21 AM EDT -----  Regarding: care gap request  03/24/23 8:21 AM    Hello, our patient attached above has had Mammogram completed/performed  Please assist in updating the patient chart by pulling the Care Everywhere (CE) document  The date of service is 3/23/22       Thank you,  Felipe Jackman  [facility] [address]

## 2023-10-15 NOTE — TELEPHONE ENCOUNTER
Disregard  Patient had to schedule with Dr Atilio Rutherford on Monday 
Patient has been scheduled Monday 1030 A 
Pt was just released from ED today (05/18)    She had chest pains , test was done and doctors at the ED told her it's Anxiety  She would to talk about medication options      [address] [phone]     Please advise
Trish Ríos you  We can discuss possible anxiety medication options at her appointment on Monday 
English